# Patient Record
Sex: FEMALE | Race: WHITE | Employment: OTHER | URBAN - METROPOLITAN AREA
[De-identification: names, ages, dates, MRNs, and addresses within clinical notes are randomized per-mention and may not be internally consistent; named-entity substitution may affect disease eponyms.]

---

## 2025-01-07 ENCOUNTER — HOSPITAL ENCOUNTER (INPATIENT)
Age: 70
LOS: 10 days | Discharge: NURSING, REHAB OR LONG-TERM CARE | DRG: 082 | End: 2025-01-17
Attending: EMERGENCY MEDICINE | Admitting: PSYCHIATRY & NEUROLOGY
Payer: MEDICARE

## 2025-01-07 ENCOUNTER — APPOINTMENT (OUTPATIENT)
Dept: GENERAL RADIOLOGY | Age: 70
DRG: 082 | End: 2025-01-07
Payer: MEDICARE

## 2025-01-07 ENCOUNTER — APPOINTMENT (OUTPATIENT)
Dept: MRI IMAGING | Age: 70
DRG: 082 | End: 2025-01-07
Payer: MEDICARE

## 2025-01-07 ENCOUNTER — HOSPITAL ENCOUNTER (EMERGENCY)
Age: 70
Discharge: ANOTHER HOSPITAL | DRG: 082 | End: 2025-01-07
Attending: EMERGENCY MEDICINE
Payer: MEDICARE

## 2025-01-07 ENCOUNTER — APPOINTMENT (OUTPATIENT)
Dept: CT IMAGING | Age: 70
DRG: 082 | End: 2025-01-07
Payer: MEDICARE

## 2025-01-07 VITALS
TEMPERATURE: 98 F | BODY MASS INDEX: 23.54 KG/M2 | HEART RATE: 69 BPM | DIASTOLIC BLOOD PRESSURE: 90 MMHG | OXYGEN SATURATION: 91 % | HEIGHT: 67 IN | SYSTOLIC BLOOD PRESSURE: 133 MMHG | WEIGHT: 150 LBS | RESPIRATION RATE: 16 BRPM

## 2025-01-07 DIAGNOSIS — R79.89 ELEVATED TSH: ICD-10-CM

## 2025-01-07 DIAGNOSIS — S06.33AA: Primary | ICD-10-CM

## 2025-01-07 DIAGNOSIS — R29.6 FREQUENT FALLS: ICD-10-CM

## 2025-01-07 DIAGNOSIS — I63.9 CEREBROVASCULAR ACCIDENT (CVA), UNSPECIFIED MECHANISM (HCC): ICD-10-CM

## 2025-01-07 DIAGNOSIS — M17.0 PRIMARY OSTEOARTHRITIS OF BOTH KNEES: ICD-10-CM

## 2025-01-07 DIAGNOSIS — R79.89 ELEVATED TROPONIN: ICD-10-CM

## 2025-01-07 DIAGNOSIS — S06.33AA FOCAL HEMORRHAGIC CONTUSION OF CEREBRUM: Primary | ICD-10-CM

## 2025-01-07 PROBLEM — I62.9 ACUTE INTRACRANIAL HEMORRHAGE (HCC): Status: ACTIVE | Noted: 2025-01-07

## 2025-01-07 LAB
25(OH)D3 SERPL-MCNC: 32.1 NG/ML (ref 30–100)
ALBUMIN SERPL-MCNC: 4.5 G/DL (ref 3.5–5.2)
ALBUMIN SERPL-MCNC: 4.7 G/DL (ref 3.5–5.2)
ALBUMIN/GLOB SERPL: 1.2 {RATIO} (ref 1–2.5)
ALP SERPL-CCNC: 97 U/L (ref 35–104)
ALT SERPL-CCNC: 17 U/L (ref 5–33)
AMMONIA PLAS-SCNC: 14 UMOL/L (ref 11–51)
AMPHET UR QL SCN: NEGATIVE
ANION GAP SERPL CALCULATED.3IONS-SCNC: 14 MMOL/L (ref 9–17)
APAP SERPL-MCNC: <5 UG/ML (ref 10–30)
AST SERPL-CCNC: 25 U/L
BACTERIA URNS QL MICRO: ABNORMAL
BARBITURATES UR QL SCN: NEGATIVE
BASOPHILS # BLD: 0.1 K/UL (ref 0–0.2)
BASOPHILS NFR BLD: 0 % (ref 0–2)
BENZODIAZ UR QL: NEGATIVE
BILIRUB SERPL-MCNC: 0.4 MG/DL (ref 0.3–1.2)
BILIRUB UR QL STRIP: NEGATIVE
BUN SERPL-MCNC: 16 MG/DL (ref 8–23)
CALCIUM SERPL-MCNC: 9.6 MG/DL (ref 8.6–10.4)
CANNABINOIDS UR QL SCN: NEGATIVE
CHARACTER UR: ABNORMAL
CHLORIDE SERPL-SCNC: 101 MMOL/L (ref 98–107)
CLARITY UR: CLEAR
CO2 SERPL-SCNC: 27 MMOL/L (ref 20–31)
COCAINE UR QL SCN: NEGATIVE
COLOR UR: YELLOW
CREAT SERPL-MCNC: 0.8 MG/DL (ref 0.5–0.9)
EOSINOPHIL # BLD: 0 K/UL (ref 0–0.4)
EOSINOPHILS RELATIVE PERCENT: 0 % (ref 1–4)
EPI CELLS #/AREA URNS HPF: ABNORMAL /HPF (ref 0–5)
ERYTHROCYTE [DISTWIDTH] IN BLOOD BY AUTOMATED COUNT: 16.5 % (ref 12.5–15.4)
EST. AVERAGE GLUCOSE BLD GHB EST-MCNC: 126 MG/DL
ETHANOL PERCENT: <0.01 %
ETHANOLAMINE SERPL-MCNC: <10 MG/DL (ref 0–0.08)
FENTANYL UR QL: NEGATIVE
GFR, ESTIMATED: 80 ML/MIN/1.73M2
GLUCOSE SERPL-MCNC: 166 MG/DL (ref 70–99)
GLUCOSE UR STRIP-MCNC: NEGATIVE MG/DL
HBA1C MFR BLD: 6 % (ref 4–6)
HCT VFR BLD AUTO: 43.6 % (ref 36–46)
HGB BLD-MCNC: 14.3 G/DL (ref 12–16)
HGB UR QL STRIP.AUTO: NEGATIVE
INR PPP: 1
KETONES UR STRIP-MCNC: NEGATIVE MG/DL
LEUKOCYTE ESTERASE UR QL STRIP: NEGATIVE
LYMPHOCYTES NFR BLD: 1.3 K/UL (ref 1–4.8)
LYMPHOCYTES RELATIVE PERCENT: 9 % (ref 24–44)
MCH RBC QN AUTO: 30.3 PG (ref 26–34)
MCHC RBC AUTO-ENTMCNC: 32.9 G/DL (ref 31–37)
MCV RBC AUTO: 92 FL (ref 80–100)
METHADONE UR QL: NEGATIVE
MONOCYTES NFR BLD: 0.9 K/UL (ref 0.1–1.2)
MONOCYTES NFR BLD: 6 % (ref 2–11)
NEUTROPHILS NFR BLD: 85 % (ref 36–66)
NEUTS SEG NFR BLD: 12 K/UL (ref 1.8–7.7)
NITRITE UR QL STRIP: NEGATIVE
OPIATES UR QL SCN: NEGATIVE
OXYCODONE UR QL SCN: NEGATIVE
PARTIAL THROMBOPLASTIN TIME: 23.2 SEC (ref 21.3–31.3)
PCP UR QL SCN: NEGATIVE
PH UR STRIP: 6 [PH] (ref 5–8)
PLATELET # BLD AUTO: 356 K/UL (ref 140–450)
PMV BLD AUTO: 7.4 FL (ref 6–12)
POTASSIUM SERPL-SCNC: 4.2 MMOL/L (ref 3.7–5.3)
PROT SERPL-MCNC: 8.6 G/DL (ref 6.4–8.3)
PROT UR STRIP-MCNC: ABNORMAL MG/DL
PROTHROMBIN TIME: 10.3 SEC (ref 9.4–12.6)
RBC # BLD AUTO: 4.74 M/UL (ref 4–5.2)
RBC #/AREA URNS HPF: ABNORMAL /HPF (ref 0–2)
SALICYLATES SERPL-MCNC: <1 MG/DL (ref 3–10)
SODIUM SERPL-SCNC: 142 MMOL/L (ref 135–144)
SP GR UR STRIP: 1.02 (ref 1–1.03)
T4 FREE SERPL-MCNC: 0.1 NG/DL (ref 0.92–1.68)
T4 FREE SERPL-MCNC: <0.1 NG/DL (ref 0.9–1.7)
TEST INFORMATION: NORMAL
TROPONIN I SERPL HS-MCNC: 45 NG/L (ref 0–14)
TROPONIN I SERPL HS-MCNC: 55 NG/L (ref 0–14)
TROPONIN I SERPL HS-MCNC: 60 NG/L (ref 0–14)
TSH SERPL DL<=0.05 MIU/L-ACNC: 37 UIU/ML (ref 0.27–4.2)
TSH SERPL DL<=0.05 MIU/L-ACNC: 45.82 UIU/ML (ref 0.3–5)
UROBILINOGEN UR STRIP-ACNC: NORMAL EU/DL (ref 0–1)
VIT B12 SERPL-MCNC: 1044 PG/ML (ref 232–1245)
WBC #/AREA URNS HPF: ABNORMAL /HPF (ref 0–5)
WBC OTHER # BLD: 14.2 K/UL (ref 3.5–11)

## 2025-01-07 PROCEDURE — 93005 ELECTROCARDIOGRAM TRACING: CPT | Performed by: EMERGENCY MEDICINE

## 2025-01-07 PROCEDURE — 6360000002 HC RX W HCPCS: Performed by: EMERGENCY MEDICINE

## 2025-01-07 PROCEDURE — 84439 ASSAY OF FREE THYROXINE: CPT

## 2025-01-07 PROCEDURE — 90715 TDAP VACCINE 7 YRS/> IM: CPT | Performed by: EMERGENCY MEDICINE

## 2025-01-07 PROCEDURE — 81001 URINALYSIS AUTO W/SCOPE: CPT

## 2025-01-07 PROCEDURE — 70450 CT HEAD/BRAIN W/O DYE: CPT

## 2025-01-07 PROCEDURE — 70553 MRI BRAIN STEM W/O & W/DYE: CPT

## 2025-01-07 PROCEDURE — 87086 URINE CULTURE/COLONY COUNT: CPT

## 2025-01-07 PROCEDURE — 90471 IMMUNIZATION ADMIN: CPT | Performed by: EMERGENCY MEDICINE

## 2025-01-07 PROCEDURE — 72125 CT NECK SPINE W/O DYE: CPT

## 2025-01-07 PROCEDURE — 73562 X-RAY EXAM OF KNEE 3: CPT

## 2025-01-07 PROCEDURE — G0480 DRUG TEST DEF 1-7 CLASSES: HCPCS

## 2025-01-07 PROCEDURE — 83036 HEMOGLOBIN GLYCOSYLATED A1C: CPT

## 2025-01-07 PROCEDURE — 80179 DRUG ASSAY SALICYLATE: CPT

## 2025-01-07 PROCEDURE — 85610 PROTHROMBIN TIME: CPT

## 2025-01-07 PROCEDURE — 6360000002 HC RX W HCPCS

## 2025-01-07 PROCEDURE — 82140 ASSAY OF AMMONIA: CPT

## 2025-01-07 PROCEDURE — 82040 ASSAY OF SERUM ALBUMIN: CPT

## 2025-01-07 PROCEDURE — 99222 1ST HOSP IP/OBS MODERATE 55: CPT | Performed by: SURGERY

## 2025-01-07 PROCEDURE — 99285 EMERGENCY DEPT VISIT HI MDM: CPT

## 2025-01-07 PROCEDURE — 6360000004 HC RX CONTRAST MEDICATION: Performed by: STUDENT IN AN ORGANIZED HEALTH CARE EDUCATION/TRAINING PROGRAM

## 2025-01-07 PROCEDURE — 84443 ASSAY THYROID STIM HORMONE: CPT

## 2025-01-07 PROCEDURE — A9579 GAD-BASE MR CONTRAST NOS,1ML: HCPCS | Performed by: STUDENT IN AN ORGANIZED HEALTH CARE EDUCATION/TRAINING PROGRAM

## 2025-01-07 PROCEDURE — 6370000000 HC RX 637 (ALT 250 FOR IP)

## 2025-01-07 PROCEDURE — 84484 ASSAY OF TROPONIN QUANT: CPT

## 2025-01-07 PROCEDURE — 85730 THROMBOPLASTIN TIME PARTIAL: CPT

## 2025-01-07 PROCEDURE — 80053 COMPREHEN METABOLIC PANEL: CPT

## 2025-01-07 PROCEDURE — 82607 VITAMIN B-12: CPT

## 2025-01-07 PROCEDURE — 80307 DRUG TEST PRSMV CHEM ANLYZR: CPT

## 2025-01-07 PROCEDURE — 2000000000 HC ICU R&B

## 2025-01-07 PROCEDURE — 72170 X-RAY EXAM OF PELVIS: CPT

## 2025-01-07 PROCEDURE — 96365 THER/PROPH/DIAG IV INF INIT: CPT

## 2025-01-07 PROCEDURE — 96366 THER/PROPH/DIAG IV INF ADDON: CPT

## 2025-01-07 PROCEDURE — 85025 COMPLETE CBC W/AUTO DIFF WBC: CPT

## 2025-01-07 PROCEDURE — 82306 VITAMIN D 25 HYDROXY: CPT

## 2025-01-07 PROCEDURE — 80143 DRUG ASSAY ACETAMINOPHEN: CPT

## 2025-01-07 PROCEDURE — 71045 X-RAY EXAM CHEST 1 VIEW: CPT

## 2025-01-07 RX ORDER — ONDANSETRON 4 MG/1
4 TABLET, ORALLY DISINTEGRATING ORAL EVERY 8 HOURS PRN
Status: DISCONTINUED | OUTPATIENT
Start: 2025-01-07 | End: 2025-01-13 | Stop reason: SDUPTHER

## 2025-01-07 RX ORDER — ACETAMINOPHEN 325 MG/1
650 TABLET ORAL EVERY 6 HOURS PRN
Status: DISCONTINUED | OUTPATIENT
Start: 2025-01-07 | End: 2025-01-16

## 2025-01-07 RX ORDER — LEVOTHYROXINE SODIUM 50 UG/1
25 TABLET ORAL DAILY
Status: DISCONTINUED | OUTPATIENT
Start: 2025-01-07 | End: 2025-01-08

## 2025-01-07 RX ORDER — WATER 10 ML/10ML
INJECTION INTRAMUSCULAR; INTRAVENOUS; SUBCUTANEOUS
Status: DISPENSED
Start: 2025-01-07 | End: 2025-01-08

## 2025-01-07 RX ORDER — ACETAMINOPHEN 500 MG
1000 TABLET ORAL ONCE
Status: COMPLETED | OUTPATIENT
Start: 2025-01-07 | End: 2025-01-07

## 2025-01-07 RX ORDER — ACETAMINOPHEN 650 MG/1
650 SUPPOSITORY RECTAL EVERY 6 HOURS PRN
Status: DISCONTINUED | OUTPATIENT
Start: 2025-01-07 | End: 2025-01-16

## 2025-01-07 RX ORDER — LABETALOL HYDROCHLORIDE 5 MG/ML
10 INJECTION, SOLUTION INTRAVENOUS ONCE
Status: COMPLETED | OUTPATIENT
Start: 2025-01-07 | End: 2025-01-07

## 2025-01-07 RX ORDER — SODIUM CHLORIDE 0.9 % (FLUSH) 0.9 %
5-40 SYRINGE (ML) INJECTION PRN
Status: DISCONTINUED | OUTPATIENT
Start: 2025-01-07 | End: 2025-01-17 | Stop reason: HOSPADM

## 2025-01-07 RX ORDER — ONDANSETRON 2 MG/ML
4 INJECTION INTRAMUSCULAR; INTRAVENOUS EVERY 6 HOURS PRN
Status: DISCONTINUED | OUTPATIENT
Start: 2025-01-07 | End: 2025-01-13 | Stop reason: SDUPTHER

## 2025-01-07 RX ORDER — HYDRALAZINE HYDROCHLORIDE 20 MG/ML
10 INJECTION INTRAMUSCULAR; INTRAVENOUS
Status: DISCONTINUED | OUTPATIENT
Start: 2025-01-07 | End: 2025-01-12

## 2025-01-07 RX ORDER — SODIUM CHLORIDE 9 MG/ML
INJECTION, SOLUTION INTRAVENOUS PRN
Status: DISCONTINUED | OUTPATIENT
Start: 2025-01-07 | End: 2025-01-17 | Stop reason: HOSPADM

## 2025-01-07 RX ORDER — SODIUM CHLORIDE 0.9 % (FLUSH) 0.9 %
5-40 SYRINGE (ML) INJECTION EVERY 12 HOURS SCHEDULED
Status: DISCONTINUED | OUTPATIENT
Start: 2025-01-07 | End: 2025-01-17 | Stop reason: HOSPADM

## 2025-01-07 RX ORDER — NICARDIPINE HYDROCHLORIDE 0.1 MG/ML
2.5-15 INJECTION INTRAVENOUS CONTINUOUS
Status: DISCONTINUED | OUTPATIENT
Start: 2025-01-07 | End: 2025-01-08

## 2025-01-07 RX ORDER — LABETALOL HYDROCHLORIDE 5 MG/ML
10 INJECTION, SOLUTION INTRAVENOUS
Status: DISCONTINUED | OUTPATIENT
Start: 2025-01-07 | End: 2025-01-12

## 2025-01-07 RX ADMIN — NICARDIPINE HYDROCHLORIDE 2.5 MG/HR: 0.1 INJECTION INTRAVENOUS at 17:18

## 2025-01-07 RX ADMIN — ACETAMINOPHEN 1000 MG: 500 TABLET ORAL at 11:02

## 2025-01-07 RX ADMIN — LABETALOL HYDROCHLORIDE 10 MG: 5 INJECTION, SOLUTION INTRAVENOUS at 06:53

## 2025-01-07 RX ADMIN — NICARDIPINE HYDROCHLORIDE 2.5 MG/HR: 0.1 INJECTION INTRAVENOUS at 10:19

## 2025-01-07 RX ADMIN — LEVOTHYROXINE SODIUM 25 MCG: 50 TABLET ORAL at 18:47

## 2025-01-07 RX ADMIN — TETANUS TOXOID, REDUCED DIPHTHERIA TOXOID AND ACELLULAR PERTUSSIS VACCINE, ADSORBED 0.5 ML: 5; 2.5; 8; 8; 2.5 SUSPENSION INTRAMUSCULAR at 06:15

## 2025-01-07 RX ADMIN — GADOTERIDOL 13 ML: 279.3 INJECTION, SOLUTION INTRAVENOUS at 13:17

## 2025-01-07 ASSESSMENT — LIFESTYLE VARIABLES
HOW OFTEN DO YOU HAVE A DRINK CONTAINING ALCOHOL: NEVER
HOW MANY STANDARD DRINKS CONTAINING ALCOHOL DO YOU HAVE ON A TYPICAL DAY: PATIENT DOES NOT DRINK

## 2025-01-07 ASSESSMENT — PAIN DESCRIPTION - LOCATION
LOCATION: KNEE

## 2025-01-07 ASSESSMENT — PAIN - FUNCTIONAL ASSESSMENT
PAIN_FUNCTIONAL_ASSESSMENT: PREVENTS OR INTERFERES SOME ACTIVE ACTIVITIES AND ADLS
PAIN_FUNCTIONAL_ASSESSMENT: NONE - DENIES PAIN
PAIN_FUNCTIONAL_ASSESSMENT: 0-10
PAIN_FUNCTIONAL_ASSESSMENT: PREVENTS OR INTERFERES SOME ACTIVE ACTIVITIES AND ADLS

## 2025-01-07 ASSESSMENT — PAIN SCALES - GENERAL
PAINLEVEL_OUTOF10: 7
PAINLEVEL_OUTOF10: 0
PAINLEVEL_OUTOF10: 0
PAINLEVEL_OUTOF10: 9

## 2025-01-07 ASSESSMENT — PAIN DESCRIPTION - DESCRIPTORS
DESCRIPTORS: SORE
DESCRIPTORS: SHARP;SQUEEZING

## 2025-01-07 ASSESSMENT — PAIN DESCRIPTION - ORIENTATION
ORIENTATION: LEFT;RIGHT
ORIENTATION: RIGHT;LEFT
ORIENTATION: RIGHT;LEFT

## 2025-01-07 ASSESSMENT — PAIN DESCRIPTION - FREQUENCY: FREQUENCY: CONTINUOUS

## 2025-01-07 ASSESSMENT — PAIN DESCRIPTION - PAIN TYPE
TYPE: ACUTE PAIN
TYPE: ACUTE PAIN

## 2025-01-07 NOTE — PLAN OF CARE
--  NO ACUTE NEUROSURGICAL INTERVENTION AT THIS TIME    NEUROSURGERY TO SIGN OFF     Please contact Neurosurgery with any questions.    PATIENT TO FOLLOW UP IN CLINIC:  ---  Follow-up with Neurosurgery  [address] [facility] [address]  [place] [address]  [phone]    Electronically signed by MARLENE Hull CNP on 1/7/2025 at 5:53 PM

## 2025-01-07 NOTE — ED PROVIDER NOTES
[facility] Emergency Department  [address]  [facility] [address]  Phone: [phone]  Fax: [phone]      Pt Name: Lanny Jaimes  MRN:[id number]  Birthdate 1955  Date of evaluation: 1/7/2025      CHIEF COMPLAINT       Chief Complaint   Patient presents with    Fall     Multiple falls over past couple months    Altered Mental Status       HISTORY OF PRESENT ILLNESS   Lanny Jaimes is a 69 y.o. female who presents for evaluation of altered mental status and frequent falls.  The patient is a poor historian and lives alone.  Her neighbor checks on her frequently and states that she has known falls in September and November.  The patient has not followed with a doctor in several years and was previously treated for hypothyroidism.  The patient does not take any medications on a regular basis.  She admits to falling earlier tonight onto her knees.  She denies striking her head or loss of consciousness.  She complains of some abrasions to her knees and is unsure of the date of her last tetanus shot.  She currently denies any pain.  EMS was called because the patient had difficulty getting up.  They did not feel comfortable leaving her alone since she seemed somewhat altered.  The patient was noted to be confabulating but eventually answers questions appropriately.  She denies fever, chills, headache, vision changes, neck pain, back pain, chest pain, shortness of breath, abdominal pain, urinary/bowel symptoms, focal weakness, numbness, tingling, dizziness, lightheadedness, syncope, recent illness    REVIEW OF SYSTEMS     Positive: Frequent falls, altered mental status  Ten point review of systems was reviewed and is negative unless otherwise noted in the HPI    PAST MEDICAL HISTORY    has a past medical history of Thyroid disease.    SURGICAL HISTORY      has no past surgical history on file.  Patient denies    CURRENT MEDICATIONS       Previous Medications    No medications on file       ALLERGIES    myxedema. Troponin elevated at 60 and 55 but I do not have any baseline for comparison. EKG shows sinus rhythm without any ST changes.  CT head shows a possible small hemorrhagic contusion at the lateral aspect of the left parietal lobe.  There are chronic microvascular ischemic changes.  X-ray of the bilateral knees shows tricompartmental osteoarthritis of the knees.  No acute fracture or dislocation.  She has abrasions over the knees that do not appear infected.  No foreign body.  Her tetanus shot was updated.  Chest x-ray shows no acute process.  I contacted neurosurgery, Dr. Ascencio, at 5:32 AM regarding the patient's CT findings.  He recommends transfer to [place] [facility].  I placed a order for transfer and was initially connected with Dr. Saldana from neurocritical care at 6:08 AM.  He states that he is happy to admit the patient but there is concern that the patient should be evaluated by trauma first.  I subsequently spoke with the ER attending at [facility], Dr. Yang, at 6:16 AM who agrees to accept the patient as an ER to ER transfer with plan to have trauma evaluate. I spoke to the trauma surgery attending, Dr. Russell, at 6:37am and he would like me to add on a CT cervical spine. This will be completed prior to transfer. The patient will be transferred via ground unit from ER to ER.    Differential diagnosis considered: Ingestion, infectious, trauma, seizure, AMS, electrolytes, encephalopathy, insulin, opiates, uremia, toxins, tumor, thyrotoxicosis, psychiatric, sepsis, stroke, N/V, seizure, headache, elderly age, alcohol / drugs / toxidromes, signs of trauma    Chronic Conditions affecting care (DM,HTN,CA, etc): hypothyroidism    Social Determinants of Health affecting care (unable to care for self, lives alone, unemployed, homeless,etc): lives alone    History source(s) (patient,spouse,parent,family,friend,EMS,etc): neighbor    Review of external sources (ECF,Hospital records,EMS report, radiology reports,  etc): hospital records, radiology reports, EMS    Tests considered but not ordered: Not applicable    Independent interpretation of tests (eg.  X-ray, CAT scan, Doppler studies, EKG): EKG    Discussion of x-ray results with radiology: yes, CT head shows possible small left parietal contusion    Consults: Neurosurgery, Trauma surgery, neuro critical care, ER attending, radiology    Consideration for admission/observation (even if discharged): Transfer to [facility] considerations: n/a    Sepsis considered: low suspicion    Critical Care note written: yes        FINAL IMPRESSION      1. Focal contusion of parietal lobe    2. Elevated TSH    3. Primary osteoarthritis of both knees    4. Frequent falls    5. Elevated troponin          DISPOSITION/PLAN   DISPOSITION Decision To Transfer 01/07/2025 05:37:07 AM              (Please note that portions of this note were completed with a voice recognitionprogram.  Efforts were made to edit the dictations but occasionally words are mis-transcribed.)    Traci Aaron DO, FACEP  Emergency Physician Attending                 Traci Aaron DO  01/07/25 0641       Traci Aaron DO  01/07/25 0706

## 2025-01-07 NOTE — ED PROVIDER NOTES
[facility]     Emergency Department     Faculty Note/ Attestation      9:31 AM EST  Pt Name: Lanny Jaimes                                       MRN: [id number]  Birthdate 1955  Date of evaluation: 1/7/2025  Patients PCP:    No primary care provider on file.    Attestation  I performed a history and physical examination of the patient/ or directly observed  and discussed management with the resident. I reviewed the resident’s note and agree with the documented findings and plan of care. Any areas of disagreement are noted on the chart. I was personally present for the key portions of any procedures. I have documented in the chart those procedures where I was not present during the key portions. I have reviewed the emergency nurses triage note. I agree with the chief complaint, past medical history, past surgical history, allergies, medications, social and family history as documented unless otherwise noted below.    For Physician Assistant/ Nurse Practitioner cases/documentation I have personally evaluated this patient and have completed at least one if not all key elements of the E/M (history, physical exam, and MDM). Additional findings are as noted.       Initial Screens:     -------------------------------------     ----------------------------------------  [place] Coma Scale  Eye Opening: Spontaneous  Best Verbal Response: Oriented  Best Motor Response: Obeys commands  Iva Coma Scale Score: 15  ------------------------------------------------------------------------------------------------------------  Vitals:    Vitals:    01/07/25 0924   Pulse: 78   Resp: 17   Temp: 99.5 °F (37.5 °C)   TempSrc: Oral   SpO2: 97%   Weight: 68 kg (149 lb 14.6 oz)   Height: 1.702 m (5' 7\")       Chief Complaint      Chief Complaint   Patient presents with    Fall    Altered Mental Status          height is 1.702 m (5' 7\") and weight is 68 kg (149 lb 14.6 oz). Her oral temperature is 99.5 °F (37.5

## 2025-01-07 NOTE — ED NOTES
ED to inpatient nurses report      Chief Complaint:  Chief Complaint   Patient presents with    Fall    Altered Mental Status     Present to ED from: from home to [facility] then transferred to [facility]     MOA:     LOC: alert and orientated to name, place, date  Mobility: Requires assistance * 1 walker at home   Oxygen Baseline: room aor     Current needs required: frequent falls    Pending ED orders: none   Present condition: fair     Why did the patient come to the ED? as a transfer from [place].  Patient reports sustaining a fall at approximately 1 AM.  She was evaluated at [facility] where CT of the head demonstrated a left hemorrhagic contusion of the parietal lobe.  Patient denies any chemical anticoagulation history.  Denies any prior history of stroke.  She is resting comfortably in bed.  She denies any acute pain at this time. Denies any dysthesia or numbness to limbs. Able to actively move all extremities.      What is the plan? Admit his is a 69 y.o. female with a left parietal lobe hemorrhagic contusion                - No neurosurgical interventions planned for now  - HOB: 30 degrees              - Neuro checks per protocol  - Hold all antiplatelets and anticoagulants  - We recommend SBP < 140              - Determine the lower limit of SBP   Any procedures or intervention occur? Cardene gtt titration . MRI--completed  , labs,   Any safety concerns?? Yes, frequent falls risk ,     Mental Status:  Level of Consciousness: New confusion or agitation (1)    Psych Assessment:   Psychosocial  Psychosocial (WDL): Within Defined Limits  Vital signs   Vitals:    01/07/25 1040 01/07/25 1045 01/07/25 1100 01/07/25 1102   BP:  (!) 159/80 (!) 161/68 (!) 161/68   Pulse: 79 79 79 85   Resp: 13 17 17 19   Temp:       TempSrc:       SpO2: 96% 97% 93% 94%   Weight:       Height:            Vitals:  Patient Vitals for the past 24 hrs:   BP Temp Temp src Pulse Resp SpO2 Height Weight   01/07/25 1102  interventions, trauma deferring admission to medicine or neuro.  Will reach out to neurocritical care as they did accept patient from [facility]. [BG]   1449 Neuro critical care accepting patient for admission [BG]      ED Course User Index  [BG] Yousuf Mack MD          Skin Assessment:        Pain Score:  Pain Assessment  Pain Assessment: 0-10  Pain Level: 7  Patient's Stated Pain Goal: 0 - No pain  Pain Location: Knee  Pain Orientation: Right, Left  Pain Descriptors: Sharp, Squeezing  Functional Pain Assessment: Prevents or interferes some active activities and ADLs  Pain Type: Acute pain  Pain Frequency: Continuous  Multiple Pain Sites: No      SOCIAL HISTORY       Social History     Socioeconomic History    Marital status: Single   Tobacco Use    Smoking status: Never   Substance and Sexual Activity    Alcohol use: Not Currently    Drug use: Never       FAMILY HISTORY     No family history on file.    ALLERGIES     Pcn [penicillins]    CURRENT MEDICATIONS       Previous Medications    No medications on file     Orders Placed This Encounter   Medications    niCARdipine (CARDENE) 20 mg in 0.9 % sodium chloride 200 mL solution     Order Specific Question:   Titrate Infusion?     Answer:   Yes     Order Specific Question:   Initial Infusion Rate:     Answer:   Other     Order Specific Question:   Other (mg/hr):     Answer:   2.5 mg/hr     Order Specific Question:   Goal of Therapy is:     Answer:   SBP less than 160 mmHg     Order Specific Question:   Contact Provider if:     Answer:   Patient is receiving the maximum dose and is not achieving the goal of therapy    acetaminophen (TYLENOL) tablet 1,000 mg       SURGICAL HISTORY     No past surgical history on file.    PAST MEDICAL HISTORY       Past Medical History:   Diagnosis Date    Thyroid disease        Labs:  Labs Reviewed   ALBUMIN   HEMOGLOBIN A1C   T4, FREE   TSH   VITAMIN D 25 HYDROXY   VITAMIN B12   TROPONIN       Electronically signed by PABLO TOBAR

## 2025-01-07 NOTE — ED PROVIDER NOTES
[facility] NEURO ICU  Emergency Department Encounter  Emergency Medicine Resident     Pt Name:Lanny Jaimes  MRN: [id number]  Birthdate 1955  Date of evaluation: 1/7/25  PCP:  No primary care provider on file.  Note Started: 9:22 AM EST      CHIEF COMPLAINT       Chief Complaint   Patient presents with    Fall     Frequent falls     Altered Mental Status       HISTORY OF PRESENT ILLNESS  (Location/Symptom, Timing/Onset, Context/Setting, Quality, Duration, Modifying Factors, Severity.)      Lanny Jaimes is a 69 y.o. female with a history of hypothyroidism who presents as a transfer from OSH for altered mental status and frequent falls, concern for possible small hemorrhagic contusion at the lateral aspect of the left parietal lobe.  She was transferred for evaluation by trauma.  Patient states she had a mechanical fall last night, subsequently landing on bilateral knees.  She was unable to get up, found by her neighbors who requested EMS.  Patient denies hitting her head, is not on anticoagulation.  She remembers all event surrounding situation.  She was evaluated at OSH, imaging of knee is unremarkable, concern for small hemorrhagic contusion.  There are no reported vision changes, episodes of nausea or vomiting.  She denies head, neck or back pain.  Patient states she has been out of her medication for approximately 1 month.    PAST MEDICAL / SURGICAL / SOCIAL / FAMILY HISTORY      has a past medical history of Thyroid disease.       has no past surgical history on file.      Social History     Socioeconomic History    Marital status: Single     Spouse name: Not on file    Number of children: Not on file    Years of education: Not on file    Highest education level: Not on file   Occupational History    Not on file   Tobacco Use    Smoking status: Never    Smokeless tobacco: Not on file   Substance and Sexual Activity    Alcohol use: Not Currently    Drug use: Never    Sexual activity: Not on file   Other Topics  neurocritical care unit pending evaluation by neurosurgery and trauma.     Amount and/or Complexity of Data Reviewed  Labs: ordered. Decision-making details documented in ED Course.    Risk  OTC drugs.  Prescription drug management.  Decision regarding hospitalization.        EMERGENCY DEPARTMENT COURSE:  ED Course as of 01/07/25 1914 Tue Jan 07, 2025   1406 TSH, 3rd Generation(!): 37.00 [BG]   1406 T4 Free(!): 0.1 [BG]   1406 Patient has not been taking her prescribed hypothyroidism medication for over a month. [BG]   1406 Neurosurgery with no acute interventions, trauma deferring admission to medicine or neuro.  Will reach out to neurocritical care as they did accept patient from [facility]. [BG]   1449 Neuro critical care accepting patient for admission [BG]      ED Course User Index  [BG] Yousuf Mack MD       PROCEDURES:  None    CONSULTS:  IP CONSULT TO TRAUMA SURGERY  IP CONSULT TO NEUROSURGERY  IP CONSULT TO NEUROCRITICAL CARE  IP CONSULT TO NEUROSURGERY  IP CONSULT TO STROKE TEAM      FINAL IMPRESSION      1. Focal hemorrhagic contusion of cerebrum          DISPOSITION / PLAN     DISPOSITION Admitted 01/07/2025 03:19:34 PM         PATIENT REFERRED TO:  Pedro Ascencio MD  [address] [address]  [phone]    Follow up in 6 week(s)        DISCHARGE MEDICATIONS:  There are no discharge medications for this patient.      Yousuf Mack MD  Emergency Medicine Resident    (Please note that portions of thisnote were completed with a voice recognition program.  Efforts were made to edit the dictations but occasionally words are mis-transcribed.)

## 2025-01-07 NOTE — PROGRESS NOTES
[facility] HEALTH  [facility]  Emergency/Trauma Note    PATIENT NAME: Lanny Jaimes     Shift date: 1.7.2025  Shift day: Tuesday   Shift # 1    Room # 02/02   Name: Lanny Jaimes            Age: 69 y.o.  Gender: female          Language: English   Denominational: Voodoo   Principal Problem: <principal problem not specified>     Trauma/Incident type: Adult Trauma Consult    Admit Date & Time: 1/7/2025  9:21 AM  TRAUMA NAME: None    ADVANCE DIRECTIVES IN CHART?  No    NAME OF DECISION MAKER: None    RELATIONSHIP OF DECISION MAKER TO PATIENT: None     EMERGENCY CONTACTS IN CHART:  Extended Emergency Contact Information  Primary Emergency Contact: Lavinia Blanc  Mobile Phone: [phone]  Relation: Friend  Preferred language: English   needed? No  Secondary Emergency Contact: kim Friedman  Mobile Phone: [phone]  Relation: Child   needed? No       PATIENT/EVENT DESCRIPTION:  Lanny Jaimes is a 69 y.o. female who arrived via EMT  from Scene of the Accident  as a Adult Trauma Consult  due to Fall.      Pt to be admitted to 02/02.         SPIRITUAL ASSESSMENT:  Patient Assessment: Available for assessment  Patient appears to show no distress at this time, appearing Calm and Coping.  States that she is in contact with family and has a cell phone at bedside.  No support currently present.          Relational Resources appear average.      Emotional Resources appear average.      Spiritual Resources appear unknown at this time.        Perceived Needs Assessment:  Receive care and concern and Establish rapport and connectedness.      INTERVENTION:   Actively listened.      OUTCOME:  Demonstrated caring concern and Demonstrated kindness and compassion with a congruent response given the current situation and/or circumstances.        PATIENT BELONGINGS:  No belongings noted    ANY BELONGINGS OF SIGNIFICANT VALUE NOTED:  None    REGISTRATION STAFF NOTIFIED?    Yes    WHAT IS YOUR

## 2025-01-07 NOTE — ED NOTES
Pt resting in bed, NAD noted rr even and non labored.  Bed locked in lowest position, environment free of clutter.  Call light within reach, will continue to monitor.

## 2025-01-07 NOTE — CARE COORDINATION
Trauma Coordinator Rounding Note  Daily check in:  I met with Lanny Carpiober  at bedside to review their plan of care. Pt remained still in the ER RM 27 on my visit. I allowed opportunity for Lanny Theodore to ask questions regarding their injuries, expected length of stay and disposition plan. All questions answered to verbal satisfaction.   []Wounds  []PT/OT/speech  []Incentive spirometer  []Diet  []Activity  [x]Preferred pharmacy  [x] PCP Confirmed (New PCP Jan 21st)  [x] Insurance Confirmed    DC Expectation:  Patient expects to be discharged to  [facility] at this time  Bedside Nurse:  I spoke with the patient's assigned nurse to review the plan of care for today and provide an opportunity to ask questions or relay any concerns to the Trauma service.    Discharge Info:  I confirmed follow up information was placed in the discharge instructions for  Unknown at this time .   Electronically signed by Toyin Aden RN on 1/7/2025 at 3:31 PM

## 2025-01-07 NOTE — H&P
Neuro ICU History & Physical    Patient Name: Lanny Jaimes  Patient : 1955  Room/Bed:   Code Status: Full  Allergies:   Allergies   Allergen Reactions    Pcn [Penicillins] Anaphylaxis       CHIEF COMPLAINT     Chief Complaint   Patient presents with    Fall     Frequent falls     Altered Mental Status       HPI    History Obtained From: Patient, electronic medical record    The patient is a 69 y.o. female with a history of hypertension hypothyroidism who presented as a transfer from an [facility] where she was seen for altered mental status following a fall at home.  Per patient she was attempting to get out of her bed when she fell from standing height landing on her knees and falling to her backside, denies hitting her head and denies loss of consciousness.  Following the fall patient states that she was unable to get up and was found by her neighbors who called the EMS.  At the [facility] patient was noted to be slightly confused, CT head shows a possible small left parietal hemorrhagic contusion and patient was then transferred to [facility] for trauma evaluation which revealed no acute traumatic injuries and neurocritical care was consulted for evaluation and admission to the neuro ICU.  On my initial examination patient is alert and oriented x 3 with delayed and intermittently inappropriate responses with no other focal neurological deficits.  Patient's son at bedside endorses a history of high blood pressure as well as thyroid issues for which she underwent thyroid radiation prior to , however states that patient has not seen a provider in multiple years and no longer takes any antihypertensives or thyroid medications.  At the [facility] patient was noted to be significantly hypertensive  and at the time of evaluation is requiring nicardipine infusion to maintain systolic blood pressure less than 160.  Patient's son additionally endorses that         Labs and Images reviewed with:    [] Esteban Dee MD  [x] Woody Saldana MD  [] Ayush Galeano MD  [] there are no new interval images to review.     ASSESSMENT AND PLAN:         ASSESSMENT:     This is a 69 y.o. female with a history of hypertension hypothyroidism who [facility] for altered mental status following a fall at home. Found to have a small left parietal ICH with no other traumatic injuries and was admitted to the neuro icu for further management.     Patient care will be discussed with attending, will reevaluate patient along with attending.     PLAN/MEDICAL DECISION MAKING:      NEUROLOGIC:  Small left parietal IPH, ?  Traumatic hemorrhagic contusion versus underlying CAA versus hypertensive  - MRI brain showing areas of microhemorrhage consistent with possible underlying CAA  - Repeat CT head in a.m.  - Recommend outpatient neurology follow-up for progressive cognitive decline and memory loss  - Goal SBP < 160  - Neuro checks per protocol    CARDIOVASCULAR:  Essential hypertension  - Continue nicardipine infusion, wean as tolerated  - As needed labetalol and hydralazine  - Reportedly noncompliant with antihypertensive regimen for greater than 1 year  - Goal SBP < 160  - Continue telemetry    PULMONARY:  -Oxygenating well on room air    RENAL/FLUID/ELECTROLYTE:  - BUN 16/ Creatinine 0.8  - Monitor intake and output  - Tolerating oral intake, monitor off IVF  - Replace electrolytes PRN  - Daily BMP    GI/NUTRITION:  NUTRITION:  No diet orders on file  - Bowel regimen: GlycoLax as needed    ID:  - Afebrile  - Leukocytosis, WBC 14.2,?  Reactive  - Continue to monitor for fevers  - Urine culture pending  - Daily CBC    HEME:   - H&H 14.3/43.6  - Platelets 356  - Daily CBC    ENDOCRINE:  Primary Hypothyroidism  - Patient reports thyroid radiation prior to the year 2000 however reports not taking thyroid replacement therapy for greater than 2

## 2025-01-07 NOTE — H&P
TRAUMA H&P/CONSULT    PATIENT NAME: Lanny Jaimes  YOB: 1955  MEDICAL RECORD NO. [id number]   DATE: 1/7/2025  PRIMARY CARE PHYSICIAN: No primary care provider on file.  PATIENT EVALUATED AT THE REQUEST OF DRKim: Dr. King NICHOLS   Trauma Consult-Time at bedside 9:45 am      IMPRESSION AND PLAN:       Diagnosis: FFSH, tx from [place] - possible small hemorrhagic contusion at the lateral aspect of the left parietal lobe    Plan: Pelvic x-ray, NS consult, waiting for NS recs, if NTD, will TERT and sign off. I tried to get frailty score but the pt is not cooperative.         If intracranial hemorrhage is present, is it a:  [] BIG 1  [] BIG 2  [] BIG 3  If chest wall injury: Rib score___    CONSULT SERVICES    NS      HISTORY:     Chief Complaint:  \"Fall\"    GENERAL DATA  Patient information was obtained from patient.  History/Exam limitations: mental status.  Injury Date: 1/7/2025   Approximate Injury Time: 9 am        Transport mode: Ambulance  Referring Hospital: OSH     SETTING OF TRAUMATIC EVENT   Location : Home  Specific Details of Location: Bedroom    MECHANISM OF INJURY    Fall From standing      HISTORY:     Lanny Jaimes is a 69 y.o. female with a history of hypothyroidism who presents as a transfer from OSH for altered mental status and frequent falls, concern for possible ICH on imaging, she came from home after neighbor found her in her bedroom, on the floor due to a fall.  Altered mental status, no Deficits TSH 45.8 , CT head showed parietal contusion   Vital reports /80, Hr 73, hypertensive , slightly confused  Bilateral knee abrasion to knee caps    Traumatic loss of Consciousness: Unknown    Total Fluids Given Prior To Arrival  None mL    MEDICATIONS:   []  None     []  Information not available due to exam limitations documented above    Prior to Admission medications    Not on File       ALLERGIES:   []  None    []   Information not available due to exam limitations documented

## 2025-01-07 NOTE — PROGRESS NOTES
Trauma Tertiary Survey    Admit Date: 1/7/2025  Hospital day 0      Chief Complaint: \"FFSH\"    Subjective:     GCS 15, complaining of B/L knee pain    Objective:   PHYSICAL EXAM:   Physical Exam  Constitutional:       Appearance: She is well-developed.   HENT:      Head: Normocephalic.   Cardiovascular:      Rate and Rhythm: Normal rate.   Pulmonary:      Effort: Pulmonary effort is normal.   Abdominal:      Palpations: Abdomen is soft.   Musculoskeletal:      Cervical back: Normal range of motion and neck supple.   Neurological:      Mental Status: She is alert.     Spine:     Spine Tenderness ROM   Cervical 0 /10 Normal   Thoracic 0 /10 Normal   Lumbar 0 /10 Normal     Musculoskeletal    Joint Tenderness Swelling ROM   Right shoulder absent absent normal   Left shoulder absent absent normal   Right elbow absent absent normal   Left elbow absent absent normal   Right wrist absent absent normal   Left wrist absent absent normal   Right hand grasp absent absent normal   Left hand grasp absent absent normal   Right hip absent absent normal   Left hip absent absent normal   Right knee absent absent normal   Left knee absent absent normal   Right ankle absent absent normal   Left ankle absent absent normal   Right foot absent absent normal   Left foot absent absent normal       CONSULTS: NS    PROCEDURES: none     [] Reviewed radiology reports  (All radiology findings correlate to diagnoses/problem list)  XR PELVIS (1-2 VIEWS)    Result Date: 1/7/2025  No acute osseous abnormality of the pelvis.     CT CSpine W/O Contrast    Result Date: 1/7/2025  No acute abnormality of the cervical spine.     XR KNEE RIGHT (3 VIEWS)    Result Date: 1/7/2025  Tricompartmental osteoarthritis of the right knee. No acute finding.     XR KNEE LEFT (3 VIEWS)    Result Date: 1/7/2025  No acute fracture or dislocation at the left knee. Osteoarthritis the patellofemoral compartment and medial compartment.     XR CHEST PORTABLE    Result  Nephrology

## 2025-01-07 NOTE — ED PROVIDER NOTES
FACULTY SIGN-OUT  ADDENDUM     Care of this patient was assumed from Dr. Aaron.  The patient was seen for Fall (Multiple falls over past couple months) and Altered Mental Status  .  The patient's initial evaluation and plan have been discussed with the prior provider who initially evaluated the patient.  Nursing Notes, Past Medical Hx, Past Surgical Hx, Social Hx, Allergies, and Family Hx were all reviewed.        CHIEF COMPLAINT       Chief Complaint   Patient presents with    Fall     Multiple falls over past couple months    Altered Mental Status         PAST MEDICAL HISTORY    has a past medical history of Thyroid disease.    SURGICAL HISTORY      has no past surgical history on file.    CURRENT MEDICATIONS       Previous Medications    No medications on file       ALLERGIES     has no allergies on file.    FAMILY HISTORY     has no family status information on file.      family history is not on file.    SOCIAL HISTORY      reports that she has never smoked. She does not have any smokeless tobacco history on file. She reports that she does not currently use alcohol. She reports that she does not use drugs.      DIAGNOSTIC RESULTS     EKG: All EKG's are interpreted by the Emergency Department Physician who either signs or Co-signs this chart in the absence of a cardiologist.        RADIOLOGY:   Non-plain film images such as CT, Ultrasound and MRI are read by the radiologist. Plain radiographic images are visualized and the radiologist interpretations are reviewed as follows:   CT CSpine W/O Contrast   Final Result   No acute abnormality of the cervical spine.         CT Head W/O Contrast   Final Result   1. Possible small hemorrhagic contusion at the lateral aspect of the left   parietal lobe.   2. Chronic microvascular ischemic changes.         XR KNEE RIGHT (3 VIEWS)   Final Result   Tricompartmental osteoarthritis of the right knee. No acute finding.         XR KNEE LEFT (3 VIEWS)   Final Result   No    CMP   Result Value Ref Range    Sodium 142 135 - 144 mmol/L    Potassium 4.2 3.7 - 5.3 mmol/L    Chloride 101 98 - 107 mmol/L    CO2 27 20 - 31 mmol/L    Anion Gap 14 9 - 17 mmol/L    Glucose 166 (H) 70 - 99 mg/dL    BUN 16 8 - 23 mg/dL    Creatinine 0.8 0.5 - 0.9 mg/dL    Est, Glom Filt Rate 80 >60 mL/min/1.73m2    Calcium 9.6 8.6 - 10.4 mg/dL    Total Protein 8.6 (H) 6.4 - 8.3 g/dL    Albumin 4.7 3.5 - 5.2 g/dL    Albumin/Globulin Ratio 1.2 1.0 - 2.5    Total Bilirubin 0.4 0.3 - 1.2 mg/dL    Alkaline Phosphatase 97 35 - 104 U/L    ALT 17 5 - 33 U/L    AST 25 <32 U/L   Protime-INR   Result Value Ref Range    Protime 10.3 9.4 - 12.6 sec    INR 1.0    APTT   Result Value Ref Range    APTT 23.2 21.3 - 31.3 sec   TSH   Result Value Ref Range    TSH 45.82 (H) 0.30 - 5.00 uIU/mL   Troponin   Result Value Ref Range    Troponin, High Sensitivity 60 (HH) 0 - 14 ng/L   Urinalysis with Reflex to Culture    Specimen: Urine, clean catch   Result Value Ref Range    Color, UA Yellow Yellow    Turbidity UA Clear Clear    Glucose, Ur NEGATIVE NEGATIVE mg/dL    Bilirubin, Urine NEGATIVE NEGATIVE    Ketones, Urine NEGATIVE NEGATIVE mg/dL    Specific Gravity, UA 1.021 1.005 - 1.030    Urine Hgb NEGATIVE NEGATIVE    pH, Urine 6.0 5.0 - 8.0    Protein, UA 3+ (A) NEGATIVE mg/dL    Urobilinogen, Urine Normal 0.0 - 1.0 EU/dL    Nitrite, Urine NEGATIVE NEGATIVE    Leukocyte Esterase, Urine NEGATIVE NEGATIVE   Ammonia   Result Value Ref Range    Ammonia 14 11 - 51 umol/L   TOX SCR, BLD, ED   Result Value Ref Range    Acetaminophen Level <5 (L) 10 - 30 ug/mL    Ethanol Lvl <10 <10 mg/dL    Ethanol percent <0.010 <0.010 %    Salicylate Lvl <1.0 (L) 3 - 10 mg/dL   Urine Drug Screen   Result Value Ref Range    Amphetamine Screen, Ur NEGATIVE NEGATIVE    Barbiturate Screen, Ur NEGATIVE NEGATIVE    Benzodiazepine Screen, Urine NEGATIVE NEGATIVE    Cocaine Metabolite, Urine NEGATIVE NEGATIVE    Methadone Screen, Urine NEGATIVE NEGATIVE

## 2025-01-07 NOTE — CONSULTS
Department of Neurosurgery                                              Consult Note      Reason for Consult:  parietal lobe hemorrhagic contusion   Requesting Physician:  Dr. Matta   Neurosurgeon:   [] Dr. Walker  [x] Dr. Ascencio  [] Dr. Luis  [] Dr. Tang      History Obtained From:  patient    CHIEF COMPLAINT:         Chief Complaint   Patient presents with    Fall    Altered Mental Status       HISTORY OF PRESENT ILLNESS:       The patient is a 69 y.o. female who presents to [facility] as a transfer from [place].  Patient reports sustaining a fall at approximately 1 AM.  She was evaluated at [facility] where CT of the head demonstrated a left hemorrhagic contusion of the parietal lobe.  Patient denies any chemical anticoagulation history.  Denies any prior history of stroke.  She is resting comfortably in bed.  She denies any acute pain at this time. Denies any dysthesia or numbness to limbs. Able to actively move all extremities.     PAST MEDICAL HISTORY :       Past Medical History:        Diagnosis Date    Thyroid disease        Past Surgical History:    No past surgical history on file.    Social History:   Social History     Socioeconomic History    Marital status: Single     Spouse name: Not on file    Number of children: Not on file    Years of education: Not on file    Highest education level: Not on file   Occupational History    Not on file   Tobacco Use    Smoking status: Never    Smokeless tobacco: Not on file   Substance and Sexual Activity    Alcohol use: Not Currently    Drug use: Never    Sexual activity: Not on file   Other Topics Concern    Not on file   Social History Narrative    Not on file     Social Determinants of Health     Financial Resource Strain: Not on file   Food Insecurity: Not on file   Transportation Needs: Not on file   Physical Activity: Not on file   Stress: Not on file   Social Connections: Not on file   Intimate Partner Violence:  Status:  AAOx2               Cranial Nerves:    II: Visual acuity:  normal  II: Visual fields:  normal  III: Pupils:  equal, round, reactive to light  III,IV,VI: Extra Ocular Movements: intact  V: Facial sensation:  intact  VII: Facial strength: intact  VIII: Hearing:  intact  IX: Palate:  intact  XI: Shoulder shrug:  intact  XII: Tongue movement:  normal    Motor Exam:    Drift:  absent  Tone:  normal    Motor exam is symmetrical 5 out of 5 all extremities bilaterally    Sensory:    Right Upper Extremity:  normal  Left Upper Extremity:  normal  Right Lower Extremity:  normal  Left Lower Extremity:  normal    Deep Tendon Reflexes:    Right Bicep:  2+  Left Bicep:  2+  Right Knee:  1+  Left Knee:  1+    Plantar Response:    Right:  equivocal  Left:  equivocal    Clonus:  absent  Garcia's:  absent   SKIN: no rash       LABS AND IMAGING:     CBC with Differential:    Lab Results   Component Value Date/Time    WBC 14.2 01/07/2025 04:04 AM    RBC 4.74 01/07/2025 04:04 AM    HGB 14.3 01/07/2025 04:04 AM    HCT 43.6 01/07/2025 04:04 AM     01/07/2025 04:04 AM    MCV 92.0 01/07/2025 04:04 AM    MCH 30.3 01/07/2025 04:04 AM    MCHC 32.9 01/07/2025 04:04 AM    RDW 16.5 01/07/2025 04:04 AM    LYMPHOPCT 9 01/07/2025 04:04 AM    MONOPCT 6 01/07/2025 04:04 AM    EOSPCT 0 01/07/2025 04:04 AM    BASOPCT 0 01/07/2025 04:04 AM    MONOSABS 0.90 01/07/2025 04:04 AM    LYMPHSABS 1.30 01/07/2025 04:04 AM    EOSABS 0.00 01/07/2025 04:04 AM    BASOSABS 0.10 01/07/2025 04:04 AM     BMP:    Lab Results   Component Value Date/Time     01/07/2025 04:04 AM    K 4.2 01/07/2025 04:04 AM     01/07/2025 04:04 AM    CO2 27 01/07/2025 04:04 AM    BUN 16 01/07/2025 04:04 AM    CREATININE 0.8 01/07/2025 04:04 AM    CALCIUM 9.6 01/07/2025 04:04 AM    LABGLOM 80 01/07/2025 04:04 AM    GLUCOSE 166 01/07/2025 04:04 AM       Radiology Review:      IMPRESSION:  1. Possible small hemorrhagic contusion at the lateral aspect of the

## 2025-01-07 NOTE — ED TRIAGE NOTES
Transfer from [facility] , she came from home after neighbor found her in her bedroom, on the floor due to a fall  She admits to falling earlier tonight onto her knees. She denies striking her head or loss of consciousness. She complains of some abrasions to her knees   Transferred with 18 g PIV to left AC  Altered mental status, no Deficits TSH 45.8 , CT head showed parietal contusion   Vital reports /80, Hr 73     Once patient was settle into ER room 02, hypertensive , slightly confused  Bilateral knee abrasion to knee caps , clean dry dressing in placed   Does have clear speech, talking to residents and attendings  Reports she been out of her theroid medication x 1 month

## 2025-01-07 NOTE — ED NOTES
Lanny Jaimes, 69-year-old female multiple falls over the last month or so.  Had a fall last night unable to get himself up called her neighbor CT scan shows a small hemorrhagic contusion.  She has no neurological deficits.  She is not on any medications at this time.  Patient does have a significantly elevated TSH   Opt out

## 2025-01-08 ENCOUNTER — APPOINTMENT (OUTPATIENT)
Dept: CT IMAGING | Age: 70
DRG: 082 | End: 2025-01-08
Payer: MEDICARE

## 2025-01-08 PROBLEM — I10 PRIMARY HYPERTENSION: Status: ACTIVE | Noted: 2025-01-08

## 2025-01-08 PROBLEM — E85.4 CEREBRAL AMYLOID ANGIOPATHY (HCC): Status: ACTIVE | Noted: 2025-01-08

## 2025-01-08 PROBLEM — E03.9 ACQUIRED HYPOTHYROIDISM: Status: ACTIVE | Noted: 2025-01-08

## 2025-01-08 PROBLEM — I68.0 CEREBRAL AMYLOID ANGIOPATHY (HCC): Status: ACTIVE | Noted: 2025-01-08

## 2025-01-08 LAB
ANION GAP SERPL CALCULATED.3IONS-SCNC: 13 MMOL/L (ref 9–16)
BUN SERPL-MCNC: 17 MG/DL (ref 8–23)
CALCIUM SERPL-MCNC: 9.3 MG/DL (ref 8.6–10.4)
CHLORIDE SERPL-SCNC: 108 MMOL/L (ref 98–107)
CHOLEST SERPL-MCNC: 386 MG/DL (ref 0–199)
CHOLESTEROL/HDL RATIO: 6.8
CO2 SERPL-SCNC: 24 MMOL/L (ref 20–31)
CREAT SERPL-MCNC: 0.9 MG/DL (ref 0.6–0.9)
EKG ATRIAL RATE: 88 BPM
EKG P AXIS: 30 DEGREES
EKG P-R INTERVAL: 126 MS
EKG Q-T INTERVAL: 388 MS
EKG QRS DURATION: 88 MS
EKG QTC CALCULATION (BAZETT): 469 MS
EKG R AXIS: -17 DEGREES
EKG T AXIS: 118 DEGREES
EKG VENTRICULAR RATE: 88 BPM
ERYTHROCYTE [DISTWIDTH] IN BLOOD BY AUTOMATED COUNT: 16.1 % (ref 11.8–14.4)
EST. AVERAGE GLUCOSE BLD GHB EST-MCNC: 126 MG/DL
GFR, ESTIMATED: 70 ML/MIN/1.73M2
GLUCOSE SERPL-MCNC: 126 MG/DL (ref 74–99)
HBA1C MFR BLD: 6 % (ref 4–6)
HCT VFR BLD AUTO: 40.2 % (ref 36.3–47.1)
HDLC SERPL-MCNC: 57 MG/DL
HGB BLD-MCNC: 12.4 G/DL (ref 11.9–15.1)
LDLC SERPL CALC-MCNC: 275 MG/DL (ref 0–100)
MCH RBC QN AUTO: 30 PG (ref 25.2–33.5)
MCHC RBC AUTO-ENTMCNC: 30.8 G/DL (ref 28.4–34.8)
MCV RBC AUTO: 97.3 FL (ref 82.6–102.9)
MICROORGANISM SPEC CULT: NO GROWTH
NRBC BLD-RTO: 0 PER 100 WBC
PLATELET # BLD AUTO: 299 K/UL (ref 138–453)
PMV BLD AUTO: 9.6 FL (ref 8.1–13.5)
POTASSIUM SERPL-SCNC: 3.5 MMOL/L (ref 3.7–5.3)
RBC # BLD AUTO: 4.13 M/UL (ref 3.95–5.11)
SODIUM SERPL-SCNC: 145 MMOL/L (ref 136–145)
SPECIMEN DESCRIPTION: NORMAL
TRIGL SERPL-MCNC: 269 MG/DL
TROPONIN I SERPL HS-MCNC: 28 NG/L (ref 0–14)
VLDLC SERPL CALC-MCNC: 54 MG/DL (ref 1–30)
WBC OTHER # BLD: 12.1 K/UL (ref 3.5–11.3)

## 2025-01-08 PROCEDURE — 36415 COLL VENOUS BLD VENIPUNCTURE: CPT

## 2025-01-08 PROCEDURE — 6370000000 HC RX 637 (ALT 250 FOR IP)

## 2025-01-08 PROCEDURE — 84484 ASSAY OF TROPONIN QUANT: CPT

## 2025-01-08 PROCEDURE — 97162 PT EVAL MOD COMPLEX 30 MIN: CPT

## 2025-01-08 PROCEDURE — 80061 LIPID PANEL: CPT

## 2025-01-08 PROCEDURE — 97535 SELF CARE MNGMENT TRAINING: CPT

## 2025-01-08 PROCEDURE — 97116 GAIT TRAINING THERAPY: CPT

## 2025-01-08 PROCEDURE — 2000000000 HC ICU R&B

## 2025-01-08 PROCEDURE — 2060000000 HC ICU INTERMEDIATE R&B

## 2025-01-08 PROCEDURE — 80048 BASIC METABOLIC PNL TOTAL CA: CPT

## 2025-01-08 PROCEDURE — 92523 SPEECH SOUND LANG COMPREHEN: CPT

## 2025-01-08 PROCEDURE — 93010 ELECTROCARDIOGRAM REPORT: CPT | Performed by: INTERNAL MEDICINE

## 2025-01-08 PROCEDURE — 99233 SBSQ HOSP IP/OBS HIGH 50: CPT | Performed by: PSYCHIATRY & NEUROLOGY

## 2025-01-08 PROCEDURE — 6360000002 HC RX W HCPCS

## 2025-01-08 PROCEDURE — 70450 CT HEAD/BRAIN W/O DYE: CPT

## 2025-01-08 PROCEDURE — 99223 1ST HOSP IP/OBS HIGH 75: CPT | Performed by: PSYCHIATRY & NEUROLOGY

## 2025-01-08 PROCEDURE — 97166 OT EVAL MOD COMPLEX 45 MIN: CPT

## 2025-01-08 PROCEDURE — 85027 COMPLETE CBC AUTOMATED: CPT

## 2025-01-08 PROCEDURE — 2500000003 HC RX 250 WO HCPCS

## 2025-01-08 PROCEDURE — 83036 HEMOGLOBIN GLYCOSYLATED A1C: CPT

## 2025-01-08 RX ORDER — AMLODIPINE BESYLATE 10 MG/1
5 TABLET ORAL DAILY
Status: DISCONTINUED | OUTPATIENT
Start: 2025-01-08 | End: 2025-01-08

## 2025-01-08 RX ORDER — ENOXAPARIN SODIUM 100 MG/ML
40 INJECTION SUBCUTANEOUS DAILY
Status: DISCONTINUED | OUTPATIENT
Start: 2025-01-08 | End: 2025-01-17 | Stop reason: HOSPADM

## 2025-01-08 RX ORDER — ATORVASTATIN CALCIUM 10 MG/1
20 TABLET, FILM COATED ORAL NIGHTLY
Status: DISCONTINUED | OUTPATIENT
Start: 2025-01-08 | End: 2025-01-09

## 2025-01-08 RX ORDER — AMLODIPINE BESYLATE 10 MG/1
10 TABLET ORAL DAILY
Status: DISCONTINUED | OUTPATIENT
Start: 2025-01-09 | End: 2025-01-17 | Stop reason: HOSPADM

## 2025-01-08 RX ORDER — GINSENG 100 MG
CAPSULE ORAL PRN
Status: DISCONTINUED | OUTPATIENT
Start: 2025-01-08 | End: 2025-01-17 | Stop reason: HOSPADM

## 2025-01-08 RX ORDER — LEVOTHYROXINE SODIUM 88 UG/1
88 TABLET ORAL DAILY
Status: ON HOLD | COMMUNITY

## 2025-01-08 RX ORDER — ATORVASTATIN CALCIUM 10 MG/1
40 TABLET, FILM COATED ORAL NIGHTLY
Status: DISCONTINUED | OUTPATIENT
Start: 2025-01-08 | End: 2025-01-08

## 2025-01-08 RX ORDER — LISINOPRIL 20 MG/1
10 TABLET ORAL DAILY
Status: DISCONTINUED | OUTPATIENT
Start: 2025-01-08 | End: 2025-01-09

## 2025-01-08 RX ORDER — LEVOTHYROXINE SODIUM 50 UG/1
50 TABLET ORAL DAILY
Status: DISCONTINUED | OUTPATIENT
Start: 2025-01-09 | End: 2025-01-14

## 2025-01-08 RX ORDER — AMLODIPINE BESYLATE 10 MG/1
5 TABLET ORAL ONCE
Status: COMPLETED | OUTPATIENT
Start: 2025-01-08 | End: 2025-01-08

## 2025-01-08 RX ADMIN — LISINOPRIL 10 MG: 20 TABLET ORAL at 17:21

## 2025-01-08 RX ADMIN — ENOXAPARIN SODIUM 40 MG: 100 INJECTION SUBCUTANEOUS at 08:21

## 2025-01-08 RX ADMIN — AMLODIPINE BESYLATE 5 MG: 10 TABLET ORAL at 08:21

## 2025-01-08 RX ADMIN — POTASSIUM BICARBONATE 20 MEQ: 782 TABLET, EFFERVESCENT ORAL at 08:21

## 2025-01-08 RX ADMIN — SODIUM CHLORIDE, PRESERVATIVE FREE 5 ML: 5 INJECTION INTRAVENOUS at 08:22

## 2025-01-08 RX ADMIN — ATORVASTATIN CALCIUM 20 MG: 10 TABLET, FILM COATED ORAL at 20:40

## 2025-01-08 RX ADMIN — ACETAMINOPHEN 650 MG: 325 TABLET ORAL at 16:57

## 2025-01-08 RX ADMIN — BACITRACIN: 500 OINTMENT TOPICAL at 08:21

## 2025-01-08 RX ADMIN — BACITRACIN: 500 OINTMENT TOPICAL at 04:14

## 2025-01-08 RX ADMIN — SODIUM CHLORIDE, PRESERVATIVE FREE 10 ML: 5 INJECTION INTRAVENOUS at 20:40

## 2025-01-08 RX ADMIN — HYDRALAZINE HYDROCHLORIDE 10 MG: 20 INJECTION INTRAMUSCULAR; INTRAVENOUS at 17:16

## 2025-01-08 RX ADMIN — AMLODIPINE BESYLATE 5 MG: 10 TABLET ORAL at 17:21

## 2025-01-08 ASSESSMENT — PAIN SCALES - GENERAL
PAINLEVEL_OUTOF10: 4
PAINLEVEL_OUTOF10: 0

## 2025-01-08 ASSESSMENT — PAIN DESCRIPTION - LOCATION: LOCATION: KNEE

## 2025-01-08 ASSESSMENT — PAIN DESCRIPTION - ORIENTATION: ORIENTATION: LEFT;RIGHT

## 2025-01-08 ASSESSMENT — PAIN DESCRIPTION - DESCRIPTORS: DESCRIPTORS: ACHING

## 2025-01-08 NOTE — PROGRESS NOTES
Limits  Hearing  Hearing: Within functional limits    Cognition   Cognition  Overall Cognitive Status: Exceptions  Arousal/Alertness: Appropriate responses to stimuli  Following Commands: Follows one step commands with increased time;Follows one step commands with repetition  Attention Span: Attends with cues to redirect  Memory: Appears intact  Safety Judgement: Decreased awareness of need for safety  Problem Solving: Decreased awareness of errors;Assistance required to correct errors made;Assistance required to identify errors made  Insights: Decreased awareness of deficits  Initiation: Requires cues for some  Sequencing: Does not require cues    Objective               AROM RLE (degrees)  RLE AROM: WFL  AROM LLE (degrees)  LLE AROM : WFL  AROM RUE (degrees)  RUE AROM : WFL  AROM LUE (degrees)  LUE AROM : WFL  Strength RLE  Strength RLE: WFL  R Hip Flexion: 4/5  R Knee Extension: 4/5  R Ankle Dorsiflexion: 4/5  R Ankle Plantar flexion: 4/5  Strength LLE  Strength LLE: WFL  L Hip Flexion: 4/5  L Knee Extension: 4/5  L Ankle Dorsiflexion: 4/5  L Ankle Plantar Flexion: 4/5  Strength RUE  Comment: Co-eval with OT, see OT note for UE detail.  Strength LUE  Comment: Co-eval with OT, see OT note for UE detail.      Bed mobility  Supine to Sit: Stand by assistance  Sit to Supine: Stand by assistance  Scooting: Stand by assistance  Bed Mobility Comments: HOB elevated ~ 30 degrees  Transfers  Sit to Stand: Contact guard assistance  Stand to Sit: Contact guard assistance  Comment: Transfers performed with a RW.  Ambulation  Surface: Level tile  Device: Rolling Walker  Assistance: Contact guard assistance  Quality of Gait: Mild instability, crosses midline x3, narrow SARAH, no true LOB.  Gait Deviations: Decreased step length  Distance: 250 feet  More Ambulation?: No  Stairs/Curb  Stairs?: No     Balance  Posture: Fair  Sitting - Static: Good  Sitting - Dynamic: Good;-  Standing - Static: Fair  Standing - Dynamic:  Fair;-  Comments: standing balance assessed while using a RW.                                                AM-PAC - Mobility    AM-PAC Basic Mobility - Inpatient   How much help is needed turning from your back to your side while in a flat bed without using bedrails?: None  How much help is needed moving from lying on your back to sitting on the side of a flat bed without using bedrails?: A Little  How much help is needed moving to and from a bed to a chair?: A Little  How much help is needed standing up from a chair using your arms?: A Little  How much help is needed walking in hospital room?: A Little  How much help is needed climbing 3-5 steps with a railing?: A Lot  AM-PAC Inpatient Mobility Raw Score : 18  AM-PAC Inpatient T-Scale Score : 43.63  Mobility Inpatient CMS 0-100% Score: 46.58  Mobility Inpatient CMS G-Code Modifier : CK           Goals  Short Term Goals  Time Frame for Short Term Goals: 14 visits  Short Term Goal 1: Pt will perform sit<>stand transfer with supervision.  Short Term Goal 2: Pt will ambulate 300 feet with least restrictive AD and supervision.  Short Term Goal 3: Pt will demonstrate good- dynamic standing balance to decrease fall risk.  Short Term Goal 4: Pt will tolerate a 35 minute therapy session to promote increased endurance.  Short Term Goal 5: Pt will perform supine<>sit transfer with supervision.       Education  Patient Education  Education Given To: Patient  Education Provided: Role of Therapy;Plan of Care;Transfer Training;Mobility Training;Fall Prevention Strategies  Education Method: Verbal  Barriers to Learning: Cognition  Education Outcome: Continued education needed      Therapy Time   Individual Concurrent Group Co-treatment   Time In 1045         Time Out 1109         Minutes 24         Timed Code Treatment Minutes: 8 Minutes       Oliver Dunbar PT

## 2025-01-08 NOTE — CARE COORDINATION
Met with pt to complete an SBIRT due to falls and a PHQ 9 assessment.  Pt states that she lives in an apartment at [facility] in [place].  She states that she has family that are very supportive.  She denies alcohol and drug use and denies depression.  Pt states that her cane is missing and she states that she had it when she was brought in by EMS.  She states that she walked to the ambulance and used her cane.  Questioned pt regarding calling 911 and the paramedics allowing her to walk to the ambulance.  Pt maintains that this happened.  Pt states she went to [place] [facility] first.  Called [place] [facility] and talked to the .  She states that they do not have a cane there but to check with security.  Called security but they were busy, will try again.  Called pt's son Sha and he plans to look at her apt. He is coming to the hospital today and let us know if he finds her cane.    SBIRT is negative.          Alcohol Screening and Brief Intervention      No results for input(s): \"ALC\" in the last 72 hours.    Alcohol Pre-screening     (WOMEN ONLY) How many times in the past year have you had 4 or more drinks in a day?: None       Drug Pre-Screening        Drug Screening DAST       Mood Pre-Screening (PHQ-2)  During the past 2 weeks, have you been bothered by, feeling down, depressed or hopeless?  No    I have interviewed Lanny Jaimes, [id number] regarding  Her alcohol consumption/drug use and risk for excessive use. Screenings were negative.  Patient  N/A intervention at this time.   Deferred []    Completed on: 1/8/2025   LUIS ENRIQUE HOWARD  Patient Health Questionnaire-9 (PHQ-9)    Over the last 2 weeks, how often have you been bothered by any of the following problems?    1. Little Interest or pleasure in doing things?   [x] Not at all  [] Several Days  [] More than half the day  []  Nearly every day    2. Feeling down, depressed or hopeless?    [x] Not at all  [] Several Days  [] More than half the day  []

## 2025-01-08 NOTE — CARE COORDINATION
Case Management Assessment  Initial Evaluation    Date/Time of Evaluation: 1/8/2025 10:31 AM  Assessment Completed by: Deya Farley RN    If patient is discharged prior to next notation, then this note serves as note for discharge by case management.    Patient Name: Lanny Jaimes                   YOB: 1955  Diagnosis: Acute intracranial hemorrhage (HCC) [I62.9]  Focal hemorrhagic contusion of cerebrum [S06.33AA]                   Date / Time: 1/7/2025  9:21 AM    Patient Admission Status: Inpatient   Readmission Risk (Low < 19, Mod (19-27), High > 27): Readmission Risk Score: 8.8    Current PCP: No primary care provider on file.  PCP verified by CM? Yes    Chart Reviewed: Yes      History Provided by: Patient  Patient Orientation: Alert and Oriented    Patient Cognition: Alert    Hospitalization in the last 30 days (Readmission):  No    If yes, Readmission Assessment in CM Navigator will be completed.    Advance Directives:      Code Status: Full Code   Patient's Primary Decision Maker is: Legal Next of Kin    Primary Decision Maker: kim Friedman [facility] - [phone]    Discharge Planning:    Patient lives with: Alone Type of Home: House  Primary Care Giver: Self  Patient Support Systems include: Children, Family Members   Current Financial resources: Medicare  Current community resources: None  Current services prior to admission: Durable Medical Equipment            Current DME: Walker            Type of Home Care services:  None    ADLS  Prior functional level: Independent in ADLs/IADLs  Current functional level: Other (see comment) (needs therapy evaluation)    PT AM-PAC:   /24  OT AM-PAC:   /24    Family can provide assistance at DC: No  Would you like Case Management to discuss the discharge plan with any other family members/significant others, and if so, who? Yes (son)  Plans to Return to Present Housing: Yes (unless therapy deems otherwise.)  Other Identified Issues/Barriers to RETURNING to

## 2025-01-08 NOTE — CARDIO/PULMONARY
Patient's son Sha is at bed side asking about placement options.  Informed him of options and provided list for all.  I have explained the difference and he is asking for SNF or ARU.   He has chosen [facility] or [facility].  Referrals sent

## 2025-01-08 NOTE — PLAN OF CARE
Problem: Discharge Planning  Goal: Discharge to home or other facility with appropriate resources  Outcome: Progressing  Flowsheets (Taken 1/7/2025 1936)  Discharge to home or other facility with appropriate resources: Identify barriers to discharge with patient and caregiver     Problem: Neurosensory - Adult  Goal: Achieves stable or improved neurological status  Outcome: Progressing  Flowsheets (Taken 1/7/2025 1936)  Achieves stable or improved neurological status:   Assess for and report changes in neurological status   Initiate measures to prevent increased intracranial pressure  Goal: Absence of seizures  Outcome: Progressing  Flowsheets (Taken 1/7/2025 1936)  Absence of seizures: Monitor for seizure activity.  If seizure occurs, document type and location of movements and any associated apnea  Goal: Remains free of injury related to seizures activity  Outcome: Progressing  Flowsheets (Taken 1/7/2025 1936)  Remains free of injury related to seizure activity: Maintain airway, patient safety  and administer oxygen as ordered  Goal: Achieves maximal functionality and self care  Outcome: Progressing  Flowsheets (Taken 1/7/2025 1936)  Achieves maximal functionality and self care: Monitor swallowing and airway patency with patient fatigue and changes in neurological status     Problem: Respiratory - Adult  Goal: Achieves optimal ventilation and oxygenation  Outcome: Progressing  Flowsheets (Taken 1/7/2025 1936)  Achieves optimal ventilation and oxygenation:   Assess for changes in respiratory status   Assess for changes in mentation and behavior     Problem: Cardiovascular - Adult  Goal: Maintains optimal cardiac output and hemodynamic stability  Outcome: Progressing  Flowsheets (Taken 1/7/2025 1936)  Maintains optimal cardiac output and hemodynamic stability:   Monitor blood pressure and heart rate   Monitor urine output and notify Licensed Independent Practitioner for values outside of normal range  Goal: Absence

## 2025-01-08 NOTE — PROGRESS NOTES
[facility]    Admission Medication Reconciliation       The patient's list of current home medications has been reviewed.     Source(s) of information: Patient. Nothing on dispense report.     Based on information provided by the above source(s), I have updated the patient's home med list as described below.     Please review the ACTION REQUESTED section of this note below for any discrepancies on current hospital orders.      I changed or updated the following medications on the patient's home medication list:  Removed N/A     Added Levothyroxine 88mcg     Adjusted   N/A   Other Notes Specifically stated she was not prescribed hypertensive medications.            Please feel free to call me with any questions about this encounter. Thank you.        Electronically signed by RENETTA BULLOCK on 1/8/2025 at 6:00 PM

## 2025-01-08 NOTE — CARE COORDINATION
Acute Inpatient Rehabilitation referral received via Fax    \"Inpatient Consult to PM&R - Physiatry [[id number]]\" Consult Order Status: Verified as ordered, patient confirmed on consult list    PM&R physiatrist Dr. Daniela Garcia on service for PM&R consult.    Updated Deya 1 B CM: Via Voicemail at this time at phone/extension: [phone]

## 2025-01-08 NOTE — H&P
[facility] Neu[facility]   IN-PATIENT SERVICE   [facility]    HISTORY AND PHYSICAL EXAMINATION            Date:   1/8/2025  Patient name:  Lanny Jaimes  Date of admission:  1/7/2025  9:21 AM  MRN:   [id number]  Account:  [id number]  YOB: 1955  PCP:    No primary care provider on file.  Room:   [address] [place] [address]  Code Status:    Full Code    Chief Complaint:     Chief Complaint   Patient presents with    Fall     Frequent falls     Altered Mental Status       History Obtained From:     EMR    History of Present Illness:     69-year-old female presented as a transfer from outside facility as CT head read as small left parietal hemorrhagic contusion after a fall at home.  There were initial concerns for traumatic hemorrhage per notes however unusual appearance on imaging for a traumatic injury. MRI more consistent with CAA.  Initial SBP 220s.  ICH score 0.  Was evaluated by trauma, no acute traumatic injuries and neurocritical care was consulted for admission to neuro ICU.  Patient son at bedside endorsed history of uncontrolled hypertension with noncompliance with hypertension medication for over a year.  Additionally endorses noncompliance with other medications including Synthroid.  Patient underwent thyroid radiation in 2000 for hyperthyroidism resulting in iatrogenic hypothyroidism and started on Synthroid.  Per son, patient's mentation is at her baseline stating \"she is always been like this.\"  However he does report her family reports a progressive worsening of cognitive decline and frequent falls over the past year.  On initial evaluation, patient exhibited psychomotor slowing with delayed responses to questioning.  This is likely secondary to hypothyroidism not controlled with replacement therapy.  Otherwise alert and oriented, nonfocal neuroexam.  Initially required Cardene to meet blood pressure goal of less than 160.  Was restarted on her home Synthroid and started on 5          Investigations:      Laboratory Testing:  Recent Results (from the past 24 hour(s))   Basic metabolic panel    Collection Time: 01/08/25  4:55 AM   Result Value Ref Range    Sodium 145 136 - 145 mmol/L    Potassium 3.5 (L) 3.7 - 5.3 mmol/L    Chloride 108 (H) 98 - 107 mmol/L    CO2 24 20 - 31 mmol/L    Anion Gap 13 9 - 16 mmol/L    Glucose 126 (H) 74 - 99 mg/dL    BUN 17 8 - 23 mg/dL    Creatinine 0.9 0.6 - 0.9 mg/dL    Est, Glom Filt Rate 70 >60 mL/min/1.73m2    Calcium 9.3 8.6 - 10.4 mg/dL   CBC    Collection Time: 01/08/25  4:55 AM   Result Value Ref Range    WBC 12.1 (H) 3.5 - 11.3 k/uL    RBC 4.13 3.95 - 5.11 m/uL    Hemoglobin 12.4 11.9 - 15.1 g/dL    Hematocrit 40.2 36.3 - 47.1 %    MCV 97.3 82.6 - 102.9 fL    MCH 30.0 25.2 - 33.5 pg    MCHC 30.8 28.4 - 34.8 g/dL    RDW 16.1 (H) 11.8 - 14.4 %    Platelets 299 138 - 453 k/uL    MPV 9.6 8.1 - 13.5 fL    NRBC Automated 0.0 0.0 per 100 WBC   Hemoglobin A1c    Collection Time: 01/08/25  4:55 AM   Result Value Ref Range    Hemoglobin A1C 6.0 4.0 - 6.0 %    Estimated Avg Glucose 126 mg/dL   Lipid Panel    Collection Time: 01/08/25  4:55 AM   Result Value Ref Range    Cholesterol, Total 386 (H) 0 - 199 mg/dL    HDL 57 >40 mg/dL    LDL Cholesterol 275 (H) 0 - 100 mg/dL    Chol/HDL Ratio 6.8     Triglycerides 269 (H) <150 mg/dL    VLDL 54 (H) 1 - 30 mg/dL   Troponin    Collection Time: 01/08/25  4:55 AM   Result Value Ref Range    Troponin, High Sensitivity 28 (H) 0 - 14 ng/L         Assessment :      Primary Problem  Acute intracranial hemorrhage (HCC)    Active Hospital Problems    Diagnosis Date Noted    Focal hemorrhagic contusion of cerebrum [S06.33AA] 01/07/2025    Acute intracranial hemorrhage (HCC) [I62.9] 01/07/2025       MRI brain consistent with CAA.     Cerebral microhemorrhages 2/2 CAA vs hypertensive angiopathy      Plan:       L Parietal lobe small hyperdensity in the setting of CAA   - Keep SBP <160  - Repeat Head CT stable  - NSG was consulted

## 2025-01-08 NOTE — PROGRESS NOTES
Facility/Department: [address] NEURO ICU  Initial Speech/Language/Cognitive Assessment    NAME: Lanny Jaimes  : 1955   MRN: [id number]  ADMISSION DATE: 2025  ADMITTING DIAGNOSIS: has Focal hemorrhagic contusion of cerebrum and Acute intracranial hemorrhage (HCC) on their problem list.      Date of Eval: 2025   Evaluating Therapist: VERENICE Romano    RECENT RESULTS  CT OF HEAD/MRI: RECOMMENDATIONS:  Follow-up brain MRI with contrast in 1-3 months to ensure stability of the  enhancing lesion.    Primary Complaint: 69-year-old female presented as a transfer from outside facility as CT head read as small left parietal hemorrhagic contusion after a fall at home.  There were initial concerns for traumatic hemorrhage per notes however unusual appearance on imaging for a traumatic injury. MRI more consistent with CAA.  Initial SBP 220s.  ICH score 0.  Was evaluated by trauma, no acute traumatic injuries and neurocritical care was consulted for admission to neuro ICU.  Patient son at bedside endorsed history of uncontrolled hypertension with noncompliance with hypertension medication for over a year.  Additionally endorses noncompliance with other medications including Synthroid.  Patient underwent thyroid radiation in  for hyperthyroidism resulting in iatrogenic hypothyroidism and started on Synthroid.  Per son, patient's mentation is at her baseline stating \"she is always been like this.\"  However he does report her family reports a progressive worsening of cognitive decline and frequent falls over the past year.  On initial evaluation, patient exhibited psychomotor slowing with delayed responses to questioning.  This is likely secondary to hypothyroidism not controlled with replacement therapy.  Otherwise alert and oriented, nonfocal neuroexam.  Initially required Cardene to meet blood pressure goal of less than 160.  Was restarted on her home Synthroid and started on 5 Norvasc.  Stable for transfer  immediate)  Short-term Memory: Moderate (0/3,1/3,2/3)  Problem Solving  Problem Solving: Exceptions to WFL  Verbal Reasoning Skills: Moderate (word associations 2/3, word deductions 2/4, inductive reasoning 4/4, similarities and differences 2/2, antonyms 2/4)  Sequencing: WFL  Abstract Reasoning  Abstract Reasoning: Exceptions to WFL  Divergent Thinking: WFL (3/30 seconds)  Safety/Judgment  Safety/Judgment: Exceptions to WFL  Insight: WFL  Flexibility of Thought: Mild (4/6)      Prognosis:  Speech Therapy Prognosis  Prognosis: Fair  Individuals consulted  Consulted and agree with results and recommendations: Patient;RN    Education:      yes       Therapy Time:   Individual Concurrent Group Co-treatment   Time In  1000         Time Out  1021         Minutes  21                 Electronically signed by VERENICE Romano on 1/8/2025 at 1:18 PM

## 2025-01-08 NOTE — PROGRESS NOTES
Daily Progress Note  Neuro Critical Care    Patient Name: Lanny Jaimes  Patient : 1955  Room/Bed: 0115/0115-01  Code Status: Full  Allergies:   Allergies   Allergen Reactions    Pcn [Penicillins] Anaphylaxis       CHIEF COMPLAINT:      Fall, AMS      INTERVAL HISTORY    Initial Presentation (Admitted 2024):  The patient is a 69 y.o. female with a history of hypertension hypothyroidism who presented as a transfer from an [facility] where she was seen for altered mental status following a fall at home.  Per patient she was attempting to get out of her bed when she fell from standing height landing on her knees and falling to her backside, denies hitting her head and denies loss of consciousness.  Following the fall patient states that she was unable to get up and was found by her neighbors who called the EMS.  At the [facility] patient was noted to be slightly confused, CT head shows a possible small left parietal hemorrhagic contusion and patient was then transferred to [facility] for trauma evaluation which revealed no acute traumatic injuries and neurocritical care was consulted for evaluation and admission to the neuro ICU.  On my initial examination patient is alert and oriented x 3 with delayed and intermittently inappropriate responses with no other focal neurological deficits.  Patient's son at bedside endorses a history of high blood pressure as well as thyroid issues for which she underwent thyroid radiation prior to , however states that patient has not seen a provider in multiple years and no longer takes any antihypertensives or thyroid medications.  At the [facility] patient was noted to be significantly hypertensive  and at the time of evaluation is requiring nicardipine infusion to maintain systolic blood pressure less than 160.  Patient's son additionally endorses that family said concerns for progressive cognitive decline and frequent falls over the  oriented x 3, in no acute distress.  Intermittently delayed and inappropriate responses   HEAD:  normocephalic, atraumatic    EYES:  PERRLA, EOMI.   ENT:  moist mucous membranes   LUNGS:  Equal air entry bilaterally   CARDIOVASCULAR:  normal s1 / s2   ABDOMEN:  Soft, no rigidity   NECK supple, symmetric, no midline tenderness to palpation    BACK without midline tenderness, step-offs or deformities    EXTREMITIES Normal ROM with no deformities   NEUROLOGIC:  Mental Status:  A & O x3,awake             Cranial Nerves:    cranial nerves II-XII are grossly intact     Motor Exam:    Drift:  absent  Tone:  normal     Motor exam is symmetrical 5 out of 5 all extremities bilaterally     Sensory:    Touch:    Right Upper Extremity:  normal  Left Upper Extremity:  normal  Right Lower Extremity:  normal  Left Lower Extremity:  normal      SKIN Bilateral knee abrasions and ecchymosis   NIH Stroke Scale Total (if not done complete detailed one below):    1a.  Level of consciousness:  0 - alert; keenly responsive  1b.  Level of consciousness questions:  0 - answers both questions correctly  1c.  Level of consciousness questions:  0 - performs both tasks correctly  2.    Best Gaze:  0 - normal  3.    Visual:  0 - no visual loss  4.    Facial Palsy:  0 - normal symmetric movement  5a.  Motor left arm:  0 - no drift, limb holds 90 (or 45) degrees for full 10 seconds  5b.  Motor right arm:  0 - no drift, limb holds 90 (or 45) degrees for full 10 seconds  6a.  Motor left le - no drift; leg holds 30 degree position for full 5 seconds  6b.  Motor right le - no drift; leg holds 30 degree position for full 5 seconds  7.    Limb Ataxia:  0 - absent  8.    Sensory:  0 - normal; no sensory loss  9.    Best Language:  0 - no aphasia, normal  10.  Dysarthria:  0 - normal  11.  Extinction and Inattention:  0 - no abnormality  TOTAL: 0    DRAINS:  [] There are no drains for Neuro Critical Care to monitor at this time.     ASSESSMENT

## 2025-01-08 NOTE — CARE COORDINATION
Spoke to patient's son Sha.  He tells me that he delay in speech is her baseline.  I asked about ambulation.  He tells me that she will no work with therapy at home or go tot OP.  I asked if he thinks a SNF will be needed pending therapy evaluation.  HE tells me she will want to go.   I informed him that I will await therapy then call him back to discuss

## 2025-01-09 ENCOUNTER — APPOINTMENT (OUTPATIENT)
Age: 70
DRG: 082 | End: 2025-01-09
Payer: MEDICARE

## 2025-01-09 ENCOUNTER — APPOINTMENT (OUTPATIENT)
Dept: INTERVENTIONAL RADIOLOGY/VASCULAR | Age: 70
DRG: 082 | End: 2025-01-09
Payer: MEDICARE

## 2025-01-09 ENCOUNTER — APPOINTMENT (OUTPATIENT)
Dept: CT IMAGING | Age: 70
DRG: 082 | End: 2025-01-09
Payer: MEDICARE

## 2025-01-09 PROBLEM — I67.1 CEREBRAL ANEURYSM: Status: ACTIVE | Noted: 2025-01-09
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
## 2025-01-09 PROBLEM — I16.0 HYPERTENSIVE URGENCY: Status: ACTIVE | Noted: 2025-01-09

## 2025-01-09 LAB
ANION GAP SERPL CALCULATED.3IONS-SCNC: 12 MMOL/L (ref 9–16)
BUN SERPL-MCNC: 23 MG/DL (ref 8–23)
CALCIUM SERPL-MCNC: 9 MG/DL (ref 8.6–10.4)
CHLORIDE SERPL-SCNC: 104 MMOL/L (ref 98–107)
CO2 SERPL-SCNC: 26 MMOL/L (ref 20–31)
CREAT SERPL-MCNC: 0.9 MG/DL (ref 0.6–0.9)
ECHO AO ROOT DIAM: 2.7 CM
ECHO AO ROOT INDEX: 1.52 CM/M2
ECHO AV AREA PEAK VELOCITY: 1.1 CM2
ECHO AV AREA PLAN: 0.9 CM2
ECHO AV AREA PLAN: 0.9 CM2
ECHO AV AREA VTI: 1.1 CM2
ECHO AV AREA/BSA PEAK VELOCITY: 0.6 CM2/M2
ECHO AV AREA/BSA VTI: 0.6 CM2/M2
ECHO AV MEAN GRADIENT: 7 MMHG
ECHO AV MEAN VELOCITY: 1.2 M/S
ECHO AV PEAK GRADIENT: 13 MMHG
ECHO AV PEAK VELOCITY: 1.8 M/S
ECHO AV VELOCITY RATIO: 0.39
ECHO AV VTI: 29.6 CM
ECHO BSA: 1.79 M2
ECHO EST RA PRESSURE: 3 MMHG
ECHO LA AREA 2C: 9.8 CM2
ECHO LA AREA 4C: 13.3 CM2
ECHO LA DIAMETER INDEX: 2.13 CM/M2
ECHO LA DIAMETER: 3.8 CM
ECHO LA MAJOR AXIS: 4.7 CM
ECHO LA MINOR AXIS: 3.7 CM
ECHO LA TO AORTIC ROOT RATIO: 1.41
ECHO LA VOL MOD A2C: 20 ML (ref 22–52)
ECHO LA VOL MOD A4C: 30 ML (ref 22–52)
ECHO LA VOLUME INDEX MOD A2C: 11 ML/M2 (ref 16–34)
ECHO LA VOLUME INDEX MOD A4C: 17 ML/M2 (ref 16–34)
ECHO LV E' LATERAL VELOCITY: 5 CM/S
ECHO LV E' SEPTAL VELOCITY: 4.57 CM/S
ECHO LV EDV A2C: 36 ML
ECHO LV EDV A4C: 34 ML
ECHO LV EDV INDEX A4C: 19 ML/M2
ECHO LV EDV NDEX A2C: 20 ML/M2
ECHO LV EJECTION FRACTION A2C: 67 %
ECHO LV EJECTION FRACTION A4C: 60 %
ECHO LV EJECTION FRACTION BIPLANE: 65 % (ref 55–100)
ECHO LV ESV A2C: 12 ML
ECHO LV ESV A4C: 14 ML
ECHO LV ESV INDEX A2C: 7 ML/M2
ECHO LV ESV INDEX A4C: 8 ML/M2
ECHO LV FRACTIONAL SHORTENING: 29 % (ref 28–44)
ECHO LV INTERNAL DIMENSION DIASTOLE INDEX: 2.36 CM/M2
ECHO LV INTERNAL DIMENSION DIASTOLIC: 4.2 CM (ref 3.9–5.3)
ECHO LV INTERNAL DIMENSION SYSTOLIC INDEX: 1.69 CM/M2
ECHO LV INTERNAL DIMENSION SYSTOLIC: 3 CM
ECHO LV IVSD: 1.3 CM (ref 0.6–0.9)
ECHO LV MASS 2D: 189.2 G (ref 67–162)
ECHO LV MASS INDEX 2D: 106.3 G/M2 (ref 43–95)
ECHO LV POSTERIOR WALL DIASTOLIC: 1.2 CM (ref 0.6–0.9)
ECHO LV RELATIVE WALL THICKNESS RATIO: 0.57
ECHO LVOT AREA: 2.5 CM2
ECHO LVOT AV VTI INDEX: 0.35
ECHO LVOT DIAM: 1.8 CM
ECHO LVOT MEAN GRADIENT: 1 MMHG
ECHO LVOT PEAK GRADIENT: 2 MMHG
ECHO LVOT PEAK VELOCITY: 0.7 M/S
ECHO LVOT STROKE VOLUME INDEX: 15 ML/M2
ECHO LVOT SV: 26.7 ML
ECHO LVOT VTI: 10.5 CM
ECHO MV A VELOCITY: 0.6 M/S
ECHO MV AREA VTI: 1.1 CM2
ECHO MV E DECELERATION TIME (DT): 215 MS
ECHO MV E VELOCITY: 0.53 M/S
ECHO MV E/A RATIO: 0.88
ECHO MV E/E' LATERAL: 10.6
ECHO MV E/E' RATIO (AVERAGED): 11.1
ECHO MV E/E' SEPTAL: 11.6
ECHO MV LVOT VTI INDEX: 2.4
ECHO MV MAX VELOCITY: 0.9 M/S
ECHO MV MEAN GRADIENT: 1 MMHG
ECHO MV MEAN VELOCITY: 0.5 M/S
ECHO MV PEAK GRADIENT: 3 MMHG
ECHO MV VTI: 25.2 CM
ECHO RIGHT VENTRICULAR SYSTOLIC PRESSURE (RVSP): 32 MMHG
ECHO RV BASAL DIMENSION: 3 CM
ECHO RV FREE WALL PEAK S': 10.2 CM/S
ECHO RV TAPSE: 1.9 CM (ref 1.7–?)
ECHO TV REGURGITANT MAX VELOCITY: 2.71 M/S
ECHO TV REGURGITANT PEAK GRADIENT: 29 MMHG
ERYTHROCYTE [DISTWIDTH] IN BLOOD BY AUTOMATED COUNT: 16.4 % (ref 11.8–14.4)
GFR, ESTIMATED: 70 ML/MIN/1.73M2
GLUCOSE SERPL-MCNC: 106 MG/DL (ref 74–99)
HCT VFR BLD AUTO: 39.7 % (ref 36.3–47.1)
HGB BLD-MCNC: 12.2 G/DL (ref 11.9–15.1)
MCH RBC QN AUTO: 29.8 PG (ref 25.2–33.5)
MCHC RBC AUTO-ENTMCNC: 30.7 G/DL (ref 28.4–34.8)
MCV RBC AUTO: 97.1 FL (ref 82.6–102.9)
NRBC BLD-RTO: 0 PER 100 WBC
PLATELET # BLD AUTO: 304 K/UL (ref 138–453)
PMV BLD AUTO: 9.4 FL (ref 8.1–13.5)
POTASSIUM SERPL-SCNC: 3.9 MMOL/L (ref 3.7–5.3)
RBC # BLD AUTO: 4.09 M/UL (ref 3.95–5.11)
SODIUM SERPL-SCNC: 142 MMOL/L (ref 136–145)
WBC OTHER # BLD: 10.4 K/UL (ref 3.5–11.3)

## 2025-01-09 PROCEDURE — 36224 PLACE CATH CAROTD ART: CPT

## 2025-01-09 PROCEDURE — 6360000004 HC RX CONTRAST MEDICATION: Performed by: PSYCHIATRY & NEUROLOGY

## 2025-01-09 PROCEDURE — B41F1ZZ FLUOROSCOPY OF RIGHT LOWER EXTREMITY ARTERIES USING LOW OSMOLAR CONTRAST: ICD-10-PCS | Performed by: PSYCHIATRY & NEUROLOGY

## 2025-01-09 PROCEDURE — 6360000002 HC RX W HCPCS: Performed by: STUDENT IN AN ORGANIZED HEALTH CARE EDUCATION/TRAINING PROGRAM

## 2025-01-09 PROCEDURE — B3151ZZ FLUOROSCOPY OF BILATERAL COMMON CAROTID ARTERIES USING LOW OSMOLAR CONTRAST: ICD-10-PCS | Performed by: PSYCHIATRY & NEUROLOGY

## 2025-01-09 PROCEDURE — 36225 PLACE CATH SUBCLAVIAN ART: CPT

## 2025-01-09 PROCEDURE — 76937 US GUIDE VASCULAR ACCESS: CPT

## 2025-01-09 PROCEDURE — 85027 COMPLETE CBC AUTOMATED: CPT

## 2025-01-09 PROCEDURE — 93306 TTE W/DOPPLER COMPLETE: CPT | Performed by: INTERNAL MEDICINE

## 2025-01-09 PROCEDURE — 70498 CT ANGIOGRAPHY NECK: CPT

## 2025-01-09 PROCEDURE — 80048 BASIC METABOLIC PNL TOTAL CA: CPT

## 2025-01-09 PROCEDURE — 99222 1ST HOSP IP/OBS MODERATE 55: CPT | Performed by: PHYSICAL MEDICINE & REHABILITATION

## 2025-01-09 PROCEDURE — 6370000000 HC RX 637 (ALT 250 FOR IP): Performed by: PSYCHIATRY & NEUROLOGY

## 2025-01-09 PROCEDURE — C1894 INTRO/SHEATH, NON-LASER: HCPCS

## 2025-01-09 PROCEDURE — 6360000002 HC RX W HCPCS

## 2025-01-09 PROCEDURE — 6370000000 HC RX 637 (ALT 250 FOR IP)

## 2025-01-09 PROCEDURE — 2060000000 HC ICU INTERMEDIATE R&B

## 2025-01-09 PROCEDURE — 36415 COLL VENOUS BLD VENIPUNCTURE: CPT

## 2025-01-09 PROCEDURE — 6370000000 HC RX 637 (ALT 250 FOR IP): Performed by: STUDENT IN AN ORGANIZED HEALTH CARE EDUCATION/TRAINING PROGRAM

## 2025-01-09 PROCEDURE — 76377 3D RENDER W/INTRP POSTPROCES: CPT

## 2025-01-09 PROCEDURE — 99153 MOD SED SAME PHYS/QHP EA: CPT

## 2025-01-09 PROCEDURE — B31D1ZZ FLUOROSCOPY OF RIGHT VERTEBRAL ARTERY USING LOW OSMOLAR CONTRAST: ICD-10-PCS | Performed by: PSYCHIATRY & NEUROLOGY

## 2025-01-09 PROCEDURE — 99233 SBSQ HOSP IP/OBS HIGH 50: CPT | Performed by: PSYCHIATRY & NEUROLOGY

## 2025-01-09 PROCEDURE — 99222 1ST HOSP IP/OBS MODERATE 55: CPT | Performed by: INTERNAL MEDICINE

## 2025-01-09 PROCEDURE — 2500000003 HC RX 250 WO HCPCS

## 2025-01-09 PROCEDURE — B3181ZZ FLUOROSCOPY OF BILATERAL INTERNAL CAROTID ARTERIES USING LOW OSMOLAR CONTRAST: ICD-10-PCS | Performed by: PSYCHIATRY & NEUROLOGY

## 2025-01-09 PROCEDURE — 99152 MOD SED SAME PHYS/QHP 5/>YRS: CPT

## 2025-01-09 PROCEDURE — 93306 TTE W/DOPPLER COMPLETE: CPT

## 2025-01-09 RX ORDER — HEPARIN SODIUM 1000 [USP'U]/ML
INJECTION, SOLUTION INTRAVENOUS; SUBCUTANEOUS PRN
Status: COMPLETED | OUTPATIENT
Start: 2025-01-09 | End: 2025-01-09

## 2025-01-09 RX ORDER — SODIUM CHLORIDE 0.9 % (FLUSH) 0.9 %
5-40 SYRINGE (ML) INJECTION PRN
Status: DISCONTINUED | OUTPATIENT
Start: 2025-01-09 | End: 2025-01-13

## 2025-01-09 RX ORDER — FENTANYL CITRATE 50 UG/ML
INJECTION, SOLUTION INTRAMUSCULAR; INTRAVENOUS PRN
Status: COMPLETED | OUTPATIENT
Start: 2025-01-09 | End: 2025-01-09

## 2025-01-09 RX ORDER — CLINDAMYCIN IN PERCENT DEXTROSE 6 MG/ML
INJECTION, SOLUTION INTRAVENOUS CONTINUOUS PRN
Status: COMPLETED | OUTPATIENT
Start: 2025-01-09 | End: 2025-01-09

## 2025-01-09 RX ORDER — IOPAMIDOL 755 MG/ML
90 INJECTION, SOLUTION INTRAVASCULAR
Status: COMPLETED | OUTPATIENT
Start: 2025-01-09 | End: 2025-01-09

## 2025-01-09 RX ORDER — LISINOPRIL 20 MG/1
10 TABLET ORAL NIGHTLY
Status: DISCONTINUED | OUTPATIENT
Start: 2025-01-09 | End: 2025-01-09

## 2025-01-09 RX ORDER — SODIUM CHLORIDE 9 MG/ML
INJECTION, SOLUTION INTRAVENOUS PRN
Status: DISCONTINUED | OUTPATIENT
Start: 2025-01-09 | End: 2025-01-17 | Stop reason: HOSPADM

## 2025-01-09 RX ORDER — ATORVASTATIN CALCIUM 40 MG/1
80 TABLET, FILM COATED ORAL NIGHTLY
Status: DISCONTINUED | OUTPATIENT
Start: 2025-01-09 | End: 2025-01-17 | Stop reason: HOSPADM

## 2025-01-09 RX ORDER — ONDANSETRON 4 MG/1
4 TABLET, ORALLY DISINTEGRATING ORAL EVERY 8 HOURS PRN
Status: DISCONTINUED | OUTPATIENT
Start: 2025-01-09 | End: 2025-01-17 | Stop reason: HOSPADM

## 2025-01-09 RX ORDER — LISINOPRIL 20 MG/1
10 TABLET ORAL DAILY
Status: DISCONTINUED | OUTPATIENT
Start: 2025-01-09 | End: 2025-01-10

## 2025-01-09 RX ORDER — ONDANSETRON 2 MG/ML
4 INJECTION INTRAMUSCULAR; INTRAVENOUS EVERY 6 HOURS PRN
Status: DISCONTINUED | OUTPATIENT
Start: 2025-01-09 | End: 2025-01-17 | Stop reason: HOSPADM

## 2025-01-09 RX ORDER — LORAZEPAM 1 MG/1
1 TABLET ORAL ONCE
Status: COMPLETED | OUTPATIENT
Start: 2025-01-09 | End: 2025-01-09

## 2025-01-09 RX ORDER — SODIUM CHLORIDE 0.9 % (FLUSH) 0.9 %
5-40 SYRINGE (ML) INJECTION EVERY 12 HOURS SCHEDULED
Status: DISCONTINUED | OUTPATIENT
Start: 2025-01-09 | End: 2025-01-14

## 2025-01-09 RX ORDER — MIDAZOLAM HYDROCHLORIDE 2 MG/2ML
INJECTION, SOLUTION INTRAMUSCULAR; INTRAVENOUS PRN
Status: COMPLETED | OUTPATIENT
Start: 2025-01-09 | End: 2025-01-09

## 2025-01-09 RX ADMIN — SODIUM CHLORIDE, PRESERVATIVE FREE 10 ML: 5 INJECTION INTRAVENOUS at 21:21

## 2025-01-09 RX ADMIN — CLINDAMYCIN IN 5 PERCENT DEXTROSE 600 MG: 6 INJECTION, SOLUTION INTRAVENOUS at 15:39

## 2025-01-09 RX ADMIN — FENTANYL CITRATE 25 MCG: 50 INJECTION, SOLUTION INTRAMUSCULAR; INTRAVENOUS at 16:17

## 2025-01-09 RX ADMIN — HEPARIN SODIUM 2000 UNITS: 1000 INJECTION, SOLUTION INTRAVENOUS; SUBCUTANEOUS at 15:40

## 2025-01-09 RX ADMIN — IOPAMIDOL 90 ML: 755 INJECTION, SOLUTION INTRAVENOUS at 09:17

## 2025-01-09 RX ADMIN — SODIUM CHLORIDE, PRESERVATIVE FREE 5 ML: 5 INJECTION INTRAVENOUS at 10:21

## 2025-01-09 RX ADMIN — LEVOTHYROXINE SODIUM 50 MCG: 0.05 TABLET ORAL at 06:50

## 2025-01-09 RX ADMIN — MIDAZOLAM HYDROCHLORIDE 0.5 MG: 1 INJECTION, SOLUTION INTRAMUSCULAR; INTRAVENOUS at 16:24

## 2025-01-09 RX ADMIN — LORAZEPAM 1 MG: 1 TABLET ORAL at 18:29

## 2025-01-09 RX ADMIN — FENTANYL CITRATE 50 MCG: 50 INJECTION, SOLUTION INTRAMUSCULAR; INTRAVENOUS at 15:34

## 2025-01-09 RX ADMIN — ENOXAPARIN SODIUM 40 MG: 100 INJECTION SUBCUTANEOUS at 10:21

## 2025-01-09 RX ADMIN — Medication 10 MG: at 18:43

## 2025-01-09 RX ADMIN — MIDAZOLAM HYDROCHLORIDE 1 MG: 1 INJECTION, SOLUTION INTRAMUSCULAR; INTRAVENOUS at 15:34

## 2025-01-09 RX ADMIN — AMLODIPINE BESYLATE 10 MG: 10 TABLET ORAL at 10:21

## 2025-01-09 RX ADMIN — ATORVASTATIN CALCIUM 80 MG: 80 TABLET, FILM COATED ORAL at 21:21

## 2025-01-09 NOTE — CONSULTS
[facility]     Department of Internal Medicine - Staff Internal Medicine Teaching Service          ADMISSION NOTE/HISTORY AND PHYSICAL EXAMINATION   Date: 1/8/2025  Patient Name: Lanny Jaimes  Date of admission: 1/7/2025  9:21 AM  YOB: 1955  PCP: No primary care provider on file.  History Obtained From:  patient, electronic medical record    Consult Reason:     Consult Reason: Blood pressure management     HISTORY OF PRESENTING ILLNESS     The patient is a pleasant 69 y.o. female presents with a chief complaint of altered mentation after a fall.  Patient was attempting to get out of bed and she fell down but denied hitting her head or losing consciousness.  She was unable to get up and was found by neighbors who called EMS.  [facility] to [facility] confused and CT head was suspicious for a small left internal hemorrhage.  She was transferred to [facility] for trauma evaluation.  Trauma evaluation and scans were unremarkable except the small ICH.  She was transferred to neuro-critical care and transition to neurology as she was remaining stable.       MRI brain reported numerous chronic microhemorrhages including one corresponding to the left parietal lobe hypodense lesions on CT scan.  It did not report acute hemorrhage. Neurosurgery/neurology impression is cerebral amyloid angiopathy.     Patient known to have history of hypertension and hypothyroidism and she was not compliant with her medications for years.    Medicine was consulted for management of blood pressure with goal of below 140 SBP.  On my assessment, patient is oriented X 4 but does have delayed response, frequent forgetfulness and confusing statements at times.  Family has reported cognitive decline recently.  Blood pressure was high initially in the 180s and later on .      Patient denied having any symptoms.  She specifically denied headache, visual/hearing  the pelvis.     CT CSpine W/O Contrast    Result Date: 1/7/2025  No acute abnormality of the cervical spine.     XR KNEE RIGHT (3 VIEWS)    Result Date: 1/7/2025  Tricompartmental osteoarthritis of the right knee. No acute finding.     XR KNEE LEFT (3 VIEWS)    Result Date: 1/7/2025  No acute fracture or dislocation at the left knee. Osteoarthritis the patellofemoral compartment and medial compartment.     XR CHEST PORTABLE    Result Date: 1/7/2025  Skin fold over the right and left chest.  No pneumonia, CHF, or true pneumothorax.     CT Head W/O Contrast    Result Date: 1/7/2025  1. Possible small hemorrhagic contusion at the lateral aspect of the left parietal lobe. 2. Chronic microvascular ischemic changes.       ASSESSMENT & PLAN     ASSESSMENT / PLAN:     Thank you for allowing us to see Lanny Jaimes in consultation for management of her blood pressure.     Principal Problem:  Left parietal lobe hyperdensity in the setting of CAA:  MRI reports multiple chronic micro-hemorrhages - no acute hemorrhages   Under neurology - plans OP follow up for cognitive decline   Continue neuro checks     Hypertension:  Non-compliant with treatment  Initially started on norvasc 5 mg - BP remained high in 180s/190s - norvasc increased to 10 mg and lisinopril 10 mg added   Monitor and titrate medications as appropriate  PRN labetolol and hydralazine    Hypothyroidism - Acquired - s/p radiation for hyperthyroidism:   Non-compliant with treatment   TSH 37 and fT4 < 0.1   Continue synthroid 50 mcg   Repeat PCP TSH in 4-6 weeks with PCP      DVT: Lovenox   GI Prophylaxis: Not indicated     Ritchie Hodge MD  Internal Medicine Resident, PGY-1  [facility]; [place], [place]  1/8/2025, 7:04 PM

## 2025-01-09 NOTE — PROGRESS NOTES
[facility]  Internal Medicine Teaching Residency Program  Inpatient Daily Progress Note  ______________________________________________________________________________    Patient: Lanny Jaimes  YOB: 1955   MRN:[id number]    Acct: [id number]     Room: 0115/0115-01  Admit date: 1/7/2025  Today's date: 01/09/25  Number of days in the hospital: 2    SUBJECTIVE   Admitting Diagnosis: Acute intracranial hemorrhage (HCC)  CC: Fall and AMS - IM consulted for management of hypertension    Pt examined at bedside. Chart & results reviewed.     Vitals stable - BP within normal range - latest 129/73   Patient feeling better - no active symptoms or concerns         ROS:  Constitutional:  negative for chills, fevers, sweats  Respiratory:  negative for cough, dyspnea on exertion, hemoptysis, shortness of breath, wheezing  Cardiovascular:  negative for chest pain, chest pressure/discomfort, lower extremity edema, palpitations  Gastrointestinal:  negative for abdominal pain, constipation, diarrhea, nausea, vomiting  Neurological:  negative for dizziness, headache  BRIEF HISTORY     The patient is a pleasant 69 y.o. female presents with a chief complaint of altered mentation after a fall.  Patient was attempting to get out of bed and she fell down but denied hitting her head or losing consciousness.  She was unable to get up and was found by neighbors who called EMS.  She presented to [facility] facility where she was found to be confused and CT head was suspicious for a small left internal hemorrhage.  She was transferred to [facility] for trauma evaluation.  Trauma evaluation and scans were unremarkable except the small ICH.  She was transferred to neuro-critical care and transition to neurology as she was remaining stable.       MRI brain reported numerous chronic microhemorrhages including one corresponding to the left parietal lobe hypodense lesions on CT scan.  It did not

## 2025-01-09 NOTE — CARE COORDINATION
Transitional Planning:   Per Dr. Garcia note : PT/OT to re eval pt. PT last note states pt went 250'. Per RN Pt max 1 assist for bed mobility and getting up.   Call to PT and spoke landen Cordero and asked pt to be re evaluated asap- states she will add to list for 1st thing tomorrow .   Called OT and LM that pt needs seen and evaluated for placement   Pt currently in IR for DSA w possible coil placement

## 2025-01-09 NOTE — PROGRESS NOTES
[facility]  Speech Language Pathology    Date: 1/9/2025  Patient Name: Lanny Jaimes  YOB: 1955   AGE: 69 y.o.  MRN: [id number]        Patient Not Available for Speech Therapy     Due to:  [] Testing  [] Hemodialysis  [] Cancelled by RN  [] Surgery   [] Intubation/Sedation/Pain Medication  [] Medical instability  [x] Other: pt off the floor    Next scheduled treatment: 1/10/25  Completed by: Krysta Shelton, SLP, M.S. CCC-SLP

## 2025-01-09 NOTE — FLOWSHEET NOTE
Pt transported back to neuro ICU. VSS. Neuro exam unchanged. Puncture site is clean, dry and intact with Femstop on top. Pedal pulses palpable and marked.

## 2025-01-09 NOTE — PROGRESS NOTES
[facility] Neurology   IN-PATIENT SERVICE   [facility]    Progress Note             Date:   1/9/2025  Patient name:  Lanny Jaimes  Date of admission:  1/7/2025  9:21 AM  MRN:   [id number]  Account:  [id number]  YOB: 1955  PCP:    No primary care provider on file.  Room:   [address] [place] [address]  Code Status:    Full Code    Chief Complaint:     Chief Complaint   Patient presents with    Fall     Frequent falls     Altered Mental Status       Interval hx:     The patient was seen and examined at bedside.   Is vitally stable, alert and oriented x 3. No acute events overnight.  CTA head and neck pending.  NEV on board, picture suggestive of hypertensive angiopathy.  IM on board, increased amlodipine to 10 Mg and added lisinopril 10 mg.  CTA head and neck showed left ICA cavernous aneurysm.  NEV informed, planning for DSA today.      Brief History of Present Illness:     69-year-old female presented as a transfer from outside facility as CT head read as small left parietal hemorrhagic contusion after a fall at home.  There were initial concerns for traumatic hemorrhage per notes however unusual appearance on imaging for a traumatic injury. MRI more consistent with CAA.  Initial SBP 220s.  ICH score 0.  Was evaluated by trauma, no acute traumatic injuries and neurocritical care was consulted for admission to neuro ICU.  Patient son at bedside endorsed history of uncontrolled hypertension with noncompliance with hypertension medication for over a year.  Additionally endorses noncompliance with other medications including Synthroid.  Patient underwent thyroid radiation in 2000 for hyperthyroidism resulting in iatrogenic hypothyroidism and started on Synthroid.  Per son, patient's mentation is at her baseline stating \"she is always been like this.\"  However he does report her family reports a progressive worsening of cognitive decline and frequent falls over the past year.  On initial evaluation,    Result Value Ref Range    WBC 10.4 3.5 - 11.3 k/uL    RBC 4.09 3.95 - 5.11 m/uL    Hemoglobin 12.2 11.9 - 15.1 g/dL    Hematocrit 39.7 36.3 - 47.1 %    MCV 97.1 82.6 - 102.9 fL    MCH 29.8 25.2 - 33.5 pg    MCHC 30.7 28.4 - 34.8 g/dL    RDW 16.4 (H) 11.8 - 14.4 %    Platelets 304 138 - 453 k/uL    MPV 9.4 8.1 - 13.5 fL    NRBC Automated 0.0 0.0 per 100 WBC     Recent Labs     01/09/25  0327   WBC 10.4   RBC 4.09   HGB 12.2   HCT 39.7   MCV 97.1   MCH 29.8   MCHC 30.7   RDW 16.4*      MPV 9.4     Recent Labs     01/07/25  0404 01/08/25  0455 01/09/25  0327      < > 142   K 4.2   < > 3.9      < > 104   CO2 27   < > 26   BUN 16   < > 23   CREATININE 0.8   < > 0.9   GLUCOSE 166*   < > 106*   CALCIUM 9.6   < > 9.0   BILITOT 0.4  --   --    ALKPHOS 97  --   --    AST 25  --   --    ALT 17  --   --     < > = values in this interval not displayed.     Hemoglobin A1C   Date Value Ref Range Status   01/08/2025 6.0 4.0 - 6.0 % Final       Assessment :      Primary Problem  Acute intracranial hemorrhage (HCC)    Active Hospital Problems    Diagnosis Date Noted    Cerebral amyloid angiopathy (HCC) [E85.4, I68.0] 01/08/2025    Acquired hypothyroidism [E03.9] 01/08/2025    Primary hypertension [I10] 01/08/2025    Focal hemorrhagic contusion of cerebrum [S06.33AA] 01/07/2025    Acute intracranial hemorrhage (HCC) [I62.9] 01/07/2025           Plan:     L Parietal lobe small hyperdensity in the setting of Hypertensive microangiopathy  Left ICA cavernous aneurysm  - Keep SBP <140  - Repeat Head CT stable  -CTA head and neck showed left ICA cavernous aneurysm  - NSG was consulted per protocol, no interventions   - Avoid AC/AP, OK for DVT ppx with Lovenox  - Lipitor 20 mg  -Continue amlodipine 10 Mg, lisinopril 10 Mg daily  -NEV on board: Planning for DSA today afternoon  - Neuro checks per protocol       HTN  -SBP goal less than 140  - noncompliant with home HTN meds for over a year   - prn labetalol and

## 2025-01-09 NOTE — PLAN OF CARE
Problem: Discharge Planning  Goal: Discharge to home or other facility with appropriate resources  Outcome: Progressing     Problem: Neurosensory - Adult  Goal: Achieves stable or improved neurological status  Outcome: Progressing  Goal: Absence of seizures  Outcome: Progressing

## 2025-01-09 NOTE — SEDATION DOCUMENTATION
Physician continues to hold pressure. Pt getting anxious, c/o having to urinate.   Report called to kelechi ALMANZAR. All questions answered at this time

## 2025-01-09 NOTE — CONSULTS
Endovascular Neurosurgery Consult      Reason for evaluation: Small left parietal hyperdensity on CT head, diffuse microhemorrhages on GRE MRI     SUBJECTIVE:   History of Chief Complaint:      69 year old male with a history of hypertension, hyperlipidemia, hypothyroidism noncompliance with medications presenting with altered mentation that resolved, found to have small left parietal possible ICH-hypertensive CT head.  MRI brain showed underlying hypertensive angiopathy.  Patient has multiple microhemorrhages.  Patient currently back to baseline with strict blood pressure. CTA showed . 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 2 .      Allergies  is allergic to pcn [penicillins].  Medications  Prior to Admission medications    Medication Sig Start Date End Date Taking? Authorizing Provider   levothyroxine (SYNTHROID) 88 MCG tablet Take 1 tablet by mouth Daily    Provider, MD Mohinder    Scheduled Meds:   lisinopril  10 mg Oral Nightly    atorvastatin  20 mg Oral Nightly    levothyroxine  50 mcg Oral Daily    enoxaparin  40 mg SubCUTAneous Daily    amLODIPine  10 mg Oral Daily    sodium chloride flush  5-40 mL IntraVENous 2 times per day     Continuous Infusions:   sodium chloride       PRN Meds:.bacitracin, sodium chloride flush, sodium chloride, acetaminophen **OR** acetaminophen, ondansetron **OR** ondansetron, labetalol, hydrALAZINE  Past Medical History   has a past medical history of Thyroid disease.  Past Surgical History   has no past surgical history on file.  Social History   reports that she has never smoked. She does not have any smokeless tobacco history on file.   reports that she does not currently use alcohol.   reports no history of drug use.  Family History  family history is not on file.    Review of Systems:  CONSTITUTIONAL:  negative for fevers, chills, fatigue and malaise    EYES:  negative for double vision, blurred vision and

## 2025-01-09 NOTE — CONSULTS
Physical Medicine & Rehabilitation  Consult Note      Admitting Physician:   Antwan Carranza MD    Primary Care Provider:   No primary care provider on file.     Reason for Consult:  Acute Inpatient Rehabilitation    Chief Complaint: Fall    History of Present Illness:  Referring Provider is requesting an evaluation for appropriate placement upon discharge from acute care. History from chart review and patient, staff.    Lanny Jaimes is a 69 y.o. RHD female admitted to [facility] on 1/7/2025.      Patient admitted from [facility] after fall from standing height. Small hemorrhagic parietal lobe contusion found and he was transferred to [facility] for trauma and neurosurgery care.     Neuro ICU: MRI showing microhemorrhage consistent with possible cerebral amyloid angiopathy. Small L parietal IPH with possible hemorrhagic contusion vs CAA.     Neurosurgery: confirmed multiple foci of microhemorrhage and diffusion restriction with small enhancing lesion R parietal lobe. No indication for surgical intervention. Recommending repeat MRI in a few months and signed off.     Neuro endovascular: recommending strict hypertension management with SBP < 140mm Hg for hypertensive angiopathy. No current indication for antiplatelets. Having altered mentation/encephalopathy. For DSA today.     Patient continues to have some impaired mentation, unclear if this is baseline.     Review of Systems:  Constitutional: negative for anorexia, chills, fatigue, fevers, sweats and weight loss  Eyes: negative for redness and visual disturbance  Ears, nose, mouth, throat, and face: negative for earaches, sore throat and tinnitus  Respiratory: negative for cough and shortness of breath  Cardiovascular: negative for chest pain, dyspnea and palpitations  Gastrointestinal: negative for abdominal pain, change in bowel habits, constipation, nausea and vomiting  Genitourinary:negative for dysuria, frequency, hesitancy and urinary  incontinence  Integument/breast: negative for pruritus and rash  Musculoskeletal:negative for stiff joints  Neurological: negative for dizziness, headaches   Behavioral/Psych: negative for decreased appetite, depression        Premorbid function:  Independent    Current function:  Restrictions/ Precautions-  Restrictions/Precautions  Activity Level: Up as Tolerated  Required Braces or Orthoses?: No    PT:    Bed Mobility-  Bed mobility  Supine to Sit: Stand by assistance  Sit to Supine: Stand by assistance  Scooting: Stand by assistance  Bed Mobility Comments: HOB elevated ~ 30 degrees    Transfers-  Transfers  Sit to Stand: Contact guard assistance  Stand to Sit: Contact guard assistance  Comment: Transfers performed with a RW.     Ambulation-  Ambulation  Surface: Level tile  Device: Rolling Walker  Assistance: Contact guard assistance  Quality of Gait: Mild instability, crosses midline x3, narrow SARAH, no true LOB.  Gait Deviations: Decreased step length  Distance: 250 feet  More Ambulation?: No        OT:   ADLS-  ADL  Feeding: Independent  Grooming: Independent  Grooming Skilled Clinical Factors: Pt stood sinskide and completed combing hair independently  UE Bathing: Independent  LE Bathing: Contact guard assistance, Increased time to complete  UE Dressing: Independent  LE Dressing: Contact guard assistance, Increased time to complete  LE Dressing Skilled Clinical Factors: Donned socks seated EOB at SBA with verbal cues to maintain attention to task  Toileting: Contact guard assistance, Increased time to complete  Functional Mobility: Contact guard assistance, Adaptive equipment  Functional Mobility Skilled Clinical Factors: Pt completed functional mobility using RW for support to complete mobility within room and within hallway; verbal cues for appropriate placement of RW and awareness of LE positioning during mobility;     SLP:  Pt presents with moderate cognitive deficits characterized by difficulties with

## 2025-01-09 NOTE — BRIEF OP NOTE
[facility]    NEUROENDOVASCULAR SERVICE: POST-OP NOTE: 1/9/2025    Pt Name: Lanny Jaimes  MRN: [id number]  YOB: 1955  Date of Procedure: 1/9/2025  Primary Care Physician: No primary care provider on file.        Pre-Procedural Diagnosis: Left cavernous segment aneurysm  Post-Procedural Diagnosis: Same      Procedure Performed:Diagnostic Cerebral Angiogram    Surgeon:   Maged Giles MD    Fellow:  Analia Evans MD and Kishan Cárdenas MD PhD     Assisting Tech:  Erica Su    PRE-PROCEDURAL EXAM:  Neurological exam performed and unchanged from initial H&P or consult      Anesthesia: IV Moderate Sedation  An Immediate re-assessment was completed prior to sedation, and it is determined to be safe to proceed.  Complications: none    Intra-Operative EXAM:  Patient sedated with unchanged limited neurological exam    EBL: < Minimal      Cc            Specimens: Were not Obtained  Contrast:     Visipaque 270 low osmolar 120 Cc             Fluoro: 32.7 min    Findings:  Please see dictated Radiology note for further details  Dolichoectatic vertebrobasilar artery.  Dominant right vertebral artery  Bilateral severely tortuous ICA  Right P-comm infundibulum  Left cavernous medially pointing saccular aneurysm measuring 10.73 mm W x 9.92 mm L x 9.64 mm H x 7 mm N with a parent vessel diameter measuring around 5 mm            POST-PROCEDURAL EXAM :   Stable neurological Exam  Neurological exam performed and unchanged from initial H&P or consult    Closure:  right Vascade 5   F        POST-PROCEDURAL MONITORING : see orders  Disposition: Neuro ICU      Recommendations:  Back to Neuro ICU  Do not bend right leg for 3 hours.  Groin checks per protocol.  Peripheral pulse checks per protocol.  SBP goal normal  I will discuss treatment options with her son and daughter tomorrow morning and plan for an outpatient endovascular embolization within the next couple of months.  Follow up with

## 2025-01-10 LAB
ANION GAP SERPL CALCULATED.3IONS-SCNC: 11 MMOL/L (ref 9–16)
BUN SERPL-MCNC: 22 MG/DL (ref 8–23)
CALCIUM SERPL-MCNC: 8.8 MG/DL (ref 8.6–10.4)
CHLORIDE SERPL-SCNC: 106 MMOL/L (ref 98–107)
CO2 SERPL-SCNC: 25 MMOL/L (ref 20–31)
CREAT SERPL-MCNC: 0.8 MG/DL (ref 0.6–0.9)
ERYTHROCYTE [DISTWIDTH] IN BLOOD BY AUTOMATED COUNT: 16.2 % (ref 11.8–14.4)
GFR, ESTIMATED: 80 ML/MIN/1.73M2
GLUCOSE SERPL-MCNC: 93 MG/DL (ref 74–99)
HCT VFR BLD AUTO: 39.8 % (ref 36.3–47.1)
HGB BLD-MCNC: 12.9 G/DL (ref 11.9–15.1)
MCH RBC QN AUTO: 30.1 PG (ref 25.2–33.5)
MCHC RBC AUTO-ENTMCNC: 32.4 G/DL (ref 28.4–34.8)
MCV RBC AUTO: 92.8 FL (ref 82.6–102.9)
NRBC BLD-RTO: 0 PER 100 WBC
PLATELET # BLD AUTO: 281 K/UL (ref 138–453)
PMV BLD AUTO: 9.8 FL (ref 8.1–13.5)
POTASSIUM SERPL-SCNC: 3.7 MMOL/L (ref 3.7–5.3)
RBC # BLD AUTO: 4.29 M/UL (ref 3.95–5.11)
SODIUM SERPL-SCNC: 142 MMOL/L (ref 136–145)
WBC OTHER # BLD: 9.7 K/UL (ref 3.5–11.3)

## 2025-01-10 PROCEDURE — 97110 THERAPEUTIC EXERCISES: CPT

## 2025-01-10 PROCEDURE — 6370000000 HC RX 637 (ALT 250 FOR IP)

## 2025-01-10 PROCEDURE — 99232 SBSQ HOSP IP/OBS MODERATE 35: CPT | Performed by: INTERNAL MEDICINE

## 2025-01-10 PROCEDURE — 97535 SELF CARE MNGMENT TRAINING: CPT

## 2025-01-10 PROCEDURE — 2500000003 HC RX 250 WO HCPCS: Performed by: STUDENT IN AN ORGANIZED HEALTH CARE EDUCATION/TRAINING PROGRAM

## 2025-01-10 PROCEDURE — 85027 COMPLETE CBC AUTOMATED: CPT

## 2025-01-10 PROCEDURE — 82947 ASSAY GLUCOSE BLOOD QUANT: CPT

## 2025-01-10 PROCEDURE — 6360000002 HC RX W HCPCS

## 2025-01-10 PROCEDURE — 2060000000 HC ICU INTERMEDIATE R&B

## 2025-01-10 PROCEDURE — 80048 BASIC METABOLIC PNL TOTAL CA: CPT

## 2025-01-10 PROCEDURE — 97129 THER IVNTJ 1ST 15 MIN: CPT

## 2025-01-10 PROCEDURE — 97530 THERAPEUTIC ACTIVITIES: CPT

## 2025-01-10 PROCEDURE — 97130 THER IVNTJ EA ADDL 15 MIN: CPT

## 2025-01-10 PROCEDURE — 99232 SBSQ HOSP IP/OBS MODERATE 35: CPT | Performed by: PHYSICAL MEDICINE & REHABILITATION

## 2025-01-10 PROCEDURE — 36415 COLL VENOUS BLD VENIPUNCTURE: CPT

## 2025-01-10 PROCEDURE — 97116 GAIT TRAINING THERAPY: CPT

## 2025-01-10 PROCEDURE — 51798 US URINE CAPACITY MEASURE: CPT

## 2025-01-10 PROCEDURE — 99232 SBSQ HOSP IP/OBS MODERATE 35: CPT | Performed by: PSYCHIATRY & NEUROLOGY

## 2025-01-10 PROCEDURE — 6370000000 HC RX 637 (ALT 250 FOR IP): Performed by: PSYCHIATRY & NEUROLOGY

## 2025-01-10 PROCEDURE — 2580000003 HC RX 258

## 2025-01-10 RX ORDER — LISINOPRIL 10 MG/1
10 TABLET ORAL ONCE
Status: DISCONTINUED | OUTPATIENT
Start: 2025-01-10 | End: 2025-01-13

## 2025-01-10 RX ORDER — LISINOPRIL 20 MG/1
20 TABLET ORAL DAILY
Status: DISCONTINUED | OUTPATIENT
Start: 2025-01-11 | End: 2025-01-13

## 2025-01-10 RX ORDER — LISINOPRIL 20 MG/1
10 TABLET ORAL DAILY
Status: DISCONTINUED | OUTPATIENT
Start: 2025-01-11 | End: 2025-01-10

## 2025-01-10 RX ORDER — 0.9 % SODIUM CHLORIDE 0.9 %
500 INTRAVENOUS SOLUTION INTRAVENOUS ONCE
Status: COMPLETED | OUTPATIENT
Start: 2025-01-10 | End: 2025-01-10

## 2025-01-10 RX ORDER — ASPIRIN 81 MG/1
81 TABLET ORAL DAILY
Status: DISCONTINUED | OUTPATIENT
Start: 2025-01-10 | End: 2025-01-17 | Stop reason: HOSPADM

## 2025-01-10 RX ORDER — LISINOPRIL 20 MG/1
20 TABLET ORAL DAILY
Status: DISCONTINUED | OUTPATIENT
Start: 2025-01-10 | End: 2025-01-10

## 2025-01-10 RX ADMIN — LEVOTHYROXINE SODIUM 50 MCG: 0.05 TABLET ORAL at 06:02

## 2025-01-10 RX ADMIN — ATORVASTATIN CALCIUM 80 MG: 80 TABLET, FILM COATED ORAL at 20:38

## 2025-01-10 RX ADMIN — ASPIRIN 81 MG: 81 TABLET, COATED ORAL at 18:00

## 2025-01-10 RX ADMIN — SODIUM CHLORIDE 500 ML: 9 INJECTION, SOLUTION INTRAVENOUS at 18:49

## 2025-01-10 RX ADMIN — SODIUM CHLORIDE, PRESERVATIVE FREE 10 ML: 5 INJECTION INTRAVENOUS at 08:33

## 2025-01-10 RX ADMIN — ENOXAPARIN SODIUM 40 MG: 100 INJECTION SUBCUTANEOUS at 08:30

## 2025-01-10 RX ADMIN — LISINOPRIL 10 MG: 20 TABLET ORAL at 08:30

## 2025-01-10 RX ADMIN — SODIUM CHLORIDE, PRESERVATIVE FREE 10 ML: 5 INJECTION INTRAVENOUS at 20:39

## 2025-01-10 RX ADMIN — AMLODIPINE BESYLATE 10 MG: 10 TABLET ORAL at 08:30

## 2025-01-10 ASSESSMENT — PULMONARY FUNCTION TESTS
PIF_VALUE: -8
PIF_VALUE: 0

## 2025-01-10 NOTE — CARE COORDINATION
Patient has had a re evaluation with PT.  Need update OT note for admission to ARU.    Dr. Garcia  is following     Called [facility] tells me that they are able to accept.  Will update patitn and son.  Awaiting ARU acceptance

## 2025-01-10 NOTE — PROGRESS NOTES
Provided: Plan of Care;Fall Prevention Strategies;Mobility Training;ADL Adaptive Strategies;Transfer Training  Education Method: Verbal;Demonstration  Barriers to Learning: Cognition  Education Outcome: Continued education needed    Goals  Short Term Goals  Time Frame for Short Term Goals: Patient will, by discharge  Short Term Goal 1: demo ADLs at Mod I  Short Term Goal 2: demo functional transfers/mobility using LRAD at Mod I to complete ADLs  Short Term Goal 3: verbalize 3+ fall prevention strategies to implement into daily routine/ADL/IADL tasks  Short Term Goal 4: demo 15+ min of dynamic standing tolerance at Supervision with intermittent uni/bilateral hand release to participate in ADLs  Short Term Goal 5: demo functional bending/reaching from various heights at SBA to participate in ADL tasks    Plan  Occupational Therapy Plan  Times Per Week: 3-4x/wk  Current Treatment Recommendations: Strengthening, Balance training, Functional mobility training, Endurance training, Safety education & training, Patient/Caregiver education & training, Self-Care / ADL, Positioning, Equipment evaluation, education, & procurement, Home management training    Minutes  OT Individual Minutes  Time In: 1430  Time Out: 1509  Minutes: 39  Time Code Minutes   Timed Code Treatment Minutes: 38 Minutes    Electronically signed by JEREMIAH Bradford on 1/10/25 at 3:45 PM EST

## 2025-01-10 NOTE — PROGRESS NOTES
[facility]  Internal Medicine Teaching Residency Program  Inpatient Daily Progress Note  ______________________________________________________________________________    Patient: Lanny Jaimes  YOB: 1955   MRN:[id number]    Acct: [id number]     Room: 0115/0115-01  Admit date: 1/7/2025  Today's date: 01/10/25  Number of days in the hospital: 3    SUBJECTIVE   Admitting Diagnosis: Acute intracranial hemorrhage (HCC)  CC: Fall and AMS - IM consulted for management of hypertension    Pt examined at bedside. Chart & results reviewed.     Vitals stable - BP high in the evening as patient did not receive the lisinopril - chart says transfer to procedure area - given labetolol at midnight - better since   Patient went for DSA yesterday - left cavernous saccular aneurysm around 10 mm x 9.92 mm x 9.64 mm - embolization in 2 months time   Patient feeling better - no active symptoms or concerns         ROS:  Constitutional:  negative for chills, fevers, sweats  Respiratory:  negative for cough, dyspnea on exertion, hemoptysis, shortness of breath, wheezing  Cardiovascular:  negative for chest pain, chest pressure/discomfort, lower extremity edema, palpitations  Gastrointestinal:  negative for abdominal pain, constipation, diarrhea, nausea, vomiting  Neurological:  negative for dizziness, headache  BRIEF HISTORY     The patient is a pleasant 69 y.o. female presents with a chief complaint of altered mentation after a fall.  Patient was attempting to get out of bed and she fell down but denied hitting her head or losing consciousness.  She was unable to get up and was found by neighbors who called EMS.  She presented to [facility] facility where she was found to be confused and CT head was suspicious for a small left internal hemorrhage.  She was transferred to [facility] for trauma evaluation.  Trauma evaluation and scans were unremarkable except the small ICH.  She was  transferred to neuro-critical care and transition to neurology as she was remaining stable.       MRI brain reported numerous chronic microhemorrhages including one corresponding to the left parietal lobe hypodense lesions on CT scan.  It did not report acute hemorrhage. Neurosurgery/neurology impression is cerebral amyloid angiopathy.      Patient known to have history of hypertension and hypothyroidism and she was not compliant with her medications for years.     Medicine was consulted for management of blood pressure with goal of below 140 SBP.  On my assessment, patient is oriented X 4 but does have delayed response, frequent forgetfulness and confusing statements at times.  Family has reported cognitive decline recently.  Blood pressure was high initially in the 180s and later on .       Patient denied having any symptoms.  She specifically denied headache, visual/hearing changes, weakness, numbness, chest pain, palpitations, shortness of breath, dizziness, nausea, vomiting, abdominal pain, urinary symptoms or issues with bowels.      25   Norvasc increased to 10 mg and started on lisinopril 10 mg   On PRN hydralazine and labetolol     25     OBJECTIVE     Vital Signs:  /66   Pulse 58   Temp 97.7 °F (36.5 °C) (Oral)   Resp 20   Ht 1.702 m (5' 7.01\")   Wt 67.6 kg (149 lb)   SpO2 98%   BMI 23.33 kg/m²     Temp (24hrs), Av.9 °F (36.6 °C), Min:97.7 °F (36.5 °C), Max:98.1 °F (36.7 °C)    In: -   Out: 400 [Urine:400]    Physical Exam:  Constitutional: This is a well developed, well nourished, 18.5-24.9 - Normal 69 y.o. year old female who is alert, oriented, cooperative and in no apparent distress.  Head:normocephalic and atraumatic.    EENT:  PERRLA.  No conjunctival injections.   Septum was midline, mucosa was without erythema, exudates or cobblestoning.  No thrush was noted.   Neck: Supple without thyromegaly. No elevated JVP. Trachea was midline.  Respiratory: Chest was symmetrical

## 2025-01-10 NOTE — PROGRESS NOTES
Physical Therapy  Facility/Department: [address] NEURO ICU   Physical Therapy Daily Treatment Note    Patient Name: Lanny Jaimes        MRN: [id number]    : 1955    Date of Service: 1/10/2025    Chief Complaint   Patient presents with    Fall     Frequent falls     Altered Mental Status     Past Medical History:  has a past medical history of Thyroid disease.  Past Surgical History:  has no past surgical history on file.    Discharge Recommendations  Discharge Recommendations: Patient would benefit from continued therapy after discharge  PT Equipment Recommendations  Equipment Needed: Yes  Mobility Devices: Walker  Walker: Rolling    Assessment  Body Structures, Functions, Activity Limitations Requiring Skilled Therapeutic Intervention: Decreased functional mobility ;Decreased endurance;Decreased balance;Decreased strength;Decreased coordination;Decreased cognition  Assessment: Pt amb 50 ft x 2 with RW and MIN A due to R LE deficits. Pt high fall risk and unsafe to return to prior living arrangments. WOuld benefit from 3 hours PT/day at [place].  Therapy Prognosis: Good  Activity Tolerance  Activity Tolerance: Patient tolerated treatment well;Treatment limited secondary to decreased cognition  Safety Devices  Type of Devices: Nurse notified;Patient at risk for falls;Call light within reach;Left in bed;Gait belt  Restraints  Restraints Initially in Place: No       Restrictions/Precautions  Restrictions/Precautions  Restrictions/Precautions: Fall Risk  Activity Level: Up as Tolerated  Required Braces or Orthoses?: No  Position Activity Restriction  Other Position/Activity Restrictions: SBP <140, activity as tolerated    Subjective  General  Patient assessed for rehabilitation services?: Yes  Response To Previous Treatment: Patient with no complaints from previous session.  Family/Caregiver Present: No  Follows Commands: Within Functional Limits  Other (Comment): with repetition  Subjective  Subjective: Pt resting in bed

## 2025-01-10 NOTE — CARE COORDINATION
Called Admission for [facility] to see if they can accept patient.  Left voicemail.      Called patient's son and asked if he wants   [facility] vs [facility]

## 2025-01-10 NOTE — PROGRESS NOTES
progressive worsening of cognitive decline and frequent falls over the past year.  On initial evaluation, patient exhibited psychomotor slowing with delayed responses to questioning.  This is likely secondary to hypothyroidism not controlled with replacement therapy.  Otherwise alert and oriented, nonfocal neuroexam.  Initially required Cardene to meet blood pressure goal of less than 160.  Was restarted on her home Synthroid and started on 5 Norvasc.  Stable for transfer to [facility], [place].    : Repeat CTA head and neck showed left ICA cavernous aneurysm.  DSA planned for today.    Past Medical History:     Past Medical History:   Diagnosis Date    Thyroid disease         Past Surgical History:     No past surgical history on file.     Medications Prior to Admission:     Prior to Admission medications    Medication Sig Start Date End Date Taking? Authorizing Provider   levothyroxine (SYNTHROID) 88 MCG tablet Take 1 tablet by mouth Daily    Provider, MD Mohinder        Allergies:     Pcn [penicillins]    Social History:     Tobacco:    reports that she has never smoked. She does not have any smokeless tobacco history on file.  Alcohol:      reports that she does not currently use alcohol.  Drug Use:  reports no history of drug use.    Family History:     No family history on file.    Review of Systems:     Review of Systems  Review of Systems  As per HPI  Respiratory: Negative.     Cardiovascular: Negative.    Gastrointestinal: Negative.    Endocrine: Negative.    Genitourinary: Negative.    Allergic/Immunologic: Negative.    Psychiatric/Behavioral: Negative.     All other systems reviewed and are negative     Physical Exam:   /77   Pulse 55   Temp 97.6 °F (36.4 °C) (Axillary)   Resp (!) 0   Ht 1.702 m (5' 7.01\")   Wt 67.6 kg (149 lb)   SpO2 95%   BMI 23.33 kg/m²   Temp (24hrs), Av.8 °F (36.6 °C), Min:97.6 °F (36.4 °C), Max:98.1 °F (36.7 °C)    No results for input(s): \"POCGLU\" in the last 72

## 2025-01-10 NOTE — PROGRESS NOTES
mg, Oral, Q6H PRN **OR** acetaminophen (TYLENOL) suppository 650 mg, 650 mg, Rectal, Q6H PRN  ondansetron (ZOFRAN-ODT) disintegrating tablet 4 mg, 4 mg, Oral, Q8H PRN **OR** ondansetron (ZOFRAN) injection 4 mg, 4 mg, IntraVENous, Q6H PRN  labetalol (NORMODYNE;TRANDATE) injection 10 mg, 10 mg, IntraVENous, Q1H PRN  hydrALAZINE (APRESOLINE) injection 10 mg, 10 mg, IntraVENous, Q1H PRN      Diagnostics:     CBC:   Recent Labs     01/08/25  0455 01/09/25  0327 01/10/25  0528   WBC 12.1* 10.4 9.7   RBC 4.13 4.09 4.29   HGB 12.4 12.2 12.9   HCT 40.2 39.7 39.8   MCV 97.3 97.1 92.8   RDW 16.1* 16.4* 16.2*    304 281     BMP:    Recent Labs     01/08/25  0455 01/09/25  0327 01/10/25  0528   GLUCOSE 126* 106* 93   BUN 17 23 22   CREATININE 0.9 0.9 0.8   CALCIUM 9.3 9.0 8.8    142 142   K 3.5* 3.9 3.7   * 104 106   CO2 24 26 25   ANIONGAP 13 12 11   LABGLOM 70 70 80     HbA1c:   Lab Results   Component Value Date    LABA1C 6.0 01/08/2025     BNP: No results for input(s): \"BNP\" in the last 72 hours.  PT/INR: No results for input(s): \"PROTIME\", \"INR\" in the last 72 hours.  APTT: No results for input(s): \"APTT\" in the last 72 hours.  CARDIAC ENZYMES:   Recent Labs     01/08/25 0455   TROPHS 28*      FASTING LIPID PANEL:  Lab Results   Component Value Date    CHOL 386 (H) 01/08/2025    HDL 57 01/08/2025    TRIG 269 (H) 01/08/2025     LIVER PROFILE: No results for input(s): \"AST\", \"ALT\", \"BILIDIR\", \"BILITOT\", \"ALKPHOS\" in the last 72 hours.    Invalid input(s): \"ALB\"     Radiology:    DSA 1/9/2025  Findings:  Please see dictated Radiology note for further details  Dolichoectatic vertebrobasilar artery.  Dominant right vertebral artery  Bilateral severely tortuous ICA  Right P-comm infundibulum  Left cavernous medially pointing saccular aneurysm measuring 10.73 mm W x 9.92 mm L x 9.64 mm H x 7 mm N with a parent vessel diameter measuring around 5 mm    Impression/Plan:    Encephalopathy: SLP treating  Cerebral

## 2025-01-10 NOTE — PLAN OF CARE
Problem: Discharge Planning  Goal: Discharge to home or other facility with appropriate resources  Outcome: Progressing     Problem: Neurosensory - Adult  Goal: Absence of seizures  Outcome: Progressing

## 2025-01-10 NOTE — PROGRESS NOTES
Endovascular Neurosurgery Consult      Reason for evaluation: Small left parietal hyperdensity on CT head, diffuse microhemorrhages on GRE MRI     SUBJECTIVE:     Seen and examined, no acute events overnight.  She had left cavernous segment aneurysm DSA yesterday.  Findings will be discussed below.  Trying to keep patient's blood pressure under 140    History of Chief Complaint:      69 year old male with a history of hypertension, hyperlipidemia, hypothyroidism noncompliance with medications presenting with altered mentation that resolved, found to have small left parietal possible ICH-hypertensive CT head.  MRI brain showed underlying hypertensive angiopathy.  Patient has multiple microhemorrhages.  Patient currently back to baseline with strict blood pressure. CTA showed . 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 2 .      Allergies  is allergic to pcn [penicillins].  Medications  Prior to Admission medications    Medication Sig Start Date End Date Taking? Authorizing Provider   levothyroxine (SYNTHROID) 88 MCG tablet Take 1 tablet by mouth Daily    Provider, MD Mohinder    Scheduled Meds:   lisinopril  20 mg Oral Daily    sodium chloride flush  5-40 mL IntraVENous 2 times per day    atorvastatin  80 mg Oral Nightly    levothyroxine  50 mcg Oral Daily    enoxaparin  40 mg SubCUTAneous Daily    amLODIPine  10 mg Oral Daily    sodium chloride flush  5-40 mL IntraVENous 2 times per day     Continuous Infusions:   sodium chloride      sodium chloride       PRN Meds:.sodium chloride flush, sodium chloride, ondansetron **OR** ondansetron, bacitracin, sodium chloride flush, sodium chloride, acetaminophen **OR** acetaminophen, ondansetron **OR** ondansetron, labetalol, hydrALAZINE  Past Medical History   has a past medical history of Thyroid disease.  Past Surgical History   has no past surgical history on file.  Social History   reports that she has never  smoked. She does not have any smokeless tobacco history on file.   reports that she does not currently use alcohol.   reports no history of drug use.  Family History  family history is not on file.    Review of Systems:  CONSTITUTIONAL:  negative for fevers, chills, fatigue and malaise    EYES:  negative for double vision, blurred vision and photophobia     HEENT:  negative for tinnitus, epistaxis and sore throat    RESPIRATORY:  negative for cough, shortness of breath, wheezing    CARDIOVASCULAR:  negative for chest pain, palpitations, syncope, edema    GASTROINTESTINAL:  negative for nausea, vomiting    GENITOURINARY:  negative for incontinence    MUSCULOSKELETAL:  negative for neck or back pain    NEUROLOGICAL:  Negative for weakness and tingling  negative for headaches and dizziness    PSYCHIATRIC:  negative for anxiety      Review of systems otherwise negative.      OBJECTIVE:     Vitals:    01/10/25 0400   BP: 137/66   Pulse: 58   Resp: 20   Temp: 97.7 °F (36.5 °C)   SpO2: 98%        General:  Gen: normal habitus, NAD  HEENT: NCAT, mucosa moist  Cvs: RRR, S1 S2 normal  Resp: symmetric unlabored breathing  Abd: s/nd/nt  Ext: no edema  Skin: no lesions seen, warm and dry    Neuro:  Gen: awake and alert, oriented x3.   Lang/speech: no aphasia or dysarthria. Follows commands.  CN: PERRL, EOMI, VFF, V1-3 intact, face symmetric, hearing intact, shoulder shrug symmetric, tongue midline  Motor: grossly 5/5 UE and LE b/l  Sense: LT intact in all 4 ext.  Coord: FTN and HTS intact b/l  DTR: deferred  Gait: narrow base gait    NIH Stroke Scale:   1a Level of consciousness: 0 - alert; keenly responsive   1b. LOC questions: 1   1c. LOC commands: 0 - performs both tasks correctly   2. Best Gaze: 0 - normal   3. Visual: 0 - no visual loss   4. Facial Palsy: 0 - normal symmetric movement   5a. Motor left arm: 0 - no drift, limb holds 90 (or 45) degrees for full 10 seconds   5b. Motor right arm: 0 - no drift, limb holds 90 (or

## 2025-01-10 NOTE — PROGRESS NOTES
Speech Language Pathology  [facility]    Cognitive Treatment Note    Date: 1/10/2025  Patient’s Name: Lanny Jaimes  MRN: [id number]  Diagnosis:   Patient Active Problem List   Diagnosis Code    Focal hemorrhagic contusion of cerebrum S06.33AA    Acute intracranial hemorrhage (HCC) I62.9    Cerebral amyloid angiopathy (HCC) E85.4, I68.0    Acquired hypothyroidism E03.9    Primary hypertension I10    Fall W19.XXXA    Cerebral aneurysm I67.1    Hypertensive urgency I16.0       Pain: 0/10    Cognitive Treatment    Treatment time: 5934-9467      Subjective: [] Alert [x] Cooperative     [x] Confused     [] Agitated    [] Lethargic      Objective/Assessment:  Attention: Pt tangential, required frequent mod-max verbal cues to redirect    Recall: Delayed recall of 3 related units with distraction: 0/3 increased to 3/3 with max verbal cues, 1/3 increased to 3/3 with mod verbal cues, 1/3 increased to 3/3 with mod verbal cues    Pt. Provided with education regarding memory compensatory strategies. Pt. Encouraged to utilize strategies once discharged from the hospital. Pt. Verbalized understanding.  Tip sheep left at bedside.    Organization:   Generative naming concrete: +2 increased to +6 with min-mod verbal cues    Problem Solving/Reasoning: word associations: 1/5 increased to 4/5 with max verbal cues    Opposites: 0/8 increased to 8/8 with mod-max verbal cues    Other: Pt tangential and required frequent cues to redirect attention throughout evaluation. Pt perseverated on others who live at her apartment complex. Pt's son and daughter present, they note this confusion is new over the last week or so. They also report pt's speech is slower and slightly slurred.    Plan:  [x] Continue ST services    [] Discharge from ST:      Discharge recommendations: [x]  Further therapy recommended at discharge.The patient should be able to tolerate at least 3 hours of therapy per day over 5 days or 15 hours over 7 days.

## 2025-01-11 LAB — GLUCOSE BLD-MCNC: 110 MG/DL (ref 65–105)

## 2025-01-11 PROCEDURE — 6370000000 HC RX 637 (ALT 250 FOR IP)

## 2025-01-11 PROCEDURE — 2060000000 HC ICU INTERMEDIATE R&B

## 2025-01-11 PROCEDURE — 6360000002 HC RX W HCPCS

## 2025-01-11 PROCEDURE — 99232 SBSQ HOSP IP/OBS MODERATE 35: CPT | Performed by: PSYCHIATRY & NEUROLOGY

## 2025-01-11 PROCEDURE — 99232 SBSQ HOSP IP/OBS MODERATE 35: CPT | Performed by: INTERNAL MEDICINE

## 2025-01-11 PROCEDURE — 6370000000 HC RX 637 (ALT 250 FOR IP): Performed by: PSYCHIATRY & NEUROLOGY

## 2025-01-11 PROCEDURE — 2500000003 HC RX 250 WO HCPCS: Performed by: STUDENT IN AN ORGANIZED HEALTH CARE EDUCATION/TRAINING PROGRAM

## 2025-01-11 PROCEDURE — 2500000003 HC RX 250 WO HCPCS

## 2025-01-11 RX ADMIN — LEVOTHYROXINE SODIUM 50 MCG: 0.05 TABLET ORAL at 07:52

## 2025-01-11 RX ADMIN — ATORVASTATIN CALCIUM 80 MG: 80 TABLET, FILM COATED ORAL at 20:07

## 2025-01-11 RX ADMIN — SODIUM CHLORIDE, PRESERVATIVE FREE 10 ML: 5 INJECTION INTRAVENOUS at 20:07

## 2025-01-11 RX ADMIN — LISINOPRIL 20 MG: 20 TABLET ORAL at 07:52

## 2025-01-11 RX ADMIN — SODIUM CHLORIDE, PRESERVATIVE FREE 10 ML: 5 INJECTION INTRAVENOUS at 07:59

## 2025-01-11 RX ADMIN — ASPIRIN 81 MG: 81 TABLET, COATED ORAL at 07:51

## 2025-01-11 RX ADMIN — ENOXAPARIN SODIUM 40 MG: 100 INJECTION SUBCUTANEOUS at 07:52

## 2025-01-11 RX ADMIN — AMLODIPINE BESYLATE 10 MG: 10 TABLET ORAL at 07:52

## 2025-01-11 RX ADMIN — HYDRALAZINE HYDROCHLORIDE 10 MG: 20 INJECTION INTRAMUSCULAR; INTRAVENOUS at 00:05

## 2025-01-11 NOTE — PROGRESS NOTES
[facility]  Internal Medicine Teaching Residency Program  Inpatient Daily Progress Note  ______________________________________________________________________________    Patient: Lanny Jaimes  YOB: 1955   MRN:[id number]    Acct: [id number]     Room: 0139/0139-01  Admit date: 1/7/2025  Today's date: 01/11/25  Number of days in the hospital: 4    SUBJECTIVE   Admitting Diagnosis: Acute intracranial hemorrhage (HCC)  CC: Fall and AMS - IM consulted for management of hypertension    Pt examined at bedside. Chart & results reviewed.     Vitals stable - BP under control except one reading at 23:00 - was given saline bolus - prescribed an additional dose of lisinopril yesterday - not given though   Concern for low urine output last night and given fluids - BMP unremarkable and also had 3 x unmeasured urine output   Patient  continues to feel better - no active symptoms or concerns  Alert and interactive, though, some of statements do not make sense        ROS:  Constitutional:  negative for chills, fevers, sweats  Respiratory:  negative for cough, dyspnea on exertion, hemoptysis, shortness of breath, wheezing  Cardiovascular:  negative for chest pain, chest pressure/discomfort, lower extremity edema, palpitations  Gastrointestinal:  negative for abdominal pain, constipation, diarrhea, nausea, vomiting  Neurological:  negative for dizziness, headache  BRIEF HISTORY     The patient is a pleasant 69 y.o. female presents with a chief complaint of altered mentation after a fall.  Patient was attempting to get out of bed and she fell down but denied hitting her head or losing consciousness.  She was unable to get up and was found by neighbors who called EMS.  [facility] and CT head was suspicious for a small left internal hemorrhage.  She was transferred to [facility] for trauma evaluation.  Trauma evaluation and

## 2025-01-11 NOTE — PLAN OF CARE
Problem: Discharge Planning  Goal: Discharge to home or other facility with appropriate resources  1/11/2025 1819 by Cheryl Sprague RN  Outcome: Progressing  1/11/2025 0516 by Елена Calhoun RN  Outcome: Progressing  Flowsheets (Taken 1/10/2025 2000 by Maru Bower, RN)  Discharge to home or other facility with appropriate resources: Identify barriers to discharge with patient and caregiver     Problem: Neurosensory - Adult  Goal: Achieves stable or improved neurological status  1/11/2025 1819 by Cheryl Sprague RN  Outcome: Progressing  Flowsheets (Taken 1/11/2025 0806)  Achieves stable or improved neurological status: Assess for and report changes in neurological status  1/11/2025 0516 by Елена Calhoun RN  Outcome: Progressing  Flowsheets (Taken 1/10/2025 2000 by Maru Bower, RN)  Achieves stable or improved neurological status: Assess for and report changes in neurological status  Goal: Absence of seizures  1/11/2025 1819 by Cheryl Sprague RN  Outcome: Progressing  1/11/2025 0516 by Елена Calhoun RN  Outcome: Progressing  Flowsheets (Taken 1/10/2025 2000 by Maru Bower, RN)  Absence of seizures: Monitor for seizure activity.  If seizure occurs, document type and location of movements and any associated apnea  Goal: Remains free of injury related to seizures activity  1/11/2025 0516 by Елена Calhoun RN  Outcome: Progressing  Flowsheets (Taken 1/10/2025 2000 by Maru Bower, RN)  Remains free of injury related to seizure activity: Maintain airway, patient safety  and administer oxygen as ordered  Goal: Achieves maximal functionality and self care  1/11/2025 0516 by Елена Calhoun RN  Outcome: Progressing  Flowsheets (Taken 1/10/2025 2000 by Maru Bower, RN)  Achieves maximal functionality and self care: Monitor swallowing and airway patency with patient fatigue and changes in neurological status     Problem: Neurosensory - Adult  Goal: Absence of seizures  1/11/2025 1819 by Cheryl Sprague

## 2025-01-11 NOTE — PLAN OF CARE
Problem: Discharge Planning  Goal: Discharge to home or other facility with appropriate resources  Outcome: Progressing  Miki (Taken 1/10/2025 2000 by Maru Bower, RN)  Discharge to home or other facility with appropriate resources: Identify barriers to discharge with patient and caregiver     Problem: Neurosensory - Adult  Goal: Achieves stable or improved neurological status  Outcome: Progressing  Miki (Taken 1/10/2025 2000 by Maru Bower, RN)  Achieves stable or improved neurological status: Assess for and report changes in neurological status  Goal: Absence of seizures  Outcome: Progressing  Miki (Taken 1/10/2025 2000 by Maru Bower, RN)  Absence of seizures: Monitor for seizure activity.  If seizure occurs, document type and location of movements and any associated apnea  Goal: Remains free of injury related to seizures activity  Outcome: Progressing  Miki (Taken 1/10/2025 2000 by Maru Bower, RN)  Remains free of injury related to seizure activity: Maintain airway, patient safety  and administer oxygen as ordered  Goal: Achieves maximal functionality and self care  Outcome: Progressing  Flowsivan (Taken 1/10/2025 2000 by Maru Bower, RN)  Achieves maximal functionality and self care: Monitor swallowing and airway patency with patient fatigue and changes in neurological status     Problem: Respiratory - Adult  Goal: Achieves optimal ventilation and oxygenation  Outcome: Progressing     Problem: Cardiovascular - Adult  Goal: Maintains optimal cardiac output and hemodynamic stability  Outcome: Progressing  Flowsivan (Taken 1/10/2025 2000 by Maru Bower, RN)  Maintains optimal cardiac output and hemodynamic stability: Monitor blood pressure and heart rate  Goal: Absence of cardiac dysrhythmias or at baseline  Outcome: Progressing  Flowsivan (Taken 1/10/2025 2000 by Maru Bower, RN)  Absence of cardiac dysrhythmias or at baseline: Monitor cardiac rate and rhythm

## 2025-01-11 NOTE — PROGRESS NOTES
Endovascular Neurosurgery Consult      Reason for evaluation: Small left parietal hyperdensity on CT head, diffuse microhemorrhages on GRE MRI     SUBJECTIVE:     Seen and examined, no acute events overnight.  Discussed treatment plans extensively with son and daughter.  Plan for outpatient treatment    History of Chief Complaint:      69 year old male with a history of hypertension, hyperlipidemia, hypothyroidism noncompliance with medications presenting with altered mentation that resolved, found to have small left parietal possible ICH-hypertensive CT head.  MRI brain showed underlying hypertensive angiopathy.  Patient has multiple microhemorrhages.  Patient currently back to baseline with strict blood pressure. CTA showed . 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 2 .      Allergies  is allergic to pcn [penicillins].  Medications  Prior to Admission medications    Medication Sig Start Date End Date Taking? Authorizing Provider   levothyroxine (SYNTHROID) 88 MCG tablet Take 1 tablet by mouth Daily    Provider, MD Mohinder    Scheduled Meds:   lisinopril  10 mg Oral Once    lisinopril  20 mg Oral Daily    aspirin  81 mg Oral Daily    sodium chloride flush  5-40 mL IntraVENous 2 times per day    atorvastatin  80 mg Oral Nightly    levothyroxine  50 mcg Oral Daily    enoxaparin  40 mg SubCUTAneous Daily    amLODIPine  10 mg Oral Daily    sodium chloride flush  5-40 mL IntraVENous 2 times per day     Continuous Infusions:   sodium chloride      sodium chloride       PRN Meds:.sodium chloride flush, sodium chloride, ondansetron **OR** ondansetron, bacitracin, sodium chloride flush, sodium chloride, acetaminophen **OR** acetaminophen, ondansetron **OR** ondansetron, labetalol, hydrALAZINE  Past Medical History   has a past medical history of Thyroid disease.  Past Surgical History   has no past surgical history on file.  Social History   reports that she has

## 2025-01-11 NOTE — PROGRESS NOTES
[facility] Neurology   IN-PATIENT SERVICE   [facility]    Progress Note             Date:   1/11/2025  Patient name:  Lanny Jaimes  Date of admission:  1/7/2025  9:21 AM  MRN:   [id number]  Account:  [id number]  YOB: 1955  PCP:    No primary care provider on file.  Room:   [address] [place] [address]  Code Status:    Full Code    Chief Complaint:     Chief Complaint   Patient presents with    Fall     Frequent falls     Altered Mental Status       Interval hx:     The patient was seen and examined at bedside.   Is vitally stable, alert and oriented x 3. No acute events overnight.  Neuroexam stable  Patient underwent DSA yesterday which showed Left cavernous medially pointing saccular aneurysm measuring 10.73 mm W x 9.92 mm L x 9.64 mm H x 7 mm N with a parent vessel diameter measuring around 5 mm.  NEV planning for outpatient endovascular embolization of this aneurysm in the next couple of months.  Awaiting placement, CM working on it.      Brief History of Present Illness:     69-year-old female presented as a transfer from outside facility as CT head read as small left parietal hemorrhagic contusion after a fall at home.  There were initial concerns for traumatic hemorrhage per notes however unusual appearance on imaging for a traumatic injury. MRI more consistent with CAA.  Initial SBP 220s.  ICH score 0.  Was evaluated by trauma, no acute traumatic injuries and neurocritical care was consulted for admission to neuro ICU.  Patient son at bedside endorsed history of uncontrolled hypertension with noncompliance with hypertension medication for over a year.  Additionally endorses noncompliance with other medications including Synthroid.  Patient underwent thyroid radiation in 2000 for hyperthyroidism resulting in iatrogenic hypothyroidism and started on Synthroid.  Per son, patient's mentation is at her baseline stating \"she is always been like this.\"  However he does report her family reports a

## 2025-01-12 PROCEDURE — 6360000002 HC RX W HCPCS

## 2025-01-12 PROCEDURE — 2500000003 HC RX 250 WO HCPCS

## 2025-01-12 PROCEDURE — 2060000000 HC ICU INTERMEDIATE R&B

## 2025-01-12 PROCEDURE — 6370000000 HC RX 637 (ALT 250 FOR IP): Performed by: PSYCHIATRY & NEUROLOGY

## 2025-01-12 PROCEDURE — 6370000000 HC RX 637 (ALT 250 FOR IP)

## 2025-01-12 PROCEDURE — 97535 SELF CARE MNGMENT TRAINING: CPT

## 2025-01-12 PROCEDURE — 99232 SBSQ HOSP IP/OBS MODERATE 35: CPT | Performed by: PSYCHIATRY & NEUROLOGY

## 2025-01-12 PROCEDURE — 2500000003 HC RX 250 WO HCPCS: Performed by: STUDENT IN AN ORGANIZED HEALTH CARE EDUCATION/TRAINING PROGRAM

## 2025-01-12 RX ORDER — LABETALOL HYDROCHLORIDE 5 MG/ML
10 INJECTION, SOLUTION INTRAVENOUS
Status: DISCONTINUED | OUTPATIENT
Start: 2025-01-12 | End: 2025-01-17

## 2025-01-12 RX ORDER — HYDRALAZINE HYDROCHLORIDE 20 MG/ML
10 INJECTION INTRAMUSCULAR; INTRAVENOUS
Status: DISCONTINUED | OUTPATIENT
Start: 2025-01-12 | End: 2025-01-17

## 2025-01-12 RX ADMIN — SODIUM CHLORIDE, PRESERVATIVE FREE 10 ML: 5 INJECTION INTRAVENOUS at 08:51

## 2025-01-12 RX ADMIN — AMLODIPINE BESYLATE 10 MG: 10 TABLET ORAL at 08:50

## 2025-01-12 RX ADMIN — LEVOTHYROXINE SODIUM 50 MCG: 0.05 TABLET ORAL at 08:50

## 2025-01-12 RX ADMIN — ATORVASTATIN CALCIUM 80 MG: 80 TABLET, FILM COATED ORAL at 19:57

## 2025-01-12 RX ADMIN — Medication 10 MG: at 19:58

## 2025-01-12 RX ADMIN — ENOXAPARIN SODIUM 40 MG: 100 INJECTION SUBCUTANEOUS at 08:50

## 2025-01-12 RX ADMIN — HYDRALAZINE HYDROCHLORIDE 10 MG: 20 INJECTION INTRAMUSCULAR; INTRAVENOUS at 10:34

## 2025-01-12 RX ADMIN — ACETAMINOPHEN 650 MG: 325 TABLET ORAL at 12:30

## 2025-01-12 RX ADMIN — ASPIRIN 81 MG: 81 TABLET, COATED ORAL at 08:50

## 2025-01-12 RX ADMIN — BACITRACIN: 500 OINTMENT TOPICAL at 15:30

## 2025-01-12 RX ADMIN — SODIUM CHLORIDE, PRESERVATIVE FREE 10 ML: 5 INJECTION INTRAVENOUS at 19:58

## 2025-01-12 RX ADMIN — LISINOPRIL 20 MG: 20 TABLET ORAL at 08:50

## 2025-01-12 ASSESSMENT — PAIN - FUNCTIONAL ASSESSMENT: PAIN_FUNCTIONAL_ASSESSMENT: ACTIVITIES ARE NOT PREVENTED

## 2025-01-12 ASSESSMENT — PAIN SCALES - GENERAL
PAINLEVEL_OUTOF10: 3
PAINLEVEL_OUTOF10: 5

## 2025-01-12 ASSESSMENT — PAIN DESCRIPTION - LOCATION: LOCATION: LEG;KNEE

## 2025-01-12 ASSESSMENT — PAIN DESCRIPTION - ORIENTATION: ORIENTATION: RIGHT;LEFT

## 2025-01-12 ASSESSMENT — PAIN DESCRIPTION - DESCRIPTORS: DESCRIPTORS: ACHING

## 2025-01-12 NOTE — PLAN OF CARE
Problem: Discharge Planning  Goal: Discharge to home or other facility with appropriate resources  1/12/2025 0455 by Ana Leonard RN  Outcome: Progressing  1/11/2025 1819 by Cheryl Sprague RN  Outcome: Progressing     Problem: Neurosensory - Adult  Goal: Achieves stable or improved neurological status  1/12/2025 0455 by Ana Leonard RN  Outcome: Progressing  Flowsheets (Taken 1/11/2025 2000)  Achieves stable or improved neurological status: Assess for and report changes in neurological status  1/11/2025 1819 by Cheryl Sprague RN  Outcome: Progressing  Flowsheets (Taken 1/11/2025 0806)  Achieves stable or improved neurological status: Assess for and report changes in neurological status  Goal: Absence of seizures  1/12/2025 0455 by Ana Leonard RN  Outcome: Progressing  1/11/2025 1819 by Cheryl Sprague RN  Outcome: Progressing  Goal: Remains free of injury related to seizures activity  Outcome: Progressing  Goal: Achieves maximal functionality and self care  Outcome: Progressing  Flowsheets (Taken 1/11/2025 2000)  Achieves maximal functionality and self care: Encourage and assist patient to increase activity and self care with guidance from physical therapy/occupational therapy     Problem: Respiratory - Adult  Goal: Achieves optimal ventilation and oxygenation  Outcome: Progressing     Problem: Cardiovascular - Adult  Goal: Maintains optimal cardiac output and hemodynamic stability  Outcome: Progressing  Goal: Absence of cardiac dysrhythmias or at baseline  Outcome: Progressing     Problem: Skin/Tissue Integrity - Adult  Goal: Skin integrity remains intact  Outcome: Progressing  Goal: Incisions, wounds, or drain sites healing without S/S of infection  Outcome: Progressing  Goal: Oral mucous membranes remain intact  Outcome: Progressing     Problem: Musculoskeletal - Adult  Goal: Return mobility to safest level of function  Outcome: Progressing  Goal: Maintain proper alignment

## 2025-01-12 NOTE — PROGRESS NOTES
Endovascular Neurosurgery Consult      Reason for evaluation: Small left parietal hyperdensity on CT head, diffuse microhemorrhages on GRE MRI     SUBJECTIVE:     Seen and examined, no acute events overnight. Plan for outpatient treatment    History of Chief Complaint:      69 year old male with a history of hypertension, hyperlipidemia, hypothyroidism noncompliance with medications presenting with altered mentation that resolved, found to have small left parietal possible ICH-hypertensive CT head.  MRI brain showed underlying hypertensive angiopathy.  Patient has multiple microhemorrhages.  Patient currently back to baseline with strict blood pressure. CTA showed . 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 2 .      Allergies  is allergic to pcn [penicillins].  Medications  Prior to Admission medications    Medication Sig Start Date End Date Taking? Authorizing Provider   levothyroxine (SYNTHROID) 88 MCG tablet Take 1 tablet by mouth Daily    Provider, MD Mohinder    Scheduled Meds:   lisinopril  10 mg Oral Once    lisinopril  20 mg Oral Daily    aspirin  81 mg Oral Daily    sodium chloride flush  5-40 mL IntraVENous 2 times per day    atorvastatin  80 mg Oral Nightly    levothyroxine  50 mcg Oral Daily    enoxaparin  40 mg SubCUTAneous Daily    amLODIPine  10 mg Oral Daily    sodium chloride flush  5-40 mL IntraVENous 2 times per day     Continuous Infusions:   sodium chloride      sodium chloride       PRN Meds:.hydrALAZINE, labetalol, sodium chloride flush, sodium chloride, ondansetron **OR** ondansetron, bacitracin, sodium chloride flush, sodium chloride, acetaminophen **OR** acetaminophen, ondansetron **OR** ondansetron  Past Medical History   has a past medical history of Thyroid disease.  Past Surgical History   has no past surgical history on file.  Social History   reports that she has never smoked. She does not have any smokeless tobacco history on

## 2025-01-12 NOTE — PROGRESS NOTES
Physical Therapy        Physical Therapy Cancel Note      DATE: 2025    NAME: Lanny Jaimes  MRN: [id number]   : 1955      Patient not seen this date for Physical Therapy due to:    Other: Pt with OT. Will check back .      Electronically signed by Melani Patterson PTA on 2025 at 1:53 PM

## 2025-01-12 NOTE — PROGRESS NOTES
[facility] Neurology   IN-PATIENT SERVICE   [facility]    Progress Note             Date:   1/12/2025  Patient name:  Lanny Jaimes  Date of admission:  1/7/2025  9:21 AM  MRN:   [id number]  Account:  [id number]  YOB: 1955  PCP:    No primary care provider on file.  Room:   [address] [place] [address]  Code Status:    Full Code    Chief Complaint:     Chief Complaint   Patient presents with    Fall     Frequent falls     Altered Mental Status       Interval hx:     The patient was seen and examined at bedside.   Is vitally stable, alert and oriented x 3. No acute events overnight.  Neuroexam stable  Awaiting SNF placement.      Brief History of Present Illness:     69-year-old female presented as a transfer from outside facility as CT head read as small left parietal hemorrhagic contusion after a fall at home.  There were initial concerns for traumatic hemorrhage per notes however unusual appearance on imaging for a traumatic injury. MRI more consistent with CAA.  Initial SBP 220s.  ICH score 0.  Was evaluated by trauma, no acute traumatic injuries and neurocritical care was consulted for admission to neuro ICU.  Patient son at bedside endorsed history of uncontrolled hypertension with noncompliance with hypertension medication for over a year.  Additionally endorses noncompliance with other medications including Synthroid.  Patient underwent thyroid radiation in 2000 for hyperthyroidism resulting in iatrogenic hypothyroidism and started on Synthroid.  Per son, patient's mentation is at her baseline stating \"she is always been like this.\"  However he does report her family reports a progressive worsening of cognitive decline and frequent falls over the past year.  On initial evaluation, patient exhibited psychomotor slowing with delayed responses to questioning.  This is likely secondary to hypothyroidism not controlled with replacement therapy.  Otherwise alert and oriented, nonfocal  neuroexam.  Initially required Cardene to meet blood pressure goal of less than 160.  Was restarted on her home Synthroid and started on 5 Norvasc.  [facility]    : Repeat CTA head and neck showed left ICA cavernous aneurysm.  DSA planned for today.    Past Medical History:     Past Medical History:   Diagnosis Date    Thyroid disease         Past Surgical History:     No past surgical history on file.     Medications Prior to Admission:     Prior to Admission medications    Medication Sig Start Date End Date Taking? Authorizing Provider   levothyroxine (SYNTHROID) 88 MCG tablet Take 1 tablet by mouth Daily    Provider, MD Mohinder        Allergies:     Pcn [penicillins]    Social History:     Tobacco:    reports that she has never smoked. She does not have any smokeless tobacco history on file.  Alcohol:      reports that she does not currently use alcohol.  Drug Use:  reports no history of drug use.    Family History:     No family history on file.    Review of Systems:     Review of Systems  Review of Systems  As per HPI  Respiratory: Negative.     Cardiovascular: Negative.    Gastrointestinal: Negative.    Endocrine: Negative.    Genitourinary: Negative.    Allergic/Immunologic: Negative.    Psychiatric/Behavioral: Negative.     All other systems reviewed and are negative     Physical Exam:   BP (!) 136/58   Pulse 81   Temp 97.9 °F (36.6 °C) (Oral)   Resp 18   Ht 1.702 m (5' 7.01\")   Wt 67.6 kg (149 lb)   SpO2 96%   BMI 23.33 kg/m²   Temp (24hrs), Av °F (36.7 °C), Min:97.8 °F (36.6 °C), Max:98.4 °F (36.9 °C)    Recent Labs     01/10/25  2356   POCGLU 110*       Intake/Output Summary (Last 24 hours) at 2025 1231  Last data filed at 2025 0600  Gross per 24 hour   Intake 460 ml   Output 700 ml   Net -240 ml         Neurologic Exam     GENERAL  Appears comfortable and in no distress   HEENT  NC/ AT   HEART  S1 and S2 heard; palpation of pulses: radial pulse

## 2025-01-12 NOTE — PROGRESS NOTES
Occupational Therapy  Occupational Therapy Daily Treatment Note  Facility/Department: [address] STE[address]   Patient Name: Lanny Jaimes        MRN: [id number]    : 1955    Date of Service: 2025    Chief Complaint   Patient presents with    Fall     Frequent falls     Altered Mental Status     Past Medical History:  has a past medical history of Thyroid disease.  Past Surgical History:  has no past surgical history on file.    Discharge Recommendations  Discharge Recommendations: Patient would benefit from continued therapy after discharge  OT Equipment Recommendations  ADL Assistive Devices: Shower Chair with back    Assessment  Performance deficits / Impairments: Decreased functional mobility ;Decreased ADL status;Decreased safe awareness;Decreased endurance;Decreased balance  Assessment: Pt would benefit from continued acute care and post acute care OT to address above listed deficits.  Prognosis: Good  Activity Tolerance  Activity Tolerance: Patient Tolerated treatment well;Patient limited by pain;Patient limited by fatigue  Safety Devices  Type of Devices: Call light within reach;Patient at risk for falls;Nurse notified;Left in chair;Chair alarm in place;Gait belt    AM-PAC  AM-PAC Daily Activity - Inpatient   How much help is needed for putting on and taking off regular lower body clothing?: A Lot  How much help is needed for bathing (which includes washing, rinsing, drying)?: A Lot  How much help is needed for toileting (which includes using toilet, bedpan, or urinal)?: A Lot  How much help is needed for putting on and taking off regular upper body clothing?: A Little  How much help is needed for taking care of personal grooming?: A Little  How much help for eating meals?: None  AM-[facility] Inpatient Daily Activity Raw Score: 16  AM-PAC Inpatient ADL T-Scale Score : 35.96  ADL Inpatient CMS 0-100% Score: 53.32  ADL Inpatient CMS G-Code Modifier :

## 2025-01-13 PROCEDURE — 2500000003 HC RX 250 WO HCPCS: Performed by: STUDENT IN AN ORGANIZED HEALTH CARE EDUCATION/TRAINING PROGRAM

## 2025-01-13 PROCEDURE — 6360000002 HC RX W HCPCS

## 2025-01-13 PROCEDURE — 6370000000 HC RX 637 (ALT 250 FOR IP)

## 2025-01-13 PROCEDURE — 97110 THERAPEUTIC EXERCISES: CPT

## 2025-01-13 PROCEDURE — 6370000000 HC RX 637 (ALT 250 FOR IP): Performed by: PSYCHIATRY & NEUROLOGY

## 2025-01-13 PROCEDURE — 97530 THERAPEUTIC ACTIVITIES: CPT

## 2025-01-13 PROCEDURE — 99232 SBSQ HOSP IP/OBS MODERATE 35: CPT | Performed by: STUDENT IN AN ORGANIZED HEALTH CARE EDUCATION/TRAINING PROGRAM

## 2025-01-13 PROCEDURE — 2060000000 HC ICU INTERMEDIATE R&B

## 2025-01-13 PROCEDURE — 97116 GAIT TRAINING THERAPY: CPT

## 2025-01-13 RX ORDER — HYDROCORTISONE ACETATE PRAMOXINE HCL 1; 1 G/100G; G/100G
CREAM TOPICAL 3 TIMES DAILY PRN
Status: DISCONTINUED | OUTPATIENT
Start: 2025-01-13 | End: 2025-01-17 | Stop reason: HOSPADM

## 2025-01-13 RX ORDER — IBUPROFEN 400 MG/1
800 TABLET, FILM COATED ORAL EVERY 6 HOURS PRN
Status: DISCONTINUED | OUTPATIENT
Start: 2025-01-13 | End: 2025-01-17 | Stop reason: HOSPADM

## 2025-01-13 RX ORDER — LISINOPRIL 20 MG/1
20 TABLET ORAL ONCE
Status: COMPLETED | OUTPATIENT
Start: 2025-01-13 | End: 2025-01-13

## 2025-01-13 RX ORDER — LISINOPRIL 20 MG/1
40 TABLET ORAL DAILY
Status: DISCONTINUED | OUTPATIENT
Start: 2025-01-14 | End: 2025-01-17 | Stop reason: HOSPADM

## 2025-01-13 RX ADMIN — ENOXAPARIN SODIUM 40 MG: 100 INJECTION SUBCUTANEOUS at 09:15

## 2025-01-13 RX ADMIN — ASPIRIN 81 MG: 81 TABLET, COATED ORAL at 09:13

## 2025-01-13 RX ADMIN — HYDRALAZINE HYDROCHLORIDE 10 MG: 20 INJECTION INTRAMUSCULAR; INTRAVENOUS at 07:51

## 2025-01-13 RX ADMIN — IBUPROFEN 800 MG: 400 TABLET, FILM COATED ORAL at 18:31

## 2025-01-13 RX ADMIN — LISINOPRIL 20 MG: 20 TABLET ORAL at 16:03

## 2025-01-13 RX ADMIN — AMLODIPINE BESYLATE 10 MG: 10 TABLET ORAL at 09:13

## 2025-01-13 RX ADMIN — SODIUM CHLORIDE, PRESERVATIVE FREE 10 ML: 5 INJECTION INTRAVENOUS at 09:14

## 2025-01-13 RX ADMIN — ATORVASTATIN CALCIUM 80 MG: 80 TABLET, FILM COATED ORAL at 20:15

## 2025-01-13 RX ADMIN — LEVOTHYROXINE SODIUM 50 MCG: 0.05 TABLET ORAL at 09:13

## 2025-01-13 RX ADMIN — LISINOPRIL 20 MG: 20 TABLET ORAL at 09:13

## 2025-01-13 RX ADMIN — SODIUM CHLORIDE, PRESERVATIVE FREE 10 ML: 5 INJECTION INTRAVENOUS at 20:15

## 2025-01-13 RX ADMIN — HYDRALAZINE HYDROCHLORIDE 10 MG: 20 INJECTION INTRAMUSCULAR; INTRAVENOUS at 17:26

## 2025-01-13 RX ADMIN — ACETAMINOPHEN 650 MG: 325 TABLET ORAL at 15:07

## 2025-01-13 ASSESSMENT — PAIN DESCRIPTION - LOCATION
LOCATION: KNEE
LOCATION: KNEE
LOCATION: KNEE;LEG
LOCATION: KNEE

## 2025-01-13 ASSESSMENT — PAIN DESCRIPTION - DESCRIPTORS
DESCRIPTORS: ACHING
DESCRIPTORS: ACHING;DISCOMFORT
DESCRIPTORS: ACHING
DESCRIPTORS: ACHING;DISCOMFORT

## 2025-01-13 ASSESSMENT — PAIN DESCRIPTION - PAIN TYPE
TYPE: ACUTE PAIN

## 2025-01-13 ASSESSMENT — PAIN DESCRIPTION - FREQUENCY
FREQUENCY: CONTINUOUS

## 2025-01-13 ASSESSMENT — PAIN DESCRIPTION - ONSET
ONSET: ON-GOING
ONSET: ON-GOING

## 2025-01-13 ASSESSMENT — PAIN DESCRIPTION - ORIENTATION
ORIENTATION: RIGHT;LEFT
ORIENTATION: LEFT;RIGHT

## 2025-01-13 ASSESSMENT — PAIN SCALES - GENERAL
PAINLEVEL_OUTOF10: 8
PAINLEVEL_OUTOF10: 4
PAINLEVEL_OUTOF10: 6
PAINLEVEL_OUTOF10: 6

## 2025-01-13 ASSESSMENT — PAIN - FUNCTIONAL ASSESSMENT
PAIN_FUNCTIONAL_ASSESSMENT: ACTIVITIES ARE NOT PREVENTED

## 2025-01-13 NOTE — PLAN OF CARE
Problem: Discharge Planning  Goal: Discharge to home or other facility with appropriate resources  Outcome: Progressing     Problem: Neurosensory - Adult  Goal: Achieves stable or improved neurological status  Outcome: Progressing  Flowsheets (Taken 1/12/2025 2000)  Achieves stable or improved neurological status: Assess for and report changes in neurological status  Goal: Absence of seizures  Outcome: Progressing  Flowsheets (Taken 1/12/2025 2000)  Absence of seizures: Monitor for seizure activity.  If seizure occurs, document type and location of movements and any associated apnea  Goal: Remains free of injury related to seizures activity  Outcome: Progressing  Flowsheets (Taken 1/12/2025 2000)  Remains free of injury related to seizure activity: Maintain airway, patient safety  and administer oxygen as ordered  Goal: Achieves maximal functionality and self care  Outcome: Progressing  Flowsheets (Taken 1/12/2025 2000)  Achieves maximal functionality and self care: Encourage and assist patient to increase activity and self care with guidance from physical therapy/occupational therapy     Problem: Respiratory - Adult  Goal: Achieves optimal ventilation and oxygenation  Outcome: Progressing     Problem: Cardiovascular - Adult  Goal: Maintains optimal cardiac output and hemodynamic stability  Outcome: Progressing  Flowsheets (Taken 1/12/2025 2000)  Maintains optimal cardiac output and hemodynamic stability: Monitor blood pressure and heart rate  Goal: Absence of cardiac dysrhythmias or at baseline  Outcome: Progressing  Flowsheets (Taken 1/12/2025 2000)  Absence of cardiac dysrhythmias or at baseline: Monitor cardiac rate and rhythm     Problem: Skin/Tissue Integrity - Adult  Goal: Skin integrity remains intact  Outcome: Progressing  Goal: Incisions, wounds, or drain sites healing without S/S of infection  Outcome: Progressing  Goal: Oral mucous membranes remain intact  Outcome: Progressing     Problem:  Progressing

## 2025-01-13 NOTE — PLAN OF CARE
Problem: Discharge Planning  Goal: Discharge to home or other facility with appropriate resources  1/13/2025 1337 by Schoenberger, Zachary, RN  Outcome: Progressing  Flowsheets (Taken 1/10/2025 2000 by Maru Bower RN)  Discharge to home or other facility with appropriate resources: Identify barriers to discharge with patient and caregiver  1/13/2025 0457 by Vaishali Land RN  Outcome: Progressing     Problem: Neurosensory - Adult  Goal: Achieves stable or improved neurological status  1/13/2025 1337 by Schoenberger, Zachary, RN  Outcome: Progressing  Flowsheets (Taken 1/12/2025 2000 by Vaishali Land RN)  Achieves stable or improved neurological status: Assess for and report changes in neurological status  1/13/2025 0457 by Vaishali Land RN  Outcome: Progressing  Flowsheets (Taken 1/12/2025 2000)  Achieves stable or improved neurological status: Assess for and report changes in neurological status  Goal: Absence of seizures  1/13/2025 1337 by Schoenberger, Zachary, RN  Outcome: Progressing  Flowsheets (Taken 1/12/2025 2000 by Vaishali Land RN)  Absence of seizures: Monitor for seizure activity.  If seizure occurs, document type and location of movements and any associated apnea  1/13/2025 0457 by Vaishali Land RN  Outcome: Progressing  Flowsheets (Taken 1/12/2025 2000)  Absence of seizures: Monitor for seizure activity.  If seizure occurs, document type and location of movements and any associated apnea  Goal: Remains free of injury related to seizures activity  1/13/2025 1337 by Schoenberger, Zachary, RN  Outcome: Progressing  Flowsheets (Taken 1/12/2025 2000 by Vaishali Land RN)  Remains free of injury related to seizure activity: Maintain airway, patient safety  and administer oxygen as ordered  1/13/2025 0457 by Vaishali Land RN  Outcome: Progressing  Flowsheets (Taken 1/12/2025 2000)  Remains free of injury related to seizure activity: Maintain airway, patient safety  and  redness and/or skin breakdown  1/13/2025 0457 by Vaishali Land RN  Outcome: Progressing  Goal: Incisions, wounds, or drain sites healing without S/S of infection  1/13/2025 1337 by Schoenberger, Zachary, RN  Outcome: Progressing  1/13/2025 0457 by Vaishali Land RN  Outcome: Progressing  Goal: Oral mucous membranes remain intact  1/13/2025 1337 by Schoenberger, Zachary, RN  Outcome: Progressing  1/13/2025 0457 by Vaishali Land RN  Outcome: Progressing     Problem: Musculoskeletal - Adult  Goal: Return mobility to safest level of function  1/13/2025 1337 by Schoenberger, Zachary, RN  Outcome: Progressing  1/13/2025 0457 by Vaishali Land RN  Outcome: Progressing  Goal: Maintain proper alignment of affected body part  1/13/2025 1337 by Schoenberger, Zachary, RN  Outcome: Progressing  1/13/2025 0457 by Vaishali Land RN  Outcome: Progressing  Goal: Return ADL status to a safe level of function  1/13/2025 1337 by Schoenberger, Zachary, RN  Outcome: Progressing  1/13/2025 0457 by Vaishali Land RN  Outcome: Progressing     Problem: Gastrointestinal - Adult  Goal: Minimal or absence of nausea and vomiting  1/13/2025 1337 by Schoenberger, Zachary, RN  Outcome: Progressing  1/13/2025 0457 by Vaishali Land RN  Outcome: Progressing  Goal: Maintains or returns to baseline bowel function  1/13/2025 1337 by Schoenberger, Zachary, RN  Outcome: Progressing  1/13/2025 0457 by Vaishali Land RN  Outcome: Progressing  Goal: Maintains adequate nutritional intake  1/13/2025 1337 by Schoenberger, Zachary, RN  Outcome: Progressing  1/13/2025 0457 by Vaishali Land RN  Outcome: Progressing     Problem: Genitourinary - Adult  Goal: Absence of urinary retention  1/13/2025 1337 by Schoenberger, Zachary, RN  Outcome: Progressing  1/13/2025 0457 by Vaishali Land, RN  Outcome: Progressing     Problem: Infection - Adult  Goal: Absence of infection at discharge  1/13/2025 1337 by Schoenberger, Zachary,

## 2025-01-13 NOTE — PROGRESS NOTES
Endovascular Neurosurgery Progress Note      Reason for evaluation: Small left parietal hyperdensity on CT head, diffuse microhemorrhages on GRE MRI     SUBJECTIVE:     NAEO from EVN perspective. Remains stable with no new complaints or focal neuro deficits.     History of Chief Complaint:      69 year old male with a history of hypertension, hyperlipidemia, hypothyroidism noncompliance with medications presenting with altered mentation that resolved, found to have small left parietal possible ICH-hypertensive CT head.  MRI brain showed underlying hypertensive angiopathy.  Patient has multiple microhemorrhages.  Patient currently back to baseline with strict blood pressure. CTA showed . 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 2 .      Allergies  is allergic to pcn [penicillins].  Medications  Prior to Admission medications    Medication Sig Start Date End Date Taking? Authorizing Provider   levothyroxine (SYNTHROID) 88 MCG tablet Take 1 tablet by mouth Daily    Provider, MD Mohinder    Scheduled Meds:   [START ON 1/14/2025] lisinopril  40 mg Oral Daily    lisinopril  20 mg Oral Once    aspirin  81 mg Oral Daily    sodium chloride flush  5-40 mL IntraVENous 2 times per day    atorvastatin  80 mg Oral Nightly    levothyroxine  50 mcg Oral Daily    enoxaparin  40 mg SubCUTAneous Daily    amLODIPine  10 mg Oral Daily    sodium chloride flush  5-40 mL IntraVENous 2 times per day     Continuous Infusions:   sodium chloride      sodium chloride       PRN Meds:.hydrALAZINE, labetalol, sodium chloride, ondansetron **OR** ondansetron, bacitracin, sodium chloride flush, sodium chloride, acetaminophen **OR** acetaminophen, ondansetron **OR** ondansetron  Past Medical History   has a past medical history of Thyroid disease.  Past Surgical History   has no past surgical history on file.  Social History   reports that she has never smoked. She does not have any smokeless    6a. Motor left le - no drift; leg holds 30 degree position for full 5 seconds   6b Motor right le - no drift; leg holds 30 degree position for full 5 seconds   7. Limb Ataxia: 0 - absent   8. Sensory: 0 - normal; no sensory loss   9. Best Language: 0 - no aphasia, normal   10. Dysarthria: 0 - normal   11. Extinction and Inattention: 0 - no abnormality         Total: 1   MRS: 2      LABS:   Reviewed.  Lab Results   Component Value Date    HGB 12.9 01/10/2025    WBC 9.7 01/10/2025     01/10/2025     01/10/2025    BUN 22 01/10/2025    CREATININE 0.8 01/10/2025    AST 25 2025    ALT 17 2025    APTT 23.2 2025    INR 1.0 2025      No results found for: \"COVID19\"    RADIOLOGY:   Images were personally reviewed including:            DSA 2025  Findings:  Please see dictated Radiology note for further details  Dolichoectatic vertebrobasilar artery.  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    # Hypertensive angiopathy with multiple microhemorrhages    PLAN:     -- -140  -- Aspirin 81 daily monotherapy  -- Ongoing discussions about EVN intervention for L cav ICA aneurysm. Likely outpatient, with potential SEAL device for compassionate use.  -- Rest of care per primary team    Discussed with Dr. Giles.    Please arrange follow-up with Dr. Kishan Cárdenas clinic in 4-8 weeks, and with Dr. Giles in 3-4 months.    Kishan Cárdenas MD, Pager [phone]  Electronically signed 25 at 1:11 PM  Stroke, Neurocritical Care & Neurointervention  [facility]

## 2025-01-13 NOTE — PROGRESS NOTES
[facility] Neurology   IN-PATIENT SERVICE   [facility]    Progress Note             Date:   1/13/2025  Patient name:  Lanny Jaimes  Date of admission:  1/7/2025  9:21 AM  MRN:   [id number]  Account:  [id number]  YOB: 1955  PCP:    No primary care provider on file.  Room:   [address] [place] [address]  Code Status:    Full Code    Chief Complaint:     Chief Complaint   Patient presents with    Fall     Frequent falls     Altered Mental Status       Interval hx:     The patient was seen and examined at bedside.   Is vitally stable, alert and oriented x 3. No acute events overnight.  Concerns of confusion overnight but today morning she was back to baseline.  Little slow to respond but alert and oriented X3,  Neuroexam : A little slow to respond and answer but stable and nonfocal exam  Awaiting SNF placement.      Brief History of Present Illness:     69-year-old female presented as a transfer from outside facility as CT head read as small left parietal hemorrhagic contusion after a fall at home.  There were initial concerns for traumatic hemorrhage per notes however unusual appearance on imaging for a traumatic injury. MRI more consistent with CAA.  Initial SBP 220s.  ICH score 0.  Was evaluated by trauma, no acute traumatic injuries and neurocritical care was consulted for admission to neuro ICU.  Patient son at bedside endorsed history of uncontrolled hypertension with noncompliance with hypertension medication for over a year.  Additionally endorses noncompliance with other medications including Synthroid.  Patient underwent thyroid radiation in 2000 for hyperthyroidism resulting in iatrogenic hypothyroidism and started on Synthroid.  Per son, patient's mentation is at her baseline stating \"she is always been like this.\"  However he does report her family reports a progressive worsening of cognitive decline and frequent falls over the past year.  On initial evaluation, patient exhibited

## 2025-01-13 NOTE — PROGRESS NOTES
Physical Therapy  Facility/Department: [address] [facility]PD[facility]   Physical Therapy Daily Treatment Note    Patient Name: Lanny Jaimes        MRN: [id number]    : 1955    Date of Service: 2025    Chief Complaint   Patient presents with    Fall     Frequent falls     Altered Mental Status     Past Medical History:  has a past medical history of Thyroid disease.  Past Surgical History:  has no past surgical history on file.    Discharge Recommendations  Discharge Recommendations: Patient able to tolerate 3 hours of therapy per day  Further therapy recommended at discharge.The patient should be able to tolerate at least 3 hours of therapy per day over 5 days or 15 hours over 7 days.   This patient may benefit from a Physical Medicine and Rehab consult.   PT Equipment Recommendations  Equipment Needed: Yes  Mobility Devices: Walker  Walker: Rolling    Assessment  Body Structures, Functions, Activity Limitations Requiring Skilled Therapeutic Intervention: Decreased functional mobility ;Decreased endurance;Decreased balance;Decreased strength;Decreased coordination;Decreased cognition  Assessment: Patient presents with mobility, endurance and balance deficits. Patient required ModA for supine->sit, HOB elevated. Osbaldo for transfers from EOB and recliner, use of RW. Ambulation completed with use of RW, 68ft, Osbaldo, however, patient unsteady, requires constant cues for sequencing and safety. Patient with reports of increased fatigue during ambulation, limiting distance, requiring seated rest break and patient declining further ambulation. Patient most limited by deficits in balance, strength, coordination and mobility. Patient would benefit from a RW and 24 hour assistance with mobility upon discharge. Patient would benefit from additional skilled therapy upon discharge to further reduce fall risk.  Therapy Prognosis: Good  Activity Tolerance  Activity Tolerance: Patient tolerated treatment well;Patient limited by  RN and patient agreeable to tx. Patient alert, resting in bed, bed alarm activated, upon therapist arrival. Patient initally with no c/o pain, however, following mobility and seated exercises, patient with c/o pain to bilateral knees. Pain addressed with distraction, emotional support and repositioning. Patient pleasant and cooperative, however, perseverating on son and daughter visiting soon.     Objective  Orientation  Overall Orientation Status: Within Functional Limits  Orientation Level: Oriented X4  Cognition  Overall Cognitive Status: Exceptions  Arousal/Alertness: Delayed responses to stimuli  Following Commands: Follows one step commands with increased time;Follows one step commands with repetition  Attention Span: Difficulty attending to directions;Difficulty dividing attention  Safety Judgement: Decreased awareness of need for assistance;Decreased awareness of need for safety  Problem Solving: Decreased awareness of errors;Assistance required to correct errors made;Assistance required to identify errors made;Assistance required to implement solutions;Assistance required to generate solutions  Insights: Decreased awareness of deficits  Initiation: Requires cues for all  Sequencing: Requires cues for all  Cognition Comment: Increased time and effort required for processing. Constant cues for initaiton, sequencing and redirection.    Mobility   Bed mobility  Supine to Sit: Moderate assistance  Sit to Supine: Unable to assess (Retired to recliner)  Scooting: Moderate assistance  Bed Mobility Comments: HOB elevated. Verbal cues for use of bed rail to right. Patient demo ability to progress BLEs towards EOB with constant verbal and tactile cues. Constant verbal cues required for sequencing and stay attentive to task. ModA required for trunk support during supine->sit. ModA required for scooting forward at EOB for trunk support, however, patient demo ability to scoot forward and retro in recliner with

## 2025-01-13 NOTE — PROGRESS NOTES
Writer at bedside,patient confused and impulsive at this time. Patient searching bedding. Patient stating that she lost something. Patient is unable to communicate what she is looking for. Writer assisted patient by going through personal items with her to see if anything was missing, removing all bedding from bed, and identifying medical equipment. All belongings were accounted for, and no items were found in bed. Bedside guard arrived to room, and updated. Will continue to monitor patient.

## 2025-01-13 NOTE — PROGRESS NOTES
Writer rounding on patient. Found patient laying side ways in her bed with her head over the edge and reaching under the bed. Writer immediately repositioned patient. Patient stating that she was looking for her \"stuff\". Writer assured the patient that there was nothing under her bed.Order placed for virtual .Will continue to monitor patient.

## 2025-01-14 LAB
ALBUMIN SERPL-MCNC: 3.9 G/DL (ref 3.5–5.2)
ALBUMIN/GLOB SERPL: 1.2 {RATIO} (ref 1–2.5)
ALP SERPL-CCNC: 75 U/L (ref 35–104)
ALT SERPL-CCNC: 28 U/L (ref 10–35)
ANION GAP SERPL CALCULATED.3IONS-SCNC: 11 MMOL/L (ref 9–16)
AST SERPL-CCNC: 43 U/L (ref 10–35)
BASOPHILS # BLD: 0.07 K/UL (ref 0–0.2)
BASOPHILS NFR BLD: 1 % (ref 0–2)
BILIRUB SERPL-MCNC: 0.2 MG/DL (ref 0–1.2)
BUN SERPL-MCNC: 34 MG/DL (ref 8–23)
CALCIUM SERPL-MCNC: 9.1 MG/DL (ref 8.6–10.4)
CHLORIDE SERPL-SCNC: 109 MMOL/L (ref 98–107)
CO2 SERPL-SCNC: 22 MMOL/L (ref 20–31)
CREAT SERPL-MCNC: 0.9 MG/DL (ref 0.6–0.9)
EOSINOPHIL # BLD: 0.17 K/UL (ref 0–0.44)
EOSINOPHILS RELATIVE PERCENT: 2 % (ref 1–4)
ERYTHROCYTE [DISTWIDTH] IN BLOOD BY AUTOMATED COUNT: 16.9 % (ref 11.8–14.4)
GFR, ESTIMATED: 70 ML/MIN/1.73M2
GLUCOSE SERPL-MCNC: 112 MG/DL (ref 74–99)
HCT VFR BLD AUTO: 39.6 % (ref 36.3–47.1)
HGB BLD-MCNC: 13 G/DL (ref 11.9–15.1)
IMM GRANULOCYTES # BLD AUTO: 0.09 K/UL (ref 0–0.3)
IMM GRANULOCYTES NFR BLD: 1 %
LYMPHOCYTES NFR BLD: 1.6 K/UL (ref 1.1–3.7)
LYMPHOCYTES RELATIVE PERCENT: 16 % (ref 24–43)
MCH RBC QN AUTO: 30.3 PG (ref 25.2–33.5)
MCHC RBC AUTO-ENTMCNC: 32.8 G/DL (ref 28.4–34.8)
MCV RBC AUTO: 92.3 FL (ref 82.6–102.9)
MONOCYTES NFR BLD: 0.74 K/UL (ref 0.1–1.2)
MONOCYTES NFR BLD: 8 % (ref 3–12)
NEUTROPHILS NFR BLD: 72 % (ref 36–65)
NEUTS SEG NFR BLD: 7.1 K/UL (ref 1.5–8.1)
NRBC BLD-RTO: 0 PER 100 WBC
PLATELET # BLD AUTO: 293 K/UL (ref 138–453)
PMV BLD AUTO: 10.6 FL (ref 8.1–13.5)
POTASSIUM SERPL-SCNC: 4.7 MMOL/L (ref 3.7–5.3)
PROT SERPL-MCNC: 7.1 G/DL (ref 6.6–8.7)
RBC # BLD AUTO: 4.29 M/UL (ref 3.95–5.11)
RBC # BLD: ABNORMAL 10*6/UL
SODIUM SERPL-SCNC: 142 MMOL/L (ref 136–145)
WBC OTHER # BLD: 9.8 K/UL (ref 3.5–11.3)

## 2025-01-14 PROCEDURE — 2060000000 HC ICU INTERMEDIATE R&B

## 2025-01-14 PROCEDURE — 92507 TX SP LANG VOICE COMM INDIV: CPT

## 2025-01-14 PROCEDURE — 6370000000 HC RX 637 (ALT 250 FOR IP)

## 2025-01-14 PROCEDURE — 97129 THER IVNTJ 1ST 15 MIN: CPT

## 2025-01-14 PROCEDURE — 97116 GAIT TRAINING THERAPY: CPT

## 2025-01-14 PROCEDURE — 2500000003 HC RX 250 WO HCPCS

## 2025-01-14 PROCEDURE — 6360000002 HC RX W HCPCS

## 2025-01-14 PROCEDURE — 99232 SBSQ HOSP IP/OBS MODERATE 35: CPT | Performed by: STUDENT IN AN ORGANIZED HEALTH CARE EDUCATION/TRAINING PROGRAM

## 2025-01-14 PROCEDURE — 80053 COMPREHEN METABOLIC PANEL: CPT

## 2025-01-14 PROCEDURE — 6370000000 HC RX 637 (ALT 250 FOR IP): Performed by: PSYCHIATRY & NEUROLOGY

## 2025-01-14 PROCEDURE — 97530 THERAPEUTIC ACTIVITIES: CPT

## 2025-01-14 PROCEDURE — 99231 SBSQ HOSP IP/OBS SF/LOW 25: CPT | Performed by: GENERAL PRACTICE

## 2025-01-14 PROCEDURE — 36415 COLL VENOUS BLD VENIPUNCTURE: CPT

## 2025-01-14 PROCEDURE — 85025 COMPLETE CBC W/AUTO DIFF WBC: CPT

## 2025-01-14 RX ORDER — LEVOTHYROXINE SODIUM 100 UG/1
100 TABLET ORAL DAILY
Status: DISCONTINUED | OUTPATIENT
Start: 2025-01-15 | End: 2025-01-17 | Stop reason: HOSPADM

## 2025-01-14 RX ORDER — CARVEDILOL 6.25 MG/1
6.25 TABLET ORAL 2 TIMES DAILY WITH MEALS
Status: DISCONTINUED | OUTPATIENT
Start: 2025-01-14 | End: 2025-01-17 | Stop reason: HOSPADM

## 2025-01-14 RX ADMIN — HYDROCORTISONE ACETATE PRAMOXINE HCL: 1; 1 CREAM TOPICAL at 21:06

## 2025-01-14 RX ADMIN — ASPIRIN 81 MG: 81 TABLET, COATED ORAL at 09:06

## 2025-01-14 RX ADMIN — CARVEDILOL 6.25 MG: 6.25 TABLET, FILM COATED ORAL at 11:56

## 2025-01-14 RX ADMIN — ACETAMINOPHEN 650 MG: 325 TABLET ORAL at 09:06

## 2025-01-14 RX ADMIN — CARVEDILOL 6.25 MG: 6.25 TABLET, FILM COATED ORAL at 16:45

## 2025-01-14 RX ADMIN — LISINOPRIL 40 MG: 20 TABLET ORAL at 09:06

## 2025-01-14 RX ADMIN — AMLODIPINE BESYLATE 10 MG: 10 TABLET ORAL at 09:06

## 2025-01-14 RX ADMIN — HYDROCORTISONE ACETATE PRAMOXINE HCL: 1; 1 CREAM TOPICAL at 09:07

## 2025-01-14 RX ADMIN — LEVOTHYROXINE SODIUM 50 MCG: 0.05 TABLET ORAL at 09:12

## 2025-01-14 RX ADMIN — SODIUM CHLORIDE, PRESERVATIVE FREE 10 ML: 5 INJECTION INTRAVENOUS at 21:05

## 2025-01-14 RX ADMIN — HYDROCORTISONE ACETATE PRAMOXINE HCL: 1; 1 CREAM TOPICAL at 00:40

## 2025-01-14 RX ADMIN — SODIUM CHLORIDE, PRESERVATIVE FREE 10 ML: 5 INJECTION INTRAVENOUS at 09:08

## 2025-01-14 RX ADMIN — ENOXAPARIN SODIUM 40 MG: 100 INJECTION SUBCUTANEOUS at 09:08

## 2025-01-14 RX ADMIN — ATORVASTATIN CALCIUM 80 MG: 80 TABLET, FILM COATED ORAL at 21:04

## 2025-01-14 ASSESSMENT — PAIN DESCRIPTION - DESCRIPTORS
DESCRIPTORS: ACHING;DISCOMFORT

## 2025-01-14 ASSESSMENT — PAIN DESCRIPTION - ORIENTATION
ORIENTATION: RIGHT;LEFT
ORIENTATION: LEFT;RIGHT
ORIENTATION: RIGHT;LEFT

## 2025-01-14 ASSESSMENT — PAIN DESCRIPTION - PAIN TYPE
TYPE: ACUTE PAIN

## 2025-01-14 ASSESSMENT — PAIN SCALES - GENERAL
PAINLEVEL_OUTOF10: 3
PAINLEVEL_OUTOF10: 2
PAINLEVEL_OUTOF10: 4
PAINLEVEL_OUTOF10: 3
PAINLEVEL_OUTOF10: 6

## 2025-01-14 ASSESSMENT — PAIN DESCRIPTION - LOCATION
LOCATION: KNEE

## 2025-01-14 ASSESSMENT — PAIN - FUNCTIONAL ASSESSMENT
PAIN_FUNCTIONAL_ASSESSMENT: ACTIVITIES ARE NOT PREVENTED

## 2025-01-14 ASSESSMENT — PAIN DESCRIPTION - FREQUENCY
FREQUENCY: CONTINUOUS

## 2025-01-14 ASSESSMENT — PAIN DESCRIPTION - ONSET
ONSET: ON-GOING

## 2025-01-14 NOTE — CARE COORDINATION
TRANSITIONAL PLANNING/Auth initiated via [employer]. Auth ID#[id number]. Auth is pending.

## 2025-01-14 NOTE — CARE COORDINATION
Spoke with Dr. Mcleod, patient is appropriate for ARU, update provided to Fatimah SEALS CM.  Fatimah will start precert.

## 2025-01-14 NOTE — CARE COORDINATION
ACUTE INPATIENT REHABILITATION  Financial Disclosure Statement: Eligibility and Benefit Verification    Patient Name: Lanny Jaimes MRN: [id number]     Disclosure Statement  [facility] Acute Inpatient Rehabilitation at [facility] provides 24 hour individualized service to patients with functional limitations due to but not limited to stroke, brain injury, spinal cord injury, major multiple trauma, hip fracture, amputation, and neurological disorders.  Acute Inpatient Rehabilitation provides rehabilitative nursing, physician coverage, as well as physical therapy, occupational therapy, speech therapy, recreational therapy and other services as deemed necessary by our Board Certified Physical Medicine and Rehabilitation Physicians Dr. Brien Root, Dr. Daniela Garcia, Dr. Dottie Kerr and Dr. Jez Mcleod.    [facility] Acute Inpatient Rehabilitation at [facility] is fully accredited by the Commission on Accreditation of Rehabilitation Facilities (CARF) and The Joint Commission (TJC).    At a minimum, you will receive 5 days of therapy services totaling at least 15 hours per week. Your treatment program will consist of physical therapy at least 7.5 hours per week; occupational therapy 7.5 hours per week; and speech therapy 1.5 hours per week, as applicable.    Your estimated length of stay is currently:  Approximately 2 Weeks  Please Note: Estimated length of stay is based on individual condition and Acute Inpatient Rehabilitation specific needs. Length of stay may vary based upon interdisciplinary team assessment, insurance approval, and patient progression.    Your insurance coverage has been verified as follows:   Date Verified: 01/14/2025   Method:  Online Portal    Primary Insurance: [employer]  Member/Subscriber ID:  [id number]  Coverage: Per Benefit Period  Plan Effective Date: 01/01/2025 Status: Active   Deductible: $0.00   Co-Pay: $325.00/day maximum 6 days   Co-Insurance:

## 2025-01-14 NOTE — PROGRESS NOTES
Speech Language Pathology  [facility]    Cognitive and Speech Treatment Note    Date: 1/14/2025  Patient’s Name: Lanny Jaimes  MRN: [id number]  Diagnosis:   Patient Active Problem List   Diagnosis Code    Focal hemorrhagic contusion of cerebrum S06.33AA    Acute intracranial hemorrhage (HCC) I62.9    Cerebral amyloid angiopathy (HCC) E85.4, I68.0    Acquired hypothyroidism E03.9    Primary hypertension I10    Fall W19.XXXA    Aneurysm of cavernous portion of left internal carotid artery I67.1    Hypertensive urgency I16.0       Pain: 0/10    Cognitive Treatment    Treatment time: 11:00-11:23      Subjective: [] Alert [x] Cooperative     [x] Confused     [] Agitated    [] Lethargic      Objective/Assessment:  Attention: Pt very tangential this date and easily distracted internally. Required frequent max verbal cues to redirect, inconsistent response to cues.    Orientation: Oriented 5/5 with min cue x1    Recall: Delayed recall 1 functional unit: 0/1 not increased with max verbal cues, 0/1 increased to 1/1 with max verbal cues    Organization: Generative naming concrete: +1 increased to +3 with max verbal cues     Problem Solving/Reasoning: Stating situational problems: 25% increased to 50% with max verbal cues    Speech:   Education provided re: compensatory strategies to increase speech intelligibility/clarity.  Pt. Demonstrated limited understanding. Repeated multi syllabic words and phrases with max cues x4 implementing strategies.        Other: Pt with limited attention this date, perseverated on repetitive/tangential topics (including \"drama\" with her friends, unable to elaborate). Difficult to redirect this date.     Plan:  [] Continue ST services    [] Discharge from ST:      Discharge recommendations: []  Further therapy recommended at discharge.The patient should be able to tolerate at least 3 hours of therapy per day over 5 days or 15 hours over 7 days. [x] Further therapy recommended at

## 2025-01-14 NOTE — PROGRESS NOTES
Physical Therapy  Facility/Department: [address] [facility]PDOWN   Physical Therapy Daily Treatment Note    Patient Name: Lanny Jaimes        MRN: [id number]    : 1955    Date of Service: 2025    Chief Complaint   Patient presents with    Fall     Frequent falls     Altered Mental Status     Past Medical History:  has a past medical history of Thyroid disease.  Past Surgical History:  has no past surgical history on file.    Discharge Recommendations  Discharge Recommendations: Patient able to tolerate 3 hours of therapy per day  PT Equipment Recommendations  Equipment Needed: Yes  Mobility Devices: Walker  Walker: Rolling    Assessment  Body Structures, Functions, Activity Limitations Requiring Skilled Therapeutic Intervention: Decreased functional mobility ;Decreased endurance;Decreased balance;Decreased strength;Decreased coordination;Decreased cognition  Assessment: Patient presents with mobility, endurance and balance deficits. Patient required ModA for supine<>sit with HOB elevated. Pt required varying assist levels for sit<>stand; overall modA to stand with use of RW. Ambulation completed with use of RW, 15ft x2 and modA; patient unsteady and requires constant cues for sequencing and safety. Patient with reports of increased fatigue during ambulation, limiting distance, requiring seated rest break and patient declining further ambulation. Patient most limited by deficits in balance, strength, coordination and mobility. Patient would benefit from a RW and 24 hour assistance with mobility upon discharge. Patient would benefit from additional skilled therapy upon discharge to further reduce fall risk.  Therapy Prognosis: Good  Decision Making: Medium Complexity  Activity Tolerance  Activity Tolerance: Patient limited by endurance;Treatment limited secondary to decreased cognition;Patient limited by fatigue  Safety Devices  Type of Devices: Call light within reach;Patient at risk for falls;Nurse notified;Gait  Comments: Transfer training, gait training, seated exercises, pacing and breathing technqiues  Education Method: Verbal;Demonstration  Barriers to Learning: Cognition  Education Outcome: Continued education needed    Plan  Physical Therapy Plan  General Plan:  (5-6x/week)  Current Treatment Recommendations: Strengthening, Balance training, Functional mobility training, Transfer training, Gait training, Safety education & training, Therapeutic activities, Home exercise program, Patient/Caregiver education & training, Equipment evaluation, education, & procurement, Endurance training    Goals  Short Term Goals  Time Frame for Short Term Goals: 14 visits  Short Term Goal 1: Pt will perform sit<>stand transfer with supervision.  Short Term Goal 2: Pt will ambulate 300 feet with least restrictive AD and supervision.  Short Term Goal 3: Pt will demonstrate good- dynamic standing balance to decrease fall risk.  Short Term Goal 4: Pt will tolerate a 35 minute therapy session to promote increased endurance.  Short Term Goal 5: Pt will perform supine<>sit transfer with supervision.    Minutes  PT Individual Minutes  Time In: 1518  Time Out: 1545  Minutes: 27  Time Code Minutes  Timed Code Treatment Minutes: 27 Minutes    Electronically signed by Maciel Shankar PTA on 1/14/25 at 4:18 PM EST

## 2025-01-14 NOTE — PLAN OF CARE
Problem: Discharge Planning  Goal: Discharge to home or other facility with appropriate resources  1/14/2025 1703 by Schoenberger, Zachary, RN  Outcome: Progressing  Flowsheets (Taken 1/10/2025 2000 by Maru Bower, RN)  Discharge to home or other facility with appropriate resources: Identify barriers to discharge with patient and caregiver  1/14/2025 0401 by Amira Sierra, RN  Outcome: Progressing     Problem: Neurosensory - Adult  Goal: Achieves stable or improved neurological status  Outcome: Progressing  Flowsheets (Taken 1/12/2025 2000 by Vaishali Land RN)  Achieves stable or improved neurological status: Assess for and report changes in neurological status  Goal: Absence of seizures  Outcome: Progressing  Flowsheets (Taken 1/12/2025 2000 by Vaishali Land RN)  Absence of seizures: Monitor for seizure activity.  If seizure occurs, document type and location of movements and any associated apnea  Goal: Remains free of injury related to seizures activity  Outcome: Progressing  Flowsheets (Taken 1/12/2025 2000 by Vaishali Land RN)  Remains free of injury related to seizure activity: Maintain airway, patient safety  and administer oxygen as ordered  Goal: Achieves maximal functionality and self care  Outcome: Progressing  Flowsheets (Taken 1/12/2025 2000 by Vaishali Land RN)  Achieves maximal functionality and self care: Encourage and assist patient to increase activity and self care with guidance from physical therapy/occupational therapy     Problem: Respiratory - Adult  Goal: Achieves optimal ventilation and oxygenation  Outcome: Progressing  Flowsheets (Taken 1/11/2025 0806 by Cheryl Sprague, RN)  Achieves optimal ventilation and oxygenation:   Assess for changes in respiratory status   Assess for changes in mentation and behavior     Problem: Cardiovascular - Adult  Goal: Maintains optimal cardiac output and hemodynamic stability  Outcome: Progressing  Flowsheets (Taken 1/12/2025

## 2025-01-14 NOTE — PROGRESS NOTES
Endovascular Neurosurgery Progress Note      Reason for evaluation: Small left parietal hyperdensity on CT head, diffuse microhemorrhages on GRE MRI     SUBJECTIVE:     NAEO from EVN perspective. Continues to work with PT/OT/ST. Discharge planning in progress.    History of Chief Complaint:      69 year old male with a history of hypertension, hyperlipidemia, hypothyroidism noncompliance with medications presenting with altered mentation that resolved, found to have small left parietal possible ICH-hypertensive CT head.  MRI brain showed underlying hypertensive angiopathy.  Patient has multiple microhemorrhages.  Patient currently back to baseline with strict blood pressure. CTA showed . 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 2 .      Allergies  is allergic to pcn [penicillins].  Medications  Prior to Admission medications    Medication Sig Start Date End Date Taking? Authorizing Provider   levothyroxine (SYNTHROID) 88 MCG tablet Take 1 tablet by mouth Daily    Provider, MD Mohinder    Scheduled Meds:   carvedilol  6.25 mg Oral BID     [START ON 1/15/2025] levothyroxine  100 mcg Oral Daily    lisinopril  40 mg Oral Daily    aspirin  81 mg Oral Daily    atorvastatin  80 mg Oral Nightly    enoxaparin  40 mg SubCUTAneous Daily    amLODIPine  10 mg Oral Daily    sodium chloride flush  5-40 mL IntraVENous 2 times per day     Continuous Infusions:   sodium chloride      sodium chloride       PRN Meds:.ibuprofen, hydrocortisone ace-pramoxine, hydrALAZINE, labetalol, sodium chloride, ondansetron **OR** ondansetron, bacitracin, sodium chloride flush, sodium chloride, acetaminophen **OR** acetaminophen  Past Medical History   has a past medical history of Thyroid disease.  Past Surgical History   has no past surgical history on file.  Social History   reports that she has never smoked. She does not have any smokeless tobacco history on file.   reports that she  degree position for full 5 seconds   6b Motor right le - no drift; leg holds 30 degree position for full 5 seconds   7. Limb Ataxia: 0 - absent   8. Sensory: 0 - normal; no sensory loss   9. Best Language: 0 - no aphasia, normal   10. Dysarthria: 0 - normal   11. Extinction and Inattention: 0 - no abnormality         Total: 1   MRS: 2      LABS:   Reviewed.  Lab Results   Component Value Date    HGB 13.0 2025    WBC 9.8 2025     2025     2025    BUN 34 (H) 2025    CREATININE 0.9 2025    AST 43 (H) 2025    ALT 28 2025    APTT 23.2 2025    INR 1.0 2025      No results found for: \"COVID19\"    RADIOLOGY:   Images were personally reviewed including:            DSA 2025  Findings:  Please see dictated Radiology note for further details  Dolichoectatic vertebrobasilar artery.  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    # Hypertensive angiopathy with multiple microhemorrhages    PLAN:     -- -140  -- Aspirin 81 daily monotherapy  -- Ongoing discussions about EVN intervention for L cav ICA aneurysm. Likely outpatient, with potential SEAL device for compassionate use.  -- PT/OT/ST on board  -- Discharge planning in progress  -- Rest of care per primary team    Discussed with Dr. Giles.    Please arrange follow-up with Dr. Kishan Cárdenas clinic in 4-8 weeks, and with Dr. Giles in 3-4 months.    Kishan Cárdenas MD, Pager [phone]  Electronically signed 25 at 3:27 PM  Stroke, Neurocritical Care & Neurointervention  [facility] Stroke [facility]

## 2025-01-14 NOTE — PROGRESS NOTES
[facility] Neurology   IN-PATIENT SERVICE   [facility]    Progress Note             Date:   1/14/2025  Patient name:  Lanny Jaimes  Date of admission:  1/7/2025  9:21 AM  MRN:   [id number]  Account:  [id number]  YOB: 1955  PCP:    No primary care provider on file.  Room:   [address] [place] [address]  Code Status:    Full Code    Chief Complaint:     Chief Complaint   Patient presents with    Fall     Frequent falls     Altered Mental Status       Interval hx:     The patient was seen and examined at bedside.   Is vitally stable, alert and oriented x 2. No acute events overnight. Little slow to respond  Neuroexam : A little slow to respond and answer but stable and nonfocal exam  Awaiting SNF placement.      Brief History of Present Illness:     69-year-old female presented as a transfer from outside facility as CT head read as small left parietal hemorrhagic contusion after a fall at home.  There were initial concerns for traumatic hemorrhage per notes however unusual appearance on imaging for a traumatic injury. MRI more consistent with CAA.  Initial SBP 220s.  ICH score 0.  Was evaluated by trauma, no acute traumatic injuries and neurocritical care was consulted for admission to neuro ICU.  Patient son at bedside endorsed history of uncontrolled hypertension with noncompliance with hypertension medication for over a year.  Additionally endorses noncompliance with other medications including Synthroid.  Patient underwent thyroid radiation in 2000 for hyperthyroidism resulting in iatrogenic hypothyroidism and started on Synthroid.  Per son, patient's mentation is at her baseline stating \"she is always been like this.\"  However he does report her family reports a progressive worsening of cognitive decline and frequent falls over the past year.  On initial evaluation, patient exhibited psychomotor slowing with delayed responses to questioning.  This is likely secondary to hypothyroidism not      Primary Problem  Acute intracranial hemorrhage (HCC)    Active Hospital Problems    Diagnosis Date Noted    Fall [W19.XXXA] 01/09/2025    Aneurysm of cavernous portion of left internal carotid artery [I67.1] 01/09/2025    Hypertensive urgency [I16.0] 01/09/2025    Cerebral amyloid angiopathy (HCC) [E85.4, I68.0] 01/08/2025    Acquired hypothyroidism [E03.9] 01/08/2025    Primary hypertension [I10] 01/08/2025    Focal hemorrhagic contusion of cerebrum [S06.33AA] 01/07/2025    Acute intracranial hemorrhage (HCC) [I62.9] 01/07/2025           Plan:     L Parietal lobe small hyperdensity in the setting of Hypertensive microangiopathy  Left ICA cavernous aneurysm  - Keep SBP <140  - Repeat Head CT stable  -CTA head and neck showed left ICA cavernous aneurysm  - NSG was consulted per protocol, no interventions   - Avoid AC/AP, OK for DVT ppx with Lovenox  -Continue aspirin 81 Mg daily  - Lipitor 80 mg  -Continue amlodipine 10 Mg, lisinopril 40 Mg daily  -NEV on board: DSA done 1/9, planned for outpatient embolization of the aneurysm in the next few months  - Neuro checks per protocol       HTN  -SBP goal less than 140  - noncompliant with home HTN meds for over a year   - prn labetalol and hydralazine  -EF 65%, negative bubble, small pericardial effusion  -Continue Norvasc 10 mg daily and lisinopril 40 Mg daily  -Started coreg 6.25 BID  -IM on board     Hypothyroidism, iatrogenic (s/p radiation 2020 for hyperthyroidism)  - Continue synthroid 50 mcg   - TSH 37, fT4 <0.1  - Repeat TSH in 4-6 weeks for dose adjustment, f/u with pcp    Hyperlipidemia  -  -Continue Lipitor 80 Mg nightly    DVT prophylaxis: Lovenox sq  PT OT: Consulted  Disposition: Awaiting placement, PM&R eval    Consultations:   IP CONSULT TO TRAUMA SURGERY  IP CONSULT TO NEUROSURGERY  IP CONSULT TO NEUROCRITICAL CARE  IP CONSULT TO NEUROSURGERY  IP CONSULT TO STROKE TEAM  IP CONSULT TO PHYSICAL MEDICINE REHAB  IP CONSULT TO ENDOVASCULAR

## 2025-01-14 NOTE — PROGRESS NOTES
Physical Medicine & Rehabilitation  Progress Note        Admitting Physician: Mayo Harris MD    Primary Care Provider: No primary care provider on file.     Chief Complaint: Fall    Brief History:    This is a follow up to the initial consult on MsKim Jaimes is a 69 y.o. right handed female who was admitted to [facility] on 1/7/2025 with Fall (Frequent falls ) and Altered Mental Status    Patient admitted from [facility] after fall from standing height. Small hemorrhagic parietal lobe contusion found and he was transferred to [place] for trauma and neurosurgery care.      Neuro ICU: MRI showing microhemorrhage consistent with possible cerebral amyloid angiopathy. Small L parietal IPH with possible hemorrhagic contusion vs CAA.      Neurosurgery: confirmed multiple foci of microhemorrhage and diffusion restriction with small enhancing lesion R parietal lobe. No indication for surgical intervention. Recommending repeat MRI in a few months and signed off.      Neuro endovascular: recommending strict hypertension management with SBP < 140mm Hg for hypertensive angiopathy. No current indication for antiplatelets. Having altered mentation/encephalopathy. DSA performed 1/9/25 with finding of L cavernous saccular aneurysm and plan for procedure in the next month or two.      Subjective:  Patient seen and examined at bedside.  Overall she feels okay.  She was perseverating on \"getting my number off the spam call list.\"  Endovascular plans for outpatient treatment plan for cerebral aneurysms.      ROS:  Constitutional: negative for anorexia, chills, fatigue, fevers, sweats and weight loss  Eyes: negative for redness and visual disturbance  Ears, nose, mouth, throat, and face: negative for earaches, epistaxis, sore throat and tinnitus  Respiratory: negative for cough and shortness of breath  Cardiovascular: negative for chest pain, dyspnea and palpitations  Gastrointestinal: negative for abdominal pain,  hours.  CARDIAC ENZYMES:   No results for input(s): \"CKMB\", \"CKMBINDEX\", \"TROPONINT\", \"TROPHS\", \"TROPII\" in the last 72 hours.    Invalid input(s): \"CKTOTAL;3\"     FASTING LIPID PANEL:  Lab Results   Component Value Date    CHOL 386 (H) 01/08/2025    HDL 57 01/08/2025    TRIG 269 (H) 01/08/2025     LIVER PROFILE: No results for input(s): \"AST\", \"ALT\", \"BILIDIR\", \"BILITOT\", \"ALKPHOS\" in the last 72 hours.    Invalid input(s): \"ALB\"     Radiology:    DSA 1/9/2025  Findings:  Please see dictated Radiology note for further details  Dolichoectatic vertebrobasilar artery.  Dominant right vertebral artery  Bilateral severely tortuous ICA  Right P-comm infundibulum  Left cavernous medially pointing saccular aneurysm measuring 10.73 mm W x 9.92 mm L x 9.64 mm H x 7 mm N with a parent vessel diameter measuring around 5 mm          Impression/Plan:    Encephalopathy: SLP treating  Cerebral aneurysm, plan for outpatient intervention  Falls  Hypertensive angiopathy  HTN  Hypothyroidism    Recommendation:  Patient has functional decline requiring treatment with PT, OT, and speech therapy. In my opinion the patient will require acute inpatient rehabilitation and meets criteria for [facility] level care once medically stable per primary and consulting services. Anticipate she will be able to tolerate 3 hours of therapy per day or 900 minutes per week in rehabilitation. The patient requires multidisciplinary rehabilitation treatment including medical management by a PM&R physician, 24 hour rehabilitation nursing, Physical/Occupational therapy, speech therapy, rehabilitation social work, and nutrition services. Patient and family also require education in post-hospital precautions and home exercise routine, adaptive techniques and deficit compensation strategies, strengthening and conditioning, equipment prescription and instructions in use. Provision of services in a less intensive environment risks significant complications and more

## 2025-01-14 NOTE — PROGRESS NOTES
Comprehensive Nutrition Assessment    Type and Reason for Visit:  Initial, LOS    Nutrition Recommendations/Plan:   Continue current diet as tolerated  Recommend high kcal/high pro ONS if PO intake < 50% of meals consistently   Will monitor PO intake, wt, and POC     Malnutrition Assessment:  Malnutrition Status:  Insufficient data (01/14/25 1200)      Nutrition Assessment:    69 y.o.F admitted d/t fall, ICA aneurysm. Pt assessed for LOS. Per chart, no wt hx to assess. BMI WDL for age. PO intake reported as 50-75% on Reg diet. No BM documented, per pt has had BM during admission. BGs 106-126 mg/dL x 24 hrs. No acute nutrition concerns or interventions at this time. Recommend ONS if PO intake is consistently < 50% of meals.    Nutrition Related Findings:    Meds/Labs reviewed Wound Type: None       Current Nutrition Intake & Therapies:    Average Meal Intake: 51-75%  Average Supplements Intake: None Ordered  ADULT DIET; Regular    Anthropometric Measures:  Height: 170.2 cm (5' 7.01\")  Ideal Body Weight (IBW): 135 lbs (61 kg)    Current Body Weight: 67.6 kg (149 lb 0.5 oz), 110.4 % IBW.    Current BMI (kg/m2): 23.3    Estimated Daily Nutrient Needs:  Energy Requirements Based On: Kcal/kg  Weight Used for Energy Requirements: Current  Energy (kcal/day): 9463-8794 kcals/day  Weight Used for Protein Requirements: Current  Protein (g/day): 68-82 g/day  Method Used for Fluid Requirements: 1 ml/kcal  Fluid (ml/day): 4411-0583 ml/day    Nutrition Diagnosis:   No nutrition diagnosis at this time     Nutrition Interventions:   Food and/or Nutrient Delivery: Continue Current Diet  Nutrition Education/Counseling: No recommendation at this time  Coordination of Nutrition Care: Continue to monitor while inpatient    Nutrition Monitoring and Evaluation:   Behavioral-Environmental Outcomes: None Identified  Food/Nutrient Intake Outcomes: Food and Nutrient Intake  Physical Signs/Symptoms Outcomes: Biochemical Data, Meal Time

## 2025-01-15 LAB
ANION GAP SERPL CALCULATED.3IONS-SCNC: 11 MMOL/L (ref 9–16)
BASOPHILS # BLD: 0.09 K/UL (ref 0–0.2)
BASOPHILS NFR BLD: 1 % (ref 0–2)
BUN SERPL-MCNC: 32 MG/DL (ref 8–23)
CALCIUM SERPL-MCNC: 8.9 MG/DL (ref 8.6–10.4)
CHLORIDE SERPL-SCNC: 109 MMOL/L (ref 98–107)
CO2 SERPL-SCNC: 21 MMOL/L (ref 20–31)
CREAT SERPL-MCNC: 0.7 MG/DL (ref 0.6–0.9)
EOSINOPHIL # BLD: 0.22 K/UL (ref 0–0.44)
EOSINOPHILS RELATIVE PERCENT: 2 % (ref 1–4)
ERYTHROCYTE [DISTWIDTH] IN BLOOD BY AUTOMATED COUNT: 16.7 % (ref 11.8–14.4)
GFR, ESTIMATED: >90 ML/MIN/1.73M2
GLUCOSE SERPL-MCNC: 123 MG/DL (ref 74–99)
HCT VFR BLD AUTO: 39.6 % (ref 36.3–47.1)
HGB BLD-MCNC: 12.3 G/DL (ref 11.9–15.1)
IMM GRANULOCYTES # BLD AUTO: 0.09 K/UL (ref 0–0.3)
IMM GRANULOCYTES NFR BLD: 1 %
LYMPHOCYTES NFR BLD: 1.49 K/UL (ref 1.1–3.7)
LYMPHOCYTES RELATIVE PERCENT: 16 % (ref 24–43)
MCH RBC QN AUTO: 29.4 PG (ref 25.2–33.5)
MCHC RBC AUTO-ENTMCNC: 31.1 G/DL (ref 28.4–34.8)
MCV RBC AUTO: 94.5 FL (ref 82.6–102.9)
MONOCYTES NFR BLD: 0.69 K/UL (ref 0.1–1.2)
MONOCYTES NFR BLD: 7 % (ref 3–12)
NEUTROPHILS NFR BLD: 73 % (ref 36–65)
NEUTS SEG NFR BLD: 6.91 K/UL (ref 1.5–8.1)
NRBC BLD-RTO: 0 PER 100 WBC
PLATELET # BLD AUTO: 337 K/UL (ref 138–453)
PMV BLD AUTO: 9.5 FL (ref 8.1–13.5)
POTASSIUM SERPL-SCNC: 3.9 MMOL/L (ref 3.7–5.3)
RBC # BLD AUTO: 4.19 M/UL (ref 3.95–5.11)
RBC # BLD: ABNORMAL 10*6/UL
SODIUM SERPL-SCNC: 141 MMOL/L (ref 136–145)
WBC OTHER # BLD: 9.5 K/UL (ref 3.5–11.3)

## 2025-01-15 PROCEDURE — 85025 COMPLETE CBC W/AUTO DIFF WBC: CPT

## 2025-01-15 PROCEDURE — 6370000000 HC RX 637 (ALT 250 FOR IP): Performed by: PSYCHIATRY & NEUROLOGY

## 2025-01-15 PROCEDURE — 97530 THERAPEUTIC ACTIVITIES: CPT

## 2025-01-15 PROCEDURE — 6370000000 HC RX 637 (ALT 250 FOR IP)

## 2025-01-15 PROCEDURE — 97535 SELF CARE MNGMENT TRAINING: CPT

## 2025-01-15 PROCEDURE — 80048 BASIC METABOLIC PNL TOTAL CA: CPT

## 2025-01-15 PROCEDURE — 99232 SBSQ HOSP IP/OBS MODERATE 35: CPT | Performed by: STUDENT IN AN ORGANIZED HEALTH CARE EDUCATION/TRAINING PROGRAM

## 2025-01-15 PROCEDURE — 2500000003 HC RX 250 WO HCPCS

## 2025-01-15 PROCEDURE — 97110 THERAPEUTIC EXERCISES: CPT

## 2025-01-15 PROCEDURE — 2060000000 HC ICU INTERMEDIATE R&B

## 2025-01-15 PROCEDURE — 6360000002 HC RX W HCPCS

## 2025-01-15 PROCEDURE — 36415 COLL VENOUS BLD VENIPUNCTURE: CPT

## 2025-01-15 RX ADMIN — ENOXAPARIN SODIUM 40 MG: 100 INJECTION SUBCUTANEOUS at 08:58

## 2025-01-15 RX ADMIN — SODIUM CHLORIDE, PRESERVATIVE FREE 10 ML: 5 INJECTION INTRAVENOUS at 08:59

## 2025-01-15 RX ADMIN — ASPIRIN 81 MG: 81 TABLET, COATED ORAL at 08:58

## 2025-01-15 RX ADMIN — CARVEDILOL 6.25 MG: 6.25 TABLET, FILM COATED ORAL at 18:18

## 2025-01-15 RX ADMIN — LISINOPRIL 40 MG: 20 TABLET ORAL at 08:58

## 2025-01-15 RX ADMIN — ATORVASTATIN CALCIUM 80 MG: 80 TABLET, FILM COATED ORAL at 20:27

## 2025-01-15 RX ADMIN — BACITRACIN: 500 OINTMENT TOPICAL at 08:59

## 2025-01-15 RX ADMIN — AMLODIPINE BESYLATE 10 MG: 10 TABLET ORAL at 08:58

## 2025-01-15 RX ADMIN — CARVEDILOL 6.25 MG: 6.25 TABLET, FILM COATED ORAL at 08:58

## 2025-01-15 RX ADMIN — LEVOTHYROXINE SODIUM 100 MCG: 100 TABLET ORAL at 08:58

## 2025-01-15 RX ADMIN — SODIUM CHLORIDE, PRESERVATIVE FREE 10 ML: 5 INJECTION INTRAVENOUS at 20:30

## 2025-01-15 ASSESSMENT — PAIN SCALES - GENERAL
PAINLEVEL_OUTOF10: 0

## 2025-01-15 NOTE — PROGRESS NOTES
Occupational Therapy  Occupational Therapy Daily Treatment Note  Facility/Department: [address]   Patient Name: Lanny Jaimes        MRN: [id number]    : 1955    Date of Service: 1/15/2025    Chief Complaint   Patient presents with    Fall     Frequent falls     Altered Mental Status     Past Medical History:  has a past medical history of Thyroid disease.  Past Surgical History:  has no past surgical history on file.    Discharge Recommendations  Discharge Recommendations: Patient would benefit from continued therapy after discharge, Patient able to tolerate 3 hours of therapy per day  Further therapy recommended at discharge.The patient should be able to tolerate at least 3 hours of therapy per day over 5 days or 15 hours over 7 days.   This patient may benefit from a Physical Medicine and Rehab consult.     OT Equipment Recommendations  Equipment Needed: Yes  ADL Assistive Devices: Shower Chair with back    Assessment  Performance deficits / Impairments: Decreased functional mobility ;Decreased ADL status;Decreased safe awareness;Decreased endurance;Decreased balance    Assessment: Pt presents to OT this date with above noted deficits. Pt is not currently functioning at [facility]. Pt requires Mod A sup>sit, Mod A transfers w/ RW, and CGA functional mobility w/ RW. Pt demo impulsive and poor safety awareness throughout requiring Max VC and redirection to maintain safety. D/t this Pt does not demonstrate the functional ability to safely return to prior living arrangements. It is recommended that Pt recieve OT services during hospitalization and after discharge to increase safety/ADLs for safe return to previous environment.    Prognosis: Good  Decision Making: Medium Complexity  REQUIRES OT FOLLOW-UP: Yes  Activity Tolerance  Activity Tolerance: Patient Tolerated treatment well;Treatment limited secondary to decreased cognition  Safety Devices  Type of Devices: Call light within reach;Chair alarm in  Solving: Decreased awareness of errors;Assistance required to correct errors made;Assistance required to identify errors made;Assistance required to implement solutions;Assistance required to generate solutions  Insights: Decreased awareness of deficits  Initiation: Requires cues for all  Sequencing: Requires cues for all  Cognition Comment: Increased time for Pt to process commands, over whelmed by Max VC, multiple cues to redirect/sequencing/direction following.    Activities of Daily Living  Feeding: Independent  Feeding Skilled Clinical Factors: Pt eating food from tray upon arrival able to open silverwear package to obtain fork  LE Dressing: Contact guard assistance  LE Dressing Skilled Clinical Factors: Pt able to bring B feet into 4-point to advance socks from ankles to readjust seated EOB CGA for balance.  Putting On/Taking Off Footwear: Contact guard assistance  Putting On/Taking Off Footwear Skilled Clinical Factors: Pt able to bring B feet into 4-point to advance socks from ankles to readjust seated EOB CGA for balance.  Toileting: Moderate assistance  Toileting Skilled Clinical Factors: Mod A transfer, SBA seated voiding, CGA seated Pericare. VC throughout for use of B grab bars to maintain balance.    Balance  Balance  Sitting:  (Static: SBA EOB/Toilet ~10min w/ intermittant to full BUE support, Dynamic: CGA ~2min Pericare seated on toilet)  Standing:  (Static/dynamic: CGA w/ RW)    Transfers/Mobility  Bed mobility  Supine to Sit: Moderate assistance (HOB elevated w/ L railing use)  Scooting: Minimal assistance (B railings to EOB Max VC to place hands correctly)  Bed Mobility Comments: Pt supine in bed at start of session and retired to recliner at end. Significantly increased time/effort to progress to EOB. Max VC and assist for BLE and torso to EOB. Pt required B railings to scoot forward to EOB with Pt perseverating on wiping the bed surface.    Transfers  Sit to stand: Moderate assistance (w/

## 2025-01-15 NOTE — PROGRESS NOTES
Occupational Therapy  Occupational Therapy Daily Treatment Note  Facility/Department: [address]   Patient Name: Lanny Jaimes        MRN: [id number]    : 1955    Date of Service: 1/15/2025    Chief Complaint   Patient presents with    Fall     Frequent falls     Altered Mental Status     Past Medical History:  has a past medical history of Thyroid disease.  Past Surgical History:  has no past surgical history on file.    Discharge Recommendations  Discharge Recommendations: Patient would benefit from continued therapy after discharge, Patient able to tolerate 3 hours of therapy per day  OT Equipment Recommendations  Equipment Needed: Yes  ADL Assistive Devices: Shower Chair with back    Assessment  Performance deficits / Impairments: Decreased functional mobility ;Decreased ADL status;Decreased safe awareness;Decreased endurance;Decreased balance;Decreased cognition  Assessment: Pt would benefit from continued acute care and post acute care OT to address above listed deficits.  Prognosis: Good  Decision Making: Medium Complexity  REQUIRES OT FOLLOW-UP: Yes  Activity Tolerance  Activity Tolerance: Patient Tolerated treatment well;Patient limited by pain;Patient limited by fatigue  Safety Devices  Type of Devices: Call light within reach;Patient at risk for falls;Nurse notified;Left in chair;Chair alarm in place;Gait belt  Restraints  Restraints Initially in Place: No    AM-PAC  AM-PAC Daily Activity - Inpatient   How much help is needed for putting on and taking off regular lower body clothing?: A Lot  How much help is needed for bathing (which includes washing, rinsing, drying)?: A Lot  How much help is needed for toileting (which includes using toilet, bedpan, or urinal)?: A Lot  How much help is needed for putting on and taking off regular upper body clothing?: A Little  How much help is needed for taking care of personal grooming?: A Lot  How much help for eating meals?: A Little  AM-PAC Inpatient  redirect/sequencing/direction following.    Activities of Daily Living  Feeding: Independent  Grooming: Increased time to complete;Verbal cueing;Setup;Moderate assistance (Mod-wash/dry hair, SBA-wash face, brush teeth, comb hair)  UE Bathing: Stand by assistance;Setup;Verbal cueing;Increased time to complete (assist to wash back)  LE Bathing: Moderate assistance;Setup;Verbal cueing;Increased time to complete (assist to wash lower legs, feet and rohit area)  UE Dressing: Stand by assistance;Setup;Verbal cueing;Increased time to complete (assist to doff/don gown)  LE Dressing: Moderate assistance;Setup;Verbal cueing;Increased time to complete (assist doff/don footies and brief)  Toileting: Moderate assistance;Setup;Increased time to complete;Adaptive equipment (incont of urine in brief, assist for rohit care standing at chair and to change brief w/RW and CGA)  Additional Comments: Pt up in chair upon arrival. Setup at tray table for self care (see above for LOF). STS for rohit care and to change soiled brief. Pt left w/PTA at chair side. Pt needs vc's to stay on task. Pt needed increased time and assist d/t fatigue, pain, overall weakness and difficulty staying on task.    Balance  Balance  Sitting: With support;Without support (seated in recliner ~43 min w/SBA)  Standing: With support (stood x2 w/min assist using RW for rohit care and to change brief ~3 min total)    Transfers/Mobility  Bed Mobility Comments: RADHA-up in chair upon arrival and at end of tx  Transfers  Sit to stand: Minimal assistance  Stand to sit: Minimal assistance  Transfer Comments: vc's for hand placement, to push off, reach back and to hold onto RW when standing w/fair carryover  Toilet Transfers  Toilet - Technique: Ambulating (w/ RW)  Equipment Used: Standard toilet (w/ Grab bars)  Toilet Transfer: Moderate assistance  Toilet Transfers Comments: Max VC and tactile cues to complete proper positioning at toilet w/ Pt demo attempt to sit prior to

## 2025-01-15 NOTE — PLAN OF CARE
Problem: Discharge Planning  Goal: Discharge to home or other facility with appropriate resources  1/15/2025 0601 by Amira Sierra RN  Outcome: Progressing

## 2025-01-15 NOTE — PROGRESS NOTES
Endovascular Neurosurgery Progress Note      Reason for evaluation: Small left parietal hyperdensity on CT head, diffuse microhemorrhages on GRE MRI     SUBJECTIVE:     NAEO from EVN perspective. Continues to have steady neurological improvements. Pending discharge planning.    History of Chief Complaint:      69 year old male with a history of hypertension, hyperlipidemia, hypothyroidism noncompliance with medications presenting with altered mentation that resolved, found to have small left parietal possible ICH-hypertensive CT head.  MRI brain showed underlying hypertensive angiopathy.  Patient has multiple microhemorrhages.  Patient currently back to baseline with strict blood pressure. CTA showed . 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 2 .      Allergies  is allergic to pcn [penicillins].  Medications  Prior to Admission medications    Medication Sig Start Date End Date Taking? Authorizing Provider   levothyroxine (SYNTHROID) 88 MCG tablet Take 1 tablet by mouth Daily    Provider, MD Mohinder    Scheduled Meds:   carvedilol  6.25 mg Oral BID WC    levothyroxine  100 mcg Oral Daily    lisinopril  40 mg Oral Daily    aspirin  81 mg Oral Daily    atorvastatin  80 mg Oral Nightly    enoxaparin  40 mg SubCUTAneous Daily    amLODIPine  10 mg Oral Daily    sodium chloride flush  5-40 mL IntraVENous 2 times per day     Continuous Infusions:   sodium chloride      sodium chloride       PRN Meds:.ibuprofen, hydrocortisone ace-pramoxine, hydrALAZINE, labetalol, sodium chloride, ondansetron **OR** ondansetron, bacitracin, sodium chloride flush, sodium chloride, acetaminophen **OR** acetaminophen  Past Medical History   has a past medical history of Thyroid disease.  Past Surgical History   has no past surgical history on file.  Social History   reports that she has never smoked. She does not have any smokeless tobacco history on file.   reports that she does not  currently use alcohol.   reports no history of drug use.  Family History  family history is not on file.    Review of Systems:  CONSTITUTIONAL:  negative for fevers, chills, fatigue and malaise    EYES:  negative for double vision, blurred vision and photophobia     HEENT:  negative for tinnitus, epistaxis and sore throat    RESPIRATORY:  negative for cough, shortness of breath, wheezing    CARDIOVASCULAR:  negative for chest pain, palpitations, syncope, edema    GASTROINTESTINAL:  negative for nausea, vomiting    GENITOURINARY:  negative for incontinence    MUSCULOSKELETAL:  negative for neck or back pain    NEUROLOGICAL:  Negative for weakness and tingling  negative for headaches and dizziness    PSYCHIATRIC:  negative for anxiety      Review of systems otherwise negative.      OBJECTIVE:     Vitals:    01/15/25 1227   BP: 104/69   Pulse: 64   Resp: 27   Temp: 97.4 °F (36.3 °C)   SpO2: 94%        General:  Gen: normal habitus, NAD  HEENT: NCAT, mucosa moist  Cvs: RRR, S1 S2 normal  Resp: symmetric unlabored breathing  Abd: s/nd/nt  Ext: no edema  Skin: no lesions seen, warm and dry    Neuro:  Gen: awake and alert, oriented x3.   Lang/speech: no aphasia or dysarthria. Follows commands.  CN: PERRL, EOMI, VFF, V1-3 intact, face symmetric, hearing intact, shoulder shrug symmetric, tongue midline  Motor: grossly 5/5 UE and LE b/l  Sense: LT intact in all 4 ext.  Coord: FTN and HTS intact b/l  DTR: deferred  Gait: narrow base gait    NIH Stroke Scale:   1a Level of consciousness: 0 - alert; keenly responsive   1b. LOC questions: 1   1c. LOC commands: 0 - performs both tasks correctly   2. Best Gaze: 0 - normal   3. Visual: 0 - no visual loss   4. Facial Palsy: 0 - normal symmetric movement   5a. Motor left arm: 0 - no drift, limb holds 90 (or 45) degrees for full 10 seconds   5b. Motor right arm: 0 - no drift, limb holds 90 (or 45) degrees for full 10 seconds   6a. Motor left le - no drift; leg holds 30 degree

## 2025-01-15 NOTE — PROGRESS NOTES
Total Bilirubin 0.2 0.0 - 1.2 mg/dL    Alkaline Phosphatase 75 35 - 104 U/L    ALT 28 10 - 35 U/L    AST 43 (H) 10 - 35 U/L   CBC with Auto Differential    Collection Time: 01/14/25 11:44 AM   Result Value Ref Range    WBC 9.8 3.5 - 11.3 k/uL    RBC 4.29 3.95 - 5.11 m/uL    Hemoglobin 13.0 11.9 - 15.1 g/dL    Hematocrit 39.6 36.3 - 47.1 %    MCV 92.3 82.6 - 102.9 fL    MCH 30.3 25.2 - 33.5 pg    MCHC 32.8 28.4 - 34.8 g/dL    RDW 16.9 (H) 11.8 - 14.4 %    Platelets 293 138 - 453 k/uL    MPV 10.6 8.1 - 13.5 fL    NRBC Automated 0.0 0.0 per 100 WBC    Neutrophils % 72 (H) 36 - 65 %    Lymphocytes % 16 (L) 24 - 43 %    Monocytes % 8 3 - 12 %    Eosinophils % 2 1 - 4 %    Basophils % 1 0 - 2 %    Immature Granulocytes % 1 (H) 0 %    Neutrophils Absolute 7.10 1.50 - 8.10 k/uL    Lymphocytes Absolute 1.60 1.10 - 3.70 k/uL    Monocytes Absolute 0.74 0.10 - 1.20 k/uL    Eosinophils Absolute 0.17 0.00 - 0.44 k/uL    Basophils Absolute 0.07 0.00 - 0.20 k/uL    Immature Granulocytes Absolute 0.09 0.00 - 0.30 k/uL    RBC Morphology ANISOCYTOSIS PRESENT    CBC with Auto Differential    Collection Time: 01/15/25  9:29 AM   Result Value Ref Range    WBC 9.5 3.5 - 11.3 k/uL    RBC 4.19 3.95 - 5.11 m/uL    Hemoglobin 12.3 11.9 - 15.1 g/dL    Hematocrit 39.6 36.3 - 47.1 %    MCV 94.5 82.6 - 102.9 fL    MCH 29.4 25.2 - 33.5 pg    MCHC 31.1 28.4 - 34.8 g/dL    RDW 16.7 (H) 11.8 - 14.4 %    Platelets 337 138 - 453 k/uL    MPV 9.5 8.1 - 13.5 fL    NRBC Automated 0.0 0.0 per 100 WBC    Neutrophils % 73 (H) 36 - 65 %    Lymphocytes % 16 (L) 24 - 43 %    Monocytes % 7 3 - 12 %    Eosinophils % 2 1 - 4 %    Basophils % 1 0 - 2 %    Immature Granulocytes % 1 (H) 0 %    Neutrophils Absolute 6.91 1.50 - 8.10 k/uL    Lymphocytes Absolute 1.49 1.10 - 3.70 k/uL    Monocytes Absolute 0.69 0.10 - 1.20 k/uL    Eosinophils Absolute 0.22 0.00 - 0.44 k/uL    Basophils Absolute 0.09 0.00 - 0.20 k/uL    Immature Granulocytes Absolute 0.09 0.00 - 0.30 k/uL  planned for outpatient embolization of the aneurysm in the next few months  - Neuro checks per protocol  -Follow-up brain MRI with contrast in 1-3 months to ensure stability of the  enhancing lesion.         HTN  -SBP goal less than 140  - noncompliant with home HTN meds for over a year   - prn labetalol and hydralazine  -EF 65%, negative bubble, small pericardial effusion  -Continue Norvasc 10 mg daily and lisinopril 40 Mg daily  -Started coreg 6.25 BID  -IM on board     Hypothyroidism, iatrogenic (s/p radiation 2020 for hyperthyroidism)  - Continue synthroid 50 mcg   - TSH 37, fT4 <0.1  - Repeat TSH in 4-6 weeks for dose adjustment, f/u with pcp    Hyperlipidemia  -  -Continue Lipitor 80 Mg nightly    DVT prophylaxis: Lovenox sq  PT OT: Consulted  Disposition: Awaiting placement for [facility] rehab    Consultations:   IP CONSULT TO TRAUMA SURGERY  IP CONSULT TO NEUROSURGERY  IP CONSULT TO NEUROCRITICAL CARE  IP CONSULT TO NEUROSURGERY  IP CONSULT TO STROKE TEAM  IP CONSULT TO PHYSICAL MEDICINE REHAB  IP CONSULT TO ENDOVASCULAR NEUROSURGERY  IP CONSULT TO INTERNAL MEDICINE  IP CONSULT TO VASCULAR ACCESS TEAM    Patient is admitted as inpatient status because of co-morbidities listed above, severity of signs and symptoms as outlined, requirement for current medical therapies and most importantly because of direct risk to patient if care not provided in a hospital setting.    Janene Ybarra MD  Neurology Resident PGY-2  1/15/2025  10:41 AM

## 2025-01-15 NOTE — DISCHARGE INSTR - COC
Continuity of Care Form    Patient Name: Lanny Jaimes   :  1955  MRN:  [id number]    Admit date:  2025  Discharge date:  ***    Code Status Order: Full Code   Advance Directives:   Advance Care Flowsheet Documentation             Admitting Physician:  Antwan Carranza MD  PCP: No primary care provider on file.    Discharging Nurse: ***  Discharging Hospital Unit/Room#: 0139/0139-01  Discharging Unit Phone Number: ***    Emergency Contact:   Extended Emergency Contact Information  Primary Emergency Contact: kim Friedman  Mobile Phone: [phone]  Relation: Child  Preferred language: English   needed? No  Secondary Emergency Contact: Lavinia Blanc  Mobile Phone: [phone]  Relation: Friend  Preferred language: English   needed? No    Past Surgical History:  No past surgical history on file.    Immunization History:   Immunization History   Administered Date(s) Administered    COVID-19, J&J, (age 18y+), IM, 0.5 mL 03/10/2021    TDaP, ADACEL (age 10y-64y), BOOSTRIX (age 10y+), IM, 0.5mL 2025       Active Problems:  Patient Active Problem List   Diagnosis Code    Focal hemorrhagic contusion of cerebrum S06.33AA    Acute intracranial hemorrhage (HCC) I62.9    Cerebral amyloid angiopathy (HCC) E85.4, I68.0    Acquired hypothyroidism E03.9    Primary hypertension I10    Fall W19.XXXA    Aneurysm of cavernous portion of left internal carotid artery I67.1    Hypertensive urgency I16.0       Isolation/Infection:   Isolation            No Isolation          Patient Infection Status       None to display            Nurse Assessment:  Last Vital Signs: BP (!) 143/74   Pulse 62   Temp 97 °F (36.1 °C) (Axillary)   Resp 16   Ht 1.702 m (5' 7.01\")   Wt 67.6 kg (149 lb)   SpO2 96%   BMI 23.33 kg/m²     Last documented pain score (0-10 scale): Pain Level: 0  Last Weight:   Wt Readings from Last 1 Encounters:   25 67.6 kg (149 lb)     Mental Status:  {IP PT MENTAL

## 2025-01-15 NOTE — PROGRESS NOTES
Physical Therapy  Facility/Department: [address] STEPDOWN   Physical Therapy Daily Treatment Note    Patient Name: Lanny Jaimes        MRN: [id number]    : 1955    Date of Service: 1/15/2025    Chief Complaint   Patient presents with    Fall     Frequent falls     Altered Mental Status     Past Medical History:  has a past medical history of Thyroid disease.  Past Surgical History:  has no past surgical history on file.    Discharge Recommendations  Discharge Recommendations: Patient able to tolerate 3 hours of therapy per day  Further therapy recommended at discharge.The patient should be able to tolerate at least 3 hours of therapy per day over 5 days or 15 hours over 7 days.   This patient may benefit from a Physical Medicine and Rehab consult.   PT Equipment Recommendations  Equipment Needed: Yes  Mobility Devices: Walker  Walker: Rolling    Assessment  Body Structures, Functions, Activity Limitations Requiring Skilled Therapeutic Intervention: Decreased functional mobility ;Decreased endurance;Decreased balance;Decreased strength;Decreased coordination;Decreased cognition  Assessment: Patient presents with mobility, endurance and balance deficits. Patient required ModA for functional transfers. Bed mobility completed with ModA, HOB flat. Patient demo deficits in static standing balance tolerance and decline in insights demo by premature sitting without writer readiness, making ambulation unsafe to attempt this treatment. Patient most limited by deficits in cognition, balance, strength, coordination and mobility. Patient would benefit from a RW and 24 hour assistance with mobility upon discharge. Patient would benefit from additional intensive skilled therapy upon discharge to further reduce fall risk.  Therapy Prognosis: Good  Activity Tolerance  Activity Tolerance: Patient limited by endurance;Treatment limited secondary to decreased cognition;Patient limited by fatigue  Safety Devices  Type of Devices:  to right laterally for better aligment prior to sit->supine. ModA required for sit->supine for trunk support and BLEs. HOB then elevated, patient positioned for comfort with bed alarm activated. Call light and tray table within reach at therapist exit.  Transfers  Sit to Stand: Moderate Assistance  Stand to Sit: Moderate Assistance  Bed to Chair: Moderate assistance  Comment: Transfers completed from recliner x6 trials total with use of RW. Constant verbal and tactile cues for proper hand placement and sequencing with poor carryover. Patient with unsteadiness when coming up to stand, requiring verbal and tactile cues for upright and head up standing. Patient demo x1 premature sitting without warning, requiring MaxA for controlled descend into recliner chair. Therapist provided extensive education regarding safety awareness, informing therapist when patient needs to sit prior to sitting. Patient reported \"its too much drama.\" Additonal transfers completed with use of RW, ModA throughout, with constant cues and question as patient unreliant with verbalization of fatigue/needing to sit. Patient with deficits in static standing tolerance this treatment compared to previous, tolerating range from 10-30 seconds. Bed->chair transfer completed with ModA, ~5 ft total. Patient with unsteadiness throughout, unable to perform RW management this treatment, requiring therapist to mobilize RW. Patient with difficulty turning, requiring therapist to provide constant verbal, tactile and physical guidance at hips to promote turning motion. Patient attempted to sit premature, requiring MaxA from therapist to prevent sitting, constant cues and assist to complete turn, then ModA for controlled descend onto bed. Patient with reports of \"too much drama\" once seated EOB.    Balance   Balance  Posture: Fair  Sitting - Static: Fair;+  Sitting - Dynamic: Fair  Standing - Static: Poor;+  Standing - Dynamic: Poor;+  Comments: Sitting balance

## 2025-01-16 LAB
ANION GAP SERPL CALCULATED.3IONS-SCNC: 11 MMOL/L (ref 9–16)
BASOPHILS # BLD: 0.08 K/UL (ref 0–0.2)
BASOPHILS NFR BLD: 1 % (ref 0–2)
BUN SERPL-MCNC: 33 MG/DL (ref 8–23)
CALCIUM SERPL-MCNC: 8.7 MG/DL (ref 8.6–10.4)
CHLORIDE SERPL-SCNC: 110 MMOL/L (ref 98–107)
CO2 SERPL-SCNC: 21 MMOL/L (ref 20–31)
CREAT SERPL-MCNC: 0.7 MG/DL (ref 0.6–0.9)
EOSINOPHIL # BLD: 0.21 K/UL (ref 0–0.44)
EOSINOPHILS RELATIVE PERCENT: 2 % (ref 1–4)
ERYTHROCYTE [DISTWIDTH] IN BLOOD BY AUTOMATED COUNT: 16.7 % (ref 11.8–14.4)
GFR, ESTIMATED: >90 ML/MIN/1.73M2
GLUCOSE SERPL-MCNC: 107 MG/DL (ref 74–99)
HCT VFR BLD AUTO: 36 % (ref 36.3–47.1)
HGB BLD-MCNC: 11.5 G/DL (ref 11.9–15.1)
IMM GRANULOCYTES # BLD AUTO: 0.1 K/UL (ref 0–0.3)
IMM GRANULOCYTES NFR BLD: 1 %
LYMPHOCYTES NFR BLD: 1.81 K/UL (ref 1.1–3.7)
LYMPHOCYTES RELATIVE PERCENT: 19 % (ref 24–43)
MCH RBC QN AUTO: 30.3 PG (ref 25.2–33.5)
MCHC RBC AUTO-ENTMCNC: 31.9 G/DL (ref 28.4–34.8)
MCV RBC AUTO: 95 FL (ref 82.6–102.9)
MONOCYTES NFR BLD: 0.83 K/UL (ref 0.1–1.2)
MONOCYTES NFR BLD: 9 % (ref 3–12)
NEUTROPHILS NFR BLD: 68 % (ref 36–65)
NEUTS SEG NFR BLD: 6.77 K/UL (ref 1.5–8.1)
NRBC BLD-RTO: 0 PER 100 WBC
PLATELET # BLD AUTO: 330 K/UL (ref 138–453)
PMV BLD AUTO: 9.5 FL (ref 8.1–13.5)
POTASSIUM SERPL-SCNC: 3.8 MMOL/L (ref 3.7–5.3)
RBC # BLD AUTO: 3.79 M/UL (ref 3.95–5.11)
RBC # BLD: ABNORMAL 10*6/UL
SODIUM SERPL-SCNC: 142 MMOL/L (ref 136–145)
WBC OTHER # BLD: 9.8 K/UL (ref 3.5–11.3)

## 2025-01-16 PROCEDURE — 6370000000 HC RX 637 (ALT 250 FOR IP): Performed by: PSYCHIATRY & NEUROLOGY

## 2025-01-16 PROCEDURE — 2060000000 HC ICU INTERMEDIATE R&B

## 2025-01-16 PROCEDURE — 36415 COLL VENOUS BLD VENIPUNCTURE: CPT

## 2025-01-16 PROCEDURE — 6370000000 HC RX 637 (ALT 250 FOR IP)

## 2025-01-16 PROCEDURE — 99232 SBSQ HOSP IP/OBS MODERATE 35: CPT | Performed by: STUDENT IN AN ORGANIZED HEALTH CARE EDUCATION/TRAINING PROGRAM

## 2025-01-16 PROCEDURE — 80048 BASIC METABOLIC PNL TOTAL CA: CPT

## 2025-01-16 PROCEDURE — 6360000002 HC RX W HCPCS

## 2025-01-16 PROCEDURE — 85025 COMPLETE CBC W/AUTO DIFF WBC: CPT

## 2025-01-16 PROCEDURE — 2500000003 HC RX 250 WO HCPCS

## 2025-01-16 PROCEDURE — 51798 US URINE CAPACITY MEASURE: CPT

## 2025-01-16 RX ORDER — LISINOPRIL 20 MG/1
40 TABLET ORAL DAILY
Status: CANCELLED | OUTPATIENT
Start: 2025-01-17

## 2025-01-16 RX ORDER — ACETAMINOPHEN 650 MG/1
650 SUPPOSITORY RECTAL EVERY 6 HOURS PRN
Status: DISCONTINUED | OUTPATIENT
Start: 2025-01-16 | End: 2025-01-17 | Stop reason: HOSPADM

## 2025-01-16 RX ORDER — AMLODIPINE BESYLATE 10 MG/1
10 TABLET ORAL DAILY
Status: CANCELLED | OUTPATIENT
Start: 2025-01-17

## 2025-01-16 RX ORDER — ACETAMINOPHEN 650 MG/1
650 SUPPOSITORY RECTAL EVERY 6 HOURS PRN
Status: CANCELLED | OUTPATIENT
Start: 2025-01-16

## 2025-01-16 RX ORDER — HYDRALAZINE HYDROCHLORIDE 20 MG/ML
10 INJECTION INTRAMUSCULAR; INTRAVENOUS
Status: CANCELLED | OUTPATIENT
Start: 2025-01-16

## 2025-01-16 RX ORDER — SODIUM CHLORIDE 0.9 % (FLUSH) 0.9 %
5-40 SYRINGE (ML) INJECTION PRN
Status: CANCELLED | OUTPATIENT
Start: 2025-01-16

## 2025-01-16 RX ORDER — ENOXAPARIN SODIUM 100 MG/ML
40 INJECTION SUBCUTANEOUS DAILY
Status: CANCELLED | OUTPATIENT
Start: 2025-01-17

## 2025-01-16 RX ORDER — GINSENG 100 MG
CAPSULE ORAL PRN
Status: CANCELLED | OUTPATIENT
Start: 2025-01-16

## 2025-01-16 RX ORDER — ASPIRIN 81 MG/1
81 TABLET ORAL DAILY
Status: CANCELLED | OUTPATIENT
Start: 2025-01-17

## 2025-01-16 RX ORDER — CARVEDILOL 6.25 MG/1
6.25 TABLET ORAL 2 TIMES DAILY WITH MEALS
Status: CANCELLED | OUTPATIENT
Start: 2025-01-16

## 2025-01-16 RX ORDER — SODIUM CHLORIDE 0.9 % (FLUSH) 0.9 %
5-40 SYRINGE (ML) INJECTION EVERY 12 HOURS SCHEDULED
Status: CANCELLED | OUTPATIENT
Start: 2025-01-16

## 2025-01-16 RX ORDER — ACETAMINOPHEN 650 MG/1
650 SUPPOSITORY RECTAL EVERY 6 HOURS PRN
Status: DISCONTINUED | OUTPATIENT
Start: 2025-01-16 | End: 2025-01-16

## 2025-01-16 RX ORDER — LABETALOL HYDROCHLORIDE 5 MG/ML
10 INJECTION, SOLUTION INTRAVENOUS
Status: CANCELLED | OUTPATIENT
Start: 2025-01-16

## 2025-01-16 RX ORDER — ATORVASTATIN CALCIUM 40 MG/1
80 TABLET, FILM COATED ORAL NIGHTLY
Status: CANCELLED | OUTPATIENT
Start: 2025-01-16

## 2025-01-16 RX ORDER — ACETAMINOPHEN 500 MG
1000 TABLET ORAL EVERY 6 HOURS PRN
Status: DISCONTINUED | OUTPATIENT
Start: 2025-01-16 | End: 2025-01-16

## 2025-01-16 RX ORDER — LEVOTHYROXINE SODIUM 100 UG/1
100 TABLET ORAL DAILY
Status: CANCELLED | OUTPATIENT
Start: 2025-01-17

## 2025-01-16 RX ORDER — SODIUM CHLORIDE 9 MG/ML
INJECTION, SOLUTION INTRAVENOUS PRN
Status: CANCELLED | OUTPATIENT
Start: 2025-01-16

## 2025-01-16 RX ORDER — ACETAMINOPHEN 500 MG
1000 TABLET ORAL EVERY 8 HOURS PRN
Status: CANCELLED | OUTPATIENT
Start: 2025-01-16

## 2025-01-16 RX ORDER — ONDANSETRON 4 MG/1
4 TABLET, ORALLY DISINTEGRATING ORAL EVERY 8 HOURS PRN
Status: CANCELLED | OUTPATIENT
Start: 2025-01-16

## 2025-01-16 RX ORDER — ONDANSETRON 2 MG/ML
4 INJECTION INTRAMUSCULAR; INTRAVENOUS EVERY 6 HOURS PRN
Status: CANCELLED | OUTPATIENT
Start: 2025-01-16

## 2025-01-16 RX ORDER — HYDROCORTISONE ACETATE PRAMOXINE HCL 1; 1 G/100G; G/100G
CREAM TOPICAL 3 TIMES DAILY PRN
Status: CANCELLED | OUTPATIENT
Start: 2025-01-16

## 2025-01-16 RX ORDER — IBUPROFEN 400 MG/1
800 TABLET, FILM COATED ORAL EVERY 6 HOURS PRN
Status: CANCELLED | OUTPATIENT
Start: 2025-01-16

## 2025-01-16 RX ORDER — ACETAMINOPHEN 500 MG
1000 TABLET ORAL EVERY 8 HOURS PRN
Status: DISCONTINUED | OUTPATIENT
Start: 2025-01-16 | End: 2025-01-17 | Stop reason: HOSPADM

## 2025-01-16 RX ADMIN — LISINOPRIL 40 MG: 20 TABLET ORAL at 08:20

## 2025-01-16 RX ADMIN — CARVEDILOL 6.25 MG: 6.25 TABLET, FILM COATED ORAL at 08:21

## 2025-01-16 RX ADMIN — AMLODIPINE BESYLATE 10 MG: 10 TABLET ORAL at 08:21

## 2025-01-16 RX ADMIN — SODIUM CHLORIDE, PRESERVATIVE FREE 10 ML: 5 INJECTION INTRAVENOUS at 20:16

## 2025-01-16 RX ADMIN — LEVOTHYROXINE SODIUM 100 MCG: 100 TABLET ORAL at 08:21

## 2025-01-16 RX ADMIN — ATORVASTATIN CALCIUM 80 MG: 80 TABLET, FILM COATED ORAL at 20:13

## 2025-01-16 RX ADMIN — SODIUM CHLORIDE, PRESERVATIVE FREE 10 ML: 5 INJECTION INTRAVENOUS at 08:23

## 2025-01-16 RX ADMIN — ASPIRIN 81 MG: 81 TABLET, COATED ORAL at 08:20

## 2025-01-16 RX ADMIN — ENOXAPARIN SODIUM 40 MG: 100 INJECTION SUBCUTANEOUS at 08:21

## 2025-01-16 RX ADMIN — CARVEDILOL 6.25 MG: 6.25 TABLET, FILM COATED ORAL at 17:26

## 2025-01-16 ASSESSMENT — PAIN SCALES - GENERAL
PAINLEVEL_OUTOF10: 0

## 2025-01-16 NOTE — PROGRESS NOTES
[facility]   IN-PATIENT SERVICE   [facility]    Progress Note             Date:   1/16/2025  Patient name:  Lanny Jaimes  Date of admission:  1/7/2025  9:21 AM  MRN:   [id number]  Account:  [id number]  YOB: 1955  PCP:    No primary care provider on file.  Room:   [address] [place] [address]  Code Status:    Full Code    Chief Complaint:     Chief Complaint   Patient presents with    Fall     Frequent falls     Altered Mental Status       Interval hx:     The patient was seen and examined at bedside.  Is vitally stable, alert and oriented x 3.   No acute events overnight.   Complaining of bilateral mild knee.  Increased the dose of Tylenol to 1 g to be alternate with ibuprofen.  Awaiting acute rehab placement to [facility].  Pending authorization.      Brief History of Present Illness:     69-year-old female presented as a transfer from outside facility as CT head read as small left parietal hemorrhagic contusion after a fall at home.  There were initial concerns for traumatic hemorrhage per notes however unusual appearance on imaging for a traumatic injury. MRI more consistent with CAA.  Initial SBP 220s.  ICH score 0.  Was evaluated by trauma, no acute traumatic injuries and neurocritical care was consulted for admission to neuro ICU.  Patient son at bedside endorsed history of uncontrolled hypertension with noncompliance with hypertension medication for over a year.  Additionally endorses noncompliance with other medications including Synthroid.  Patient underwent thyroid radiation in 2000 for hyperthyroidism resulting in iatrogenic hypothyroidism and started on Synthroid.  Per son, patient's mentation is at her baseline stating \"she is always been like this.\"  However he does report her family reports a progressive worsening of cognitive decline and frequent falls over the past year.  On initial evaluation, patient exhibited psychomotor slowing with delayed responses to

## 2025-01-16 NOTE — PLAN OF CARE
Problem: Discharge Planning  Goal: Discharge to home or other facility with appropriate resources  1/16/2025 1708 by Agata Taveras RN  Outcome: Progressing  Flowsheets (Taken 1/16/2025 0800)  Discharge to home or other facility with appropriate resources: Identify barriers to discharge with patient and caregiver  1/16/2025 0602 by Clary Colon RN  Outcome: Progressing  Flowsheets (Taken 1/15/2025 2000)  Discharge to home or other facility with appropriate resources: Identify barriers to discharge with patient and caregiver     Problem: Neurosensory - Adult  Goal: Achieves stable or improved neurological status  1/16/2025 1708 by Agata Taveras RN  Outcome: Progressing  Flowsheets (Taken 1/16/2025 0800)  Achieves stable or improved neurological status: Assess for and report changes in neurological status  1/16/2025 0602 by Clary Colon RN  Outcome: Progressing  Flowsheets (Taken 1/15/2025 2000)  Achieves stable or improved neurological status: Assess for and report changes in neurological status  Goal: Absence of seizures  1/16/2025 1708 by Agata Taveras RN  Outcome: Progressing  Flowsheets (Taken 1/16/2025 0800)  Absence of seizures: Monitor for seizure activity.  If seizure occurs, document type and location of movements and any associated apnea  1/16/2025 0602 by Clary Colon RN  Outcome: Progressing  Flowsheets (Taken 1/15/2025 2000)  Absence of seizures: Monitor for seizure activity.  If seizure occurs, document type and location of movements and any associated apnea  Goal: Remains free of injury related to seizures activity  1/16/2025 1708 by Agata Taveras RN  Outcome: Progressing  Flowsheets (Taken 1/16/2025 0800)  Remains free of injury related to seizure activity: Maintain airway, patient safety  and administer oxygen as ordered  1/16/2025 0602 by Clary Colon RN  Outcome: Progressing  Flowsheets (Taken 1/15/2025 2000)  Remains free of injury related to seizure activity: Maintain airway, patient  activity tolerance for standing, transferring and ambulating with or without assistive devices  Goal: Maintain proper alignment of affected body part  1/16/2025 1708 by Agata Taveras RN  Outcome: Progressing  Flowsheets (Taken 1/16/2025 0800)  Maintain proper alignment of affected body part: Support and protect limb and body alignment per provider's orders  1/16/2025 0602 by Clary Colon RN  Outcome: Progressing  Flowsheets (Taken 1/15/2025 2000)  Maintain proper alignment of affected body part: Support and protect limb and body alignment per provider's orders  Goal: Return ADL status to a safe level of function  1/16/2025 1708 by Agata Taveras RN  Outcome: Progressing  Flowsheets (Taken 1/16/2025 0800)  Return ADL Status to a Safe Level of Function: Administer medication as ordered  1/16/2025 0602 by Clary Colon RN  Outcome: Progressing  Flowsheets (Taken 1/15/2025 2000)  Return ADL Status to a Safe Level of Function: Administer medication as ordered     Problem: Gastrointestinal - Adult  Goal: Minimal or absence of nausea and vomiting  1/16/2025 1708 by Agata Taveras RN  Outcome: Progressing  Flowsheets (Taken 1/16/2025 0800)  Minimal or absence of nausea and vomiting: Maintain NPO status until nausea and vomiting are resolved  1/16/2025 0602 by Clary Colon RN  Outcome: Progressing  Flowsheets (Taken 1/15/2025 2000)  Minimal or absence of nausea and vomiting: Administer IV fluids as ordered to ensure adequate hydration  Goal: Maintains or returns to baseline bowel function  1/16/2025 1708 by Agata Taveras RN  Outcome: Progressing  Flowsheets (Taken 1/16/2025 0800)  Maintains or returns to baseline bowel function:   Assess bowel function   Administer IV fluids as ordered to ensure adequate hydration  1/16/2025 0602 by Clary Colon RN  Outcome: Progressing  Flowsheets (Taken 1/15/2025 2000)  Maintains or returns to baseline bowel function: Assess bowel function  Goal: Maintains adequate nutritional

## 2025-01-16 NOTE — PROGRESS NOTES
Endovascular Neurosurgery Progress Note      Reason for evaluation: Small left parietal hyperdensity on CT head, diffuse microhemorrhages on GRE MRI     SUBJECTIVE:     NAEO from EVN perspective. Steady neurological improvements continue. Discharge planning in progress, pending rehab. Bp management per primary team.      History of Chief Complaint:      69 year old male with a history of hypertension, hyperlipidemia, hypothyroidism noncompliance with medications presenting with altered mentation that resolved, found to have small left parietal possible ICH-hypertensive CT head.  MRI brain showed underlying hypertensive angiopathy.  Patient has multiple microhemorrhages.  Patient currently back to baseline with strict blood pressure. CTA showed . 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 2 .      Allergies  is allergic to pcn [penicillins].  Medications  Prior to Admission medications    Medication Sig Start Date End Date Taking? Authorizing Provider   levothyroxine (SYNTHROID) 88 MCG tablet Take 1 tablet by mouth Daily    Provider, MD Mohinder    Scheduled Meds:   carvedilol  6.25 mg Oral BID WC    levothyroxine  100 mcg Oral Daily    lisinopril  40 mg Oral Daily    aspirin  81 mg Oral Daily    atorvastatin  80 mg Oral Nightly    enoxaparin  40 mg SubCUTAneous Daily    amLODIPine  10 mg Oral Daily    sodium chloride flush  5-40 mL IntraVENous 2 times per day     Continuous Infusions:   sodium chloride      sodium chloride       PRN Meds:.ibuprofen, hydrocortisone ace-pramoxine, hydrALAZINE, labetalol, sodium chloride, ondansetron **OR** ondansetron, bacitracin, sodium chloride flush, sodium chloride, acetaminophen **OR** acetaminophen  Past Medical History   has a past medical history of Thyroid disease.  Past Surgical History   has no past surgical history on file.  Social History   reports that she has never smoked. She does not have any smokeless tobacco

## 2025-01-16 NOTE — PLAN OF CARE
Problem: Discharge Planning  Goal: Discharge to home or other facility with appropriate resources  Outcome: Progressing  Flowsheets (Taken 1/15/2025 2000)  Discharge to home or other facility with appropriate resources: Identify barriers to discharge with patient and caregiver     Problem: Neurosensory - Adult  Goal: Achieves stable or improved neurological status  Outcome: Progressing  Flowsheets (Taken 1/15/2025 2000)  Achieves stable or improved neurological status: Assess for and report changes in neurological status  Goal: Absence of seizures  Outcome: Progressing  Flowsheets (Taken 1/15/2025 2000)  Absence of seizures: Monitor for seizure activity.  If seizure occurs, document type and location of movements and any associated apnea  Goal: Remains free of injury related to seizures activity  Outcome: Progressing  Flowsheets (Taken 1/15/2025 2000)  Remains free of injury related to seizure activity: Maintain airway, patient safety  and administer oxygen as ordered  Goal: Achieves maximal functionality and self care  Outcome: Progressing  Flowsheets (Taken 1/15/2025 2000)  Achieves maximal functionality and self care: Monitor swallowing and airway patency with patient fatigue and changes in neurological status

## 2025-01-17 ENCOUNTER — HOSPITAL ENCOUNTER (INPATIENT)
Age: 70
LOS: 13 days | Discharge: NURSING, REHAB OR LONG-TERM CARE | DRG: 949 | End: 2025-01-30
Attending: PHYSICAL MEDICINE & REHABILITATION | Admitting: PHYSICAL MEDICINE & REHABILITATION
Payer: MEDICARE

## 2025-01-17 VITALS
TEMPERATURE: 98.3 F | SYSTOLIC BLOOD PRESSURE: 144 MMHG | HEART RATE: 61 BPM | DIASTOLIC BLOOD PRESSURE: 43 MMHG | BODY MASS INDEX: 23.39 KG/M2 | WEIGHT: 149 LBS | OXYGEN SATURATION: 94 % | HEIGHT: 67 IN | RESPIRATION RATE: 15 BRPM

## 2025-01-17 PROBLEM — G93.40 ENCEPHALOPATHY: Status: ACTIVE | Noted: 2025-01-17

## 2025-01-17 LAB
ANION GAP SERPL CALCULATED.3IONS-SCNC: 11 MMOL/L (ref 9–16)
BASOPHILS # BLD: 0.08 K/UL (ref 0–0.2)
BASOPHILS NFR BLD: 1 % (ref 0–2)
BUN SERPL-MCNC: 35 MG/DL (ref 8–23)
CALCIUM SERPL-MCNC: 8.8 MG/DL (ref 8.6–10.4)
CHLORIDE SERPL-SCNC: 109 MMOL/L (ref 98–107)
CO2 SERPL-SCNC: 23 MMOL/L (ref 20–31)
CREAT SERPL-MCNC: 0.7 MG/DL (ref 0.6–0.9)
EOSINOPHIL # BLD: 0.21 K/UL (ref 0–0.44)
EOSINOPHILS RELATIVE PERCENT: 2 % (ref 1–4)
ERYTHROCYTE [DISTWIDTH] IN BLOOD BY AUTOMATED COUNT: 16.4 % (ref 11.8–14.4)
GFR, ESTIMATED: >90 ML/MIN/1.73M2
GLUCOSE SERPL-MCNC: 103 MG/DL (ref 74–99)
HCT VFR BLD AUTO: 37.3 % (ref 36.3–47.1)
HGB BLD-MCNC: 11.6 G/DL (ref 11.9–15.1)
IMM GRANULOCYTES # BLD AUTO: 0.08 K/UL (ref 0–0.3)
IMM GRANULOCYTES NFR BLD: 1 %
LYMPHOCYTES NFR BLD: 1.9 K/UL (ref 1.1–3.7)
LYMPHOCYTES RELATIVE PERCENT: 18 % (ref 24–43)
MCH RBC QN AUTO: 29.5 PG (ref 25.2–33.5)
MCHC RBC AUTO-ENTMCNC: 31.1 G/DL (ref 28.4–34.8)
MCV RBC AUTO: 94.9 FL (ref 82.6–102.9)
MONOCYTES NFR BLD: 0.82 K/UL (ref 0.1–1.2)
MONOCYTES NFR BLD: 8 % (ref 3–12)
NEUTROPHILS NFR BLD: 70 % (ref 36–65)
NEUTS SEG NFR BLD: 7.49 K/UL (ref 1.5–8.1)
NRBC BLD-RTO: 0 PER 100 WBC
PLATELET # BLD AUTO: 336 K/UL (ref 138–453)
PMV BLD AUTO: 9.8 FL (ref 8.1–13.5)
POTASSIUM SERPL-SCNC: 3.8 MMOL/L (ref 3.7–5.3)
RBC # BLD AUTO: 3.93 M/UL (ref 3.95–5.11)
RBC # BLD: ABNORMAL 10*6/UL
SODIUM SERPL-SCNC: 143 MMOL/L (ref 136–145)
WBC OTHER # BLD: 10.6 K/UL (ref 3.5–11.3)

## 2025-01-17 PROCEDURE — 99239 HOSP IP/OBS DSCHRG MGMT >30: CPT | Performed by: STUDENT IN AN ORGANIZED HEALTH CARE EDUCATION/TRAINING PROGRAM

## 2025-01-17 PROCEDURE — 36415 COLL VENOUS BLD VENIPUNCTURE: CPT

## 2025-01-17 PROCEDURE — 85025 COMPLETE CBC W/AUTO DIFF WBC: CPT

## 2025-01-17 PROCEDURE — 97129 THER IVNTJ 1ST 15 MIN: CPT

## 2025-01-17 PROCEDURE — 1180000000 HC REHAB R&B

## 2025-01-17 PROCEDURE — 6370000000 HC RX 637 (ALT 250 FOR IP)

## 2025-01-17 PROCEDURE — 80048 BASIC METABOLIC PNL TOTAL CA: CPT

## 2025-01-17 PROCEDURE — 97535 SELF CARE MNGMENT TRAINING: CPT

## 2025-01-17 PROCEDURE — 97530 THERAPEUTIC ACTIVITIES: CPT

## 2025-01-17 PROCEDURE — 99212 OFFICE O/P EST SF 10 MIN: CPT

## 2025-01-17 PROCEDURE — 97110 THERAPEUTIC EXERCISES: CPT

## 2025-01-17 PROCEDURE — 6360000002 HC RX W HCPCS

## 2025-01-17 PROCEDURE — 2500000003 HC RX 250 WO HCPCS

## 2025-01-17 PROCEDURE — 92507 TX SP LANG VOICE COMM INDIV: CPT

## 2025-01-17 PROCEDURE — 6370000000 HC RX 637 (ALT 250 FOR IP): Performed by: PSYCHIATRY & NEUROLOGY

## 2025-01-17 RX ORDER — ENOXAPARIN SODIUM 100 MG/ML
40 INJECTION SUBCUTANEOUS DAILY
Status: DISCONTINUED | OUTPATIENT
Start: 2025-01-17 | End: 2025-01-30 | Stop reason: HOSPADM

## 2025-01-17 RX ORDER — POLYETHYLENE GLYCOL 3350 17 G/17G
17 POWDER, FOR SOLUTION ORAL DAILY
Status: DISCONTINUED | OUTPATIENT
Start: 2025-01-17 | End: 2025-01-30 | Stop reason: HOSPADM

## 2025-01-17 RX ORDER — BISACODYL 10 MG
10 SUPPOSITORY, RECTAL RECTAL DAILY PRN
Status: DISCONTINUED | OUTPATIENT
Start: 2025-01-17 | End: 2025-01-30 | Stop reason: HOSPADM

## 2025-01-17 RX ORDER — ACETAMINOPHEN 650 MG/1
650 SUPPOSITORY RECTAL EVERY 6 HOURS PRN
Status: DISCONTINUED | OUTPATIENT
Start: 2025-01-17 | End: 2025-01-20

## 2025-01-17 RX ORDER — IBUPROFEN 800 MG/1
800 TABLET, FILM COATED ORAL EVERY 6 HOURS PRN
Status: DISCONTINUED | OUTPATIENT
Start: 2025-01-17 | End: 2025-01-30 | Stop reason: HOSPADM

## 2025-01-17 RX ORDER — BENZOCAINE/MENTHOL 6 MG-10 MG
LOZENGE MUCOUS MEMBRANE 3 TIMES DAILY PRN
Status: DISCONTINUED | OUTPATIENT
Start: 2025-01-17 | End: 2025-01-30 | Stop reason: HOSPADM

## 2025-01-17 RX ORDER — AMLODIPINE BESYLATE 10 MG/1
10 TABLET ORAL DAILY
Status: DISCONTINUED | OUTPATIENT
Start: 2025-01-17 | End: 2025-01-30 | Stop reason: HOSPADM

## 2025-01-17 RX ORDER — ATORVASTATIN CALCIUM 80 MG/1
80 TABLET, FILM COATED ORAL NIGHTLY
Status: DISCONTINUED | OUTPATIENT
Start: 2025-01-17 | End: 2025-01-30 | Stop reason: HOSPADM

## 2025-01-17 RX ORDER — ACETAMINOPHEN 325 MG/1
650 TABLET ORAL EVERY 4 HOURS PRN
Status: CANCELLED | OUTPATIENT
Start: 2025-01-17

## 2025-01-17 RX ORDER — SENNOSIDES A AND B 8.6 MG/1
2 TABLET, FILM COATED ORAL DAILY PRN
Status: CANCELLED | OUTPATIENT
Start: 2025-01-17

## 2025-01-17 RX ORDER — POLYETHYLENE GLYCOL 3350 17 G/17G
17 POWDER, FOR SOLUTION ORAL DAILY
Status: DISCONTINUED | OUTPATIENT
Start: 2025-01-17 | End: 2025-01-17 | Stop reason: HOSPADM

## 2025-01-17 RX ORDER — ACETAMINOPHEN 500 MG
1000 TABLET ORAL EVERY 8 HOURS PRN
Status: DISCONTINUED | OUTPATIENT
Start: 2025-01-17 | End: 2025-01-20

## 2025-01-17 RX ORDER — LISINOPRIL 20 MG/1
40 TABLET ORAL DAILY
Status: DISCONTINUED | OUTPATIENT
Start: 2025-01-17 | End: 2025-01-30 | Stop reason: HOSPADM

## 2025-01-17 RX ORDER — ASPIRIN 81 MG/1
81 TABLET ORAL DAILY
Status: DISCONTINUED | OUTPATIENT
Start: 2025-01-17 | End: 2025-01-30 | Stop reason: HOSPADM

## 2025-01-17 RX ORDER — CARVEDILOL 6.25 MG/1
6.25 TABLET ORAL 2 TIMES DAILY WITH MEALS
Status: DISCONTINUED | OUTPATIENT
Start: 2025-01-18 | End: 2025-01-30 | Stop reason: HOSPADM

## 2025-01-17 RX ORDER — ONDANSETRON 4 MG/1
4 TABLET, ORALLY DISINTEGRATING ORAL EVERY 8 HOURS PRN
Status: DISCONTINUED | OUTPATIENT
Start: 2025-01-17 | End: 2025-01-30 | Stop reason: HOSPADM

## 2025-01-17 RX ORDER — ACETAMINOPHEN 325 MG/1
650 TABLET ORAL EVERY 4 HOURS PRN
Status: DISCONTINUED | OUTPATIENT
Start: 2025-01-17 | End: 2025-01-30 | Stop reason: HOSPADM

## 2025-01-17 RX ORDER — BISACODYL 10 MG
10 SUPPOSITORY, RECTAL RECTAL DAILY PRN
Status: CANCELLED | OUTPATIENT
Start: 2025-01-17

## 2025-01-17 RX ORDER — GINSENG 100 MG
CAPSULE ORAL PRN
Status: DISCONTINUED | OUTPATIENT
Start: 2025-01-17 | End: 2025-01-30 | Stop reason: HOSPADM

## 2025-01-17 RX ORDER — LEVOTHYROXINE SODIUM 100 UG/1
100 TABLET ORAL DAILY
Status: DISCONTINUED | OUTPATIENT
Start: 2025-01-18 | End: 2025-01-25

## 2025-01-17 RX ORDER — SENNOSIDES A AND B 8.6 MG/1
2 TABLET, FILM COATED ORAL DAILY PRN
Status: DISCONTINUED | OUTPATIENT
Start: 2025-01-17 | End: 2025-01-30 | Stop reason: HOSPADM

## 2025-01-17 RX ORDER — ONDANSETRON 2 MG/ML
4 INJECTION INTRAMUSCULAR; INTRAVENOUS EVERY 6 HOURS PRN
Status: DISCONTINUED | OUTPATIENT
Start: 2025-01-17 | End: 2025-01-20

## 2025-01-17 RX ORDER — POLYETHYLENE GLYCOL 3350 17 G/17G
17 POWDER, FOR SOLUTION ORAL DAILY
Status: CANCELLED | OUTPATIENT
Start: 2025-01-17

## 2025-01-17 RX ADMIN — ASPIRIN 81 MG: 81 TABLET, COATED ORAL at 08:35

## 2025-01-17 RX ADMIN — AMLODIPINE BESYLATE 10 MG: 10 TABLET ORAL at 08:34

## 2025-01-17 RX ADMIN — CARVEDILOL 6.25 MG: 6.25 TABLET, FILM COATED ORAL at 18:00

## 2025-01-17 RX ADMIN — ATORVASTATIN CALCIUM 80 MG: 80 TABLET, FILM COATED ORAL at 20:31

## 2025-01-17 RX ADMIN — POLYETHYLENE GLYCOL 3350 17 G: 17 POWDER, FOR SOLUTION ORAL at 09:59

## 2025-01-17 RX ADMIN — LEVOTHYROXINE SODIUM 100 MCG: 100 TABLET ORAL at 08:35

## 2025-01-17 RX ADMIN — SODIUM CHLORIDE, PRESERVATIVE FREE 10 ML: 5 INJECTION INTRAVENOUS at 08:36

## 2025-01-17 RX ADMIN — CARVEDILOL 6.25 MG: 6.25 TABLET, FILM COATED ORAL at 08:34

## 2025-01-17 RX ADMIN — BACITRACIN: 500 OINTMENT TOPICAL at 12:44

## 2025-01-17 RX ADMIN — LISINOPRIL 40 MG: 20 TABLET ORAL at 08:34

## 2025-01-17 RX ADMIN — ENOXAPARIN SODIUM 40 MG: 100 INJECTION SUBCUTANEOUS at 08:36

## 2025-01-17 ASSESSMENT — PAIN SCALES - GENERAL
PAINLEVEL_OUTOF10: 0

## 2025-01-17 NOTE — PLAN OF CARE
Problem: Discharge Planning  Goal: Discharge to home or other facility with appropriate resources  1/17/2025 0606 by Clary Colon RN  Outcome: Progressing  Flowsheets (Taken 1/16/2025 2000)  Discharge to home or other facility with appropriate resources: Identify barriers to discharge with patient and caregiver  1/16/2025 1708 by Agata Taveras RN  Outcome: Progressing  Flowsheets (Taken 1/16/2025 0800)  Discharge to home or other facility with appropriate resources: Identify barriers to discharge with patient and caregiver     Problem: Neurosensory - Adult  Goal: Achieves stable or improved neurological status  1/17/2025 0606 by Clary Colon RN  Outcome: Progressing  Flowsheets (Taken 1/16/2025 2000)  Achieves stable or improved neurological status: Assess for and report changes in neurological status  1/16/2025 1708 by Agata Taveras RN  Outcome: Progressing  Flowsheets (Taken 1/16/2025 0800)  Achieves stable or improved neurological status: Assess for and report changes in neurological status  Goal: Absence of seizures  1/17/2025 0606 by Clary Colon RN  Outcome: Progressing  Flowsheets (Taken 1/16/2025 2000)  Absence of seizures: Monitor for seizure activity.  If seizure occurs, document type and location of movements and any associated apnea  1/16/2025 1708 by Agata Taveras RN  Outcome: Progressing  Flowsheets (Taken 1/16/2025 0800)  Absence of seizures: Monitor for seizure activity.  If seizure occurs, document type and location of movements and any associated apnea  Goal: Remains free of injury related to seizures activity  1/17/2025 0606 by Clary Colon RN  Outcome: Progressing  Flowsheets (Taken 1/16/2025 2000)  Remains free of injury related to seizure activity: Maintain airway, patient safety  and administer oxygen as ordered  1/16/2025 1708 by Agata Taveras RN  Outcome: Progressing  Flowsheets (Taken 1/16/2025 0800)  Remains free of injury related to seizure activity: Maintain airway, patient

## 2025-01-17 NOTE — DISCHARGE SUMMARY
[facility]     Department of Neurology    INPATIENT DISCHARGE SUMMARY        Patient Identification:  Lanny Jaimes is a 69 y.o. female.  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 *       Consults:   Neuroendovasular surgery    Brief Inpatient course:    69-year-old female presented as a transfer from outside facility as CT head read as small left parietal hemorrhagic contusion after a fall at home. There were initial concerns for traumatic hemorrhage per notes however unusual appearance on imaging for a traumatic injury. MRI more consistent with hypertensive microangiopathy. Initial SBP 220s. ICH score 0. Was evaluated by trauma, no acute traumatic injuries and neurocritical care was consulted for admission to neuro ICU. Initially required Cardene to meet blood pressure goal of less than 160. Was restarted on her home Synthroid and started on 5 Norvasc and transferred to Neuro step down after a day.    Repeat CT head showed stable scan.Repeat CTA head and neck showed left ICA cavernous aneurysm.  NEV was consulted, did DSA 2025 which showed Right P-comm infundibulum  and Left cavernous medially pointing saccular aneurysm measuring 10.73 mm W x 9.92 mm L x 9.64 mm H x 7 mm N with a parent vessel diameter measuring around 5 mm.  They plan for

## 2025-01-17 NOTE — PROGRESS NOTES
Occupational Therapy Daily Treatment Note  Facility/Department: [address] [facility]   Patient Name: Lanny Jaimes        MRN: [id number]    : 1955    Date of Service: 2025    Chief Complaint   Patient presents with    Fall     Frequent falls     Altered Mental Status     Past Medical History:  has a past medical history of Thyroid disease.  Past Surgical History:  has no past surgical history on file.    Discharge Recommendations  Discharge Recommendations: Patient would benefit from continued therapy after discharge, Patient able to tolerate 3 hours of therapy per day     Assessment  Performance deficits / Impairments: Decreased functional mobility ;Decreased ADL status;Decreased safe awareness;Decreased endurance;Decreased balance;Decreased cognition;Decreased high-level IADLs  Assessment: pt demonstrated above deficits impacting occupational performance. pt exhibited increased ability to maintain attention to task this date with improved ability to redirect, but continued to required min-mod cues throughout session. pt would benefit from contiued acute Ot, as well as at discharge in order to increase safety and independence.  Prognosis: Good  Decision Making: Medium Complexity  REQUIRES OT FOLLOW-UP: Yes  Activity Tolerance  Activity Tolerance: Patient Tolerated treatment well;Patient limited by fatigue  Activity Tolerance Comments: pt fatigues quickly during ADL completion and standing activities  Safety Devices  Type of Devices: Call light within reach;Patient at risk for falls;Nurse notified;Gait belt;Left in chair;Chair alarm in place  Restraints  Restraints Initially in Place: No    AM-PAC  AM-PAC Daily Activity - Inpatient   How much help is needed for putting on and taking off regular lower body clothing?: A Little  How much help is needed for bathing (which includes washing, rinsing, drying)?: A Little  How much help is needed for toileting (which includes using toilet, bedpan, or urinal)?: A

## 2025-01-17 NOTE — CARE COORDINATION
Transitional Planning     Auth has been received for [facility], facility is requesting 7:30  time.  Transportation request for 7pm faxed to [facility].

## 2025-01-17 NOTE — PROGRESS NOTES
Endovascular Neurosurgery Progress Note      Reason for evaluation: Small left parietal hyperdensity on CT head, diffuse microhemorrhages on GRE MRI     SUBJECTIVE:     NAEO from EVN perspective. Exam unchanged from yesterday, with ongoing confusion. Working with therapy.      History of Chief Complaint:      69 year old male with a history of hypertension, hyperlipidemia, hypothyroidism noncompliance with medications presenting with altered mentation that resolved, found to have small left parietal possible ICH-hypertensive CT head.  MRI brain showed underlying hypertensive angiopathy.  Patient has multiple microhemorrhages.  Patient currently back to baseline with strict blood pressure. CTA showed . 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 2 .      Allergies  is allergic to pcn [penicillins].  Medications  Prior to Admission medications    Medication Sig Start Date End Date Taking? Authorizing Provider   levothyroxine (SYNTHROID) 88 MCG tablet Take 1 tablet by mouth Daily    Provider, MD Mohinder    Scheduled Meds:   carvedilol  6.25 mg Oral BID WC    levothyroxine  100 mcg Oral Daily    lisinopril  40 mg Oral Daily    aspirin  81 mg Oral Daily    atorvastatin  80 mg Oral Nightly    enoxaparin  40 mg SubCUTAneous Daily    amLODIPine  10 mg Oral Daily    sodium chloride flush  5-40 mL IntraVENous 2 times per day     Continuous Infusions:   sodium chloride      sodium chloride       PRN Meds:.acetaminophen **OR** acetaminophen, ibuprofen, hydrocortisone ace-pramoxine, hydrALAZINE, labetalol, sodium chloride, ondansetron **OR** ondansetron, bacitracin, sodium chloride flush, sodium chloride  Past Medical History   has a past medical history of Thyroid disease.  Past Surgical History   has no past surgical history on file.  Social History   reports that she has never smoked. She does not have any smokeless tobacco history on file.   reports that she does not

## 2025-01-17 NOTE — CARE COORDINATION
sequencing with poor carryover. Patient with unsteadiness when coming up to stand, requiring verbal and tactile cues for upright and head up standing. Patient demo x1 premature sitting without warning, requiring MaxA for controlled descend into recliner chair. Therapist provided extensive education regarding safety awareness, informing therapist when patient needs to sit prior to sitting. Patient reported \"its too much drama.\" Additonal transfers completed with use of RW, ModA throughout, with constant cues and question as patient unreliant with verbalization of fatigue/needing to sit. Patient with deficits in static standing tolerance this treatment compared to previous, tolerating range from 10-30 seconds. Bed->chair transfer completed with ModA, ~5 ft total. Patient with unsteadiness throughout, unable to perform RW management this treatment, requiring therapist to mobilize RW. Patient with difficulty turning, requiring therapist to provide constant verbal, tactile and physical guidance at hips to promote turning motion. Patient attempted to sit premature, requiring MaxA from therapist to prevent sitting, constant cues and assist to complete turn, then ModA for controlled descend onto bed. Patient with reports of \"too much drama\" once seated EOB.    Current functional status for toilet transfers:  Toilet Transfers  Toilet - Technique: Ambulating (w/ RW)  Equipment Used: Standard toilet (w/ Grab bars)  Toilet Transfer: Moderate assistance  Toilet Transfers Comments: Max VC and tactile cues to complete proper positioning at toilet w/ Pt demo attempt to sit prior to arrival at toilet. MAX VC/tactile cues to maintain upright posture and to fully turn to position for transfer. Max VC for RW positioning/proximity w/ Pt demo pushing RW far ahead. Once seated on toilet Pt attempt to stand w/ RW without assist, Pt redirected by removing RW and hand over hand to place BUE on grab bars.    Current functional status for  locomotion:  Ambulation  Surface: Level tile  Device: Rolling Walker  Assistance: Moderate assistance  Quality of Gait: Unsteady with no true LOB, mild flexed posture, minor B buckling, difficulty maintainin SARAH within RW despite max cues  Gait Deviations: Slow Varinder, Decreased step length, Decreased step height, Increased SARAH, Deviated path, Shuffles  Distance: 15' x2  Comments: Patient required constant verbal cues to maintain hand placement onto RW as pt would let go of RW with right hand and reach for wall rail. Patient demo decreased varinder with the occasional shuffled gait. Patient with reports of fatigue during ambulation, limiting distance, requiring seated rest break following and declining further ambulation. Pt with max cues to maintain SARAH within RW with poor carryover; assistance required to navigate RW  More Ambulation?: No    Current functional status for comprehension: Exceptions To Functional Limits    Current functional status for expression: Exceptions To Functional Limits    Current functional status for social interaction: Exceptions To Functional Limits    Current functional status for problem solving: Exceptions To Functional Limits    Current functional status for memory: Exceptions To Functional Limits    Current Deficits R/T Impairment: Impaired Functional Mobility and Decreased ADLs    Required Therapy Services:  Physical Therapy: Yes  Occupational Therapy: Yes  Speech Therapy: Yes      Additional Services:    [x] /Case Management    [] Recreational Therapy, as appropriate    [x] Nutrition Consult, as appropriate  [x] Dietary Needs/Preferences: No Specialized Dietary Needs/Preferences Identified  [] Dialysis  [x] Cultural/Restorationism Needs/Preferences: Cultural/Restorationism Needs/Preferences indicated as follows: Worship   [] Special Equipment Needs  [] Special Medication Needs  [] Other information relevant to patient's care needs:    Preferred Language: English    Does the

## 2025-01-17 NOTE — PROGRESS NOTES
Physical Therapy  Facility/Department: [address] [facility]   Physical Therapy Daily Treatment Note    Patient Name: Lanny Jaimes        MRN: [id number]    : 1955    Date of Service: 2025    Chief Complaint   Patient presents with    Fall     Frequent falls     Altered Mental Status     Past Medical History:  has a past medical history of Thyroid disease.  Past Surgical History:  has no past surgical history on file.    Discharge Recommendations  Discharge Recommendations: Patient able to tolerate 3 hours of therapy per day  Further therapy recommended at discharge.The patient should be able to tolerate at least 3 hours of therapy per day over 5 days or 15 hours over 7 days.   This patient may benefit from a Physical Medicine and Rehab consult.   PT Equipment Recommendations  Equipment Needed: Yes  Mobility Devices: Walker  Walker: Standard    Assessment  Body Structures, Functions, Activity Limitations Requiring Skilled Therapeutic Intervention: Decreased functional mobility ;Decreased endurance;Decreased balance;Decreased strength;Decreased coordination;Decreased cognition  Assessment: Patient presents with mobility, endurance and balance deficits. Patient required ModA for functional transfers. Bed mobility completed with CGA/SBA, HOB flat. Patient with reports of increased fatigue, declining ambulation. Patient most limited by deficits in cognition, balance, strength, coordination and mobility. Patient would benefit from a RW and 24 hour assistance with mobility upon discharge. Patient would benefit from additional intensive skilled therapy upon discharge to further reduce fall risk.  Therapy Prognosis: Good  Activity Tolerance  Activity Tolerance: Patient tolerated treatment well;Patient limited by fatigue  Safety Devices  Type of Devices: Call light within reach;Patient at risk for falls;Nurse notified;Gait belt;Bed alarm in place;All fall risk precautions in place;Left in bed  Restraints  Restraints  training, Functional mobility training, Transfer training, Gait training, Safety education & training, Therapeutic activities, Home exercise program, Patient/Caregiver education & training, Equipment evaluation, education, & procurement, Endurance training    Goals  Short Term Goals  Time Frame for Short Term Goals: 14 visits  Short Term Goal 1: Pt will perform sit<>stand transfer with supervision.  Short Term Goal 2: Pt will ambulate 300 feet with least restrictive AD and supervision.  Short Term Goal 3: Pt will demonstrate good- dynamic standing balance to decrease fall risk.  Short Term Goal 4: Pt will tolerate a 35 minute therapy session to promote increased endurance.  Short Term Goal 5: Pt will perform supine<>sit transfer with supervision.    Minutes  PT Individual Minutes  Time In: 1455  Time Out: 1537  Minutes: 42  Time Code Minutes  Timed Code Treatment Minutes: 38 Minutes    Electronically signed by Carolyn Thompson PTA on 1/17/25 at 3:54 PM EST

## 2025-01-17 NOTE — PROGRESS NOTES
[facility] Neurology   IN-PATIENT SERVICE   [facility]    Progress Note             Date:   1/17/2025  Patient name:  Lanny Jaimes  Date of admission:  1/7/2025  9:21 AM  MRN:   [id number]  Account:  [id number]  YOB: 1955  PCP:    No primary care provider on file.  Room:   [address] [place] [address]  Code Status:    Full Code    Chief Complaint:     Chief Complaint   Patient presents with    Fall     Frequent falls     Altered Mental Status       Interval hx:     The patient was seen and examined at bedside.  Is vitally stable, alert and oriented x 3.  No acute events overnight.   Neuro Exam: stable  Awaiting acute rehab placement to [facility].  Pending authorization.      Brief History of Present Illness:     69-year-old female presented as a transfer from outside facility as CT head read as small left parietal hemorrhagic contusion after a fall at home.  There were initial concerns for traumatic hemorrhage per notes however unusual appearance on imaging for a traumatic injury. MRI more consistent with CAA.  Initial SBP 220s.  ICH score 0.  Was evaluated by trauma, no acute traumatic injuries and neurocritical care was consulted for admission to neuro ICU.  Patient son at bedside endorsed history of uncontrolled hypertension with noncompliance with hypertension medication for over a year.  Additionally endorses noncompliance with other medications including Synthroid.  Patient underwent thyroid radiation in 2000 for hyperthyroidism resulting in iatrogenic hypothyroidism and started on Synthroid.  Per son, patient's mentation is at her baseline stating \"she is always been like this.\"  However he does report her family reports a progressive worsening of cognitive decline and frequent falls over the past year.  On initial evaluation, patient exhibited psychomotor slowing with delayed responses to questioning.  This is likely secondary to hypothyroidism not controlled with replacement therapy.

## 2025-01-17 NOTE — PROGRESS NOTES
[facility]  Nurse  Consult Note       NAME:  Lanny Jaimes  MEDICAL RECORD NUMBER:  [id number]  AGE: 69 y.o.   GENDER: female  : 1955  TODAY'S DATE:  2025    Subjective   Reason for [facility] Nurse Evaluation and Assessment: bilateral knees      Lanny Jaimes is a 69 y.o. female referred by:   [x] Physician  [] Nursing  [] Other:     Wound Identification:  Wound Type: traumatic    Wound History:   Admitted 25 after fall as transfer from [facility]; CT of the head demonstrated a left hemorrhagic contusion of the parietal lobe.   Abrasions to bilateral knees presnet with dry eschar / scabs that are tender to touch  Current Wound Care Treatment:  open to air    Objective    /62   Pulse 64   Temp 98.2 °F (36.8 °C) (Oral)   Resp 21   Ht 1.702 m (5' 7.01\")   Wt 67.6 kg (149 lb)   SpO2 98%   BMI 23.33 kg/m²     LABS:  WBC:    Lab Results   Component Value Date/Time    WBC 10.6 2025 04:41 AM     H/H:    Lab Results   Component Value Date/Time    HGB 11.6 2025 04:41 AM    HCT 37.3 2025 04:41 AM           Assessment   Ulisses Risk Score: Ulisses Scale Score: 18    Patient Active Problem List   Diagnosis Code    Focal hemorrhagic contusion of cerebrum S06.33AA    Acute intracranial hemorrhage (HCC) I62.9    Cerebral amyloid angiopathy (HCC) E85.4, I68.0    Acquired hypothyroidism E03.9    Primary hypertension I10    Fall W19.XXXA    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

## 2025-01-17 NOTE — PLAN OF CARE
Problem: Discharge Planning  Goal: Discharge to home or other facility with appropriate resources  1/17/2025 1713 by Agata Taveras RN  Outcome: Progressing  Flowsheets (Taken 1/17/2025 0800)  Discharge to home or other facility with appropriate resources: Identify barriers to discharge with patient and caregiver  1/17/2025 0606 by Clary Colon RN  Outcome: Progressing  Flowsheets (Taken 1/16/2025 2000)  Discharge to home or other facility with appropriate resources: Identify barriers to discharge with patient and caregiver     Problem: Neurosensory - Adult  Goal: Achieves stable or improved neurological status  1/17/2025 1713 by Agata Taveras RN  Outcome: Progressing  Flowsheets (Taken 1/17/2025 0800)  Achieves stable or improved neurological status: Assess for and report changes in neurological status  1/17/2025 0606 by Clary Colon RN  Outcome: Progressing  Flowsheets (Taken 1/16/2025 2000)  Achieves stable or improved neurological status: Assess for and report changes in neurological status  Goal: Absence of seizures  1/17/2025 1713 by Agata Taveras RN  Outcome: Progressing  Flowsheets (Taken 1/17/2025 0800)  Absence of seizures: Monitor for seizure activity.  If seizure occurs, document type and location of movements and any associated apnea  1/17/2025 0606 by Clary Colon RN  Outcome: Progressing  Flowsheets (Taken 1/16/2025 2000)  Absence of seizures: Monitor for seizure activity.  If seizure occurs, document type and location of movements and any associated apnea  Goal: Remains free of injury related to seizures activity  1/17/2025 1713 by Agata Taveras RN  Outcome: Progressing  Flowsheets (Taken 1/17/2025 0800)  Remains free of injury related to seizure activity: Maintain airway, patient safety  and administer oxygen as ordered  1/17/2025 0606 by Clary Colon RN  Outcome: Progressing  Flowsheets (Taken 1/16/2025 2000)  Remains free of injury related to seizure activity: Maintain airway, patient  (Taken 1/16/2025 2000)  Maintains hematologic stability: Assess for signs and symptoms of bleeding or hemorrhage     Problem: Safety - Adult  Goal: Free from fall injury  1/17/2025 1713 by Agata Taveras RN  Outcome: Progressing  Flowsheets (Taken 1/17/2025 0800)  Free From Fall Injury: Instruct family/caregiver on patient safety  1/17/2025 0606 by Clary Colon RN  Outcome: Progressing  Flowsheets (Taken 1/16/2025 2000)  Free From Fall Injury: Instruct family/caregiver on patient safety     Problem: Safety - Medical Restraint  Goal: Remains free of injury from restraints (Restraint for Interference with Medical Device)  Description: INTERVENTIONS:  1. Determine that other, less restrictive measures have been tried or would not be effective before applying the restraint  2. Evaluate the patient's condition at the time of restraint application  3. Inform patient/family regarding the reason for restraint  4. Q2H: Monitor safety, psychosocial status, comfort, nutrition and hydration  1/17/2025 1713 by Agata Taveras RN  Outcome: Progressing  1/17/2025 0606 by Clary Colon RN  Outcome: Progressing     Problem: Pain  Goal: Verbalizes/displays adequate comfort level or baseline comfort level  1/17/2025 1713 by Agata Taveras RN  Outcome: Progressing  Flowsheets  Taken 1/17/2025 1541  Verbalizes/displays adequate comfort level or baseline comfort level: Encourage patient to monitor pain and request assistance  Taken 1/17/2025 1136  Verbalizes/displays adequate comfort level or baseline comfort level: Encourage patient to monitor pain and request assistance  Taken 1/17/2025 0840  Verbalizes/displays adequate comfort level or baseline comfort level: Encourage patient to monitor pain and request assistance  1/17/2025 0606 by Clary Colon RN  Outcome: Progressing  Flowsheets (Taken 1/16/2025 1945)  Verbalizes/displays adequate comfort level or baseline comfort level: Encourage patient to monitor pain and request

## 2025-01-17 NOTE — CARE COORDINATION
Received  [employer] Portal Correspondence from payor [employer] representative suzy. Call Ref#na    Patient authorized for admission to Acute Inpatient Rehabilitation Stay under Auth/CaseRef# [id number]      Patient approved for 7 days for Acute Inpatient Rehabilitation level of care    Authorization valid for admission for the following timeframe: 1/17/1/23/2025    Next Review date: presumptively 1/22/2025  Continued stay clinical documentation to be submitted via: Online Portal: [employer]   Utilization Management : suzy , Phone: suzy    Updated Fatimah MURPHY CM:  messenger  at this time     244 pm Messaged Fatimah MURPHY CM, requesting to have IV medications removed from dc/readm orders.   Zofran, labetalol, hydralazine

## 2025-01-17 NOTE — PLAN OF CARE
Problem: Discharge Planning  Goal: Discharge to home or other facility with appropriate resources  1/17/2025 1841 by Agata Taveras RN  Outcome: Completed  1/17/2025 1713 by Agata Taveras RN  Outcome: Progressing  Flowsheets (Taken 1/17/2025 0800)  Discharge to home or other facility with appropriate resources: Identify barriers to discharge with patient and caregiver  1/17/2025 0606 by Clary Colon RN  Outcome: Progressing  Flowsheets (Taken 1/16/2025 2000)  Discharge to home or other facility with appropriate resources: Identify barriers to discharge with patient and caregiver     Problem: Discharge Planning  Goal: Discharge to home or other facility with appropriate resources  1/17/2025 1841 by Agata Taveras RN  Outcome: Completed  1/17/2025 1713 by Agata Taveras RN  Outcome: Progressing  Flowsheets (Taken 1/17/2025 0800)  Discharge to home or other facility with appropriate resources: Identify barriers to discharge with patient and caregiver  1/17/2025 0606 by Clary Colon RN  Outcome: Progressing  Flowsheets (Taken 1/16/2025 2000)  Discharge to home or other facility with appropriate resources: Identify barriers to discharge with patient and caregiver     Problem: Neurosensory - Adult  Goal: Achieves stable or improved neurological status  1/17/2025 1841 by Agata Taveras RN  Outcome: Completed  1/17/2025 1713 by Agata Taveras RN  Outcome: Progressing  Flowsheets (Taken 1/17/2025 0800)  Achieves stable or improved neurological status: Assess for and report changes in neurological status  1/17/2025 0606 by Clary Colon RN  Outcome: Progressing  Flowsheets (Taken 1/16/2025 2000)  Achieves stable or improved neurological status: Assess for and report changes in neurological status     Problem: Neurosensory - Adult  Goal: Achieves stable or improved neurological status  1/17/2025 1841 by Agata Taveras RN  Outcome: Completed  1/17/2025 1713 by Agata Taveras RN  Outcome: Progressing  Flowsheets (Taken  restraint  4. Q2H: Monitor safety, psychosocial status, comfort, nutrition and hydration  1/17/2025 1841 by Agata Taveras RN  Outcome: Completed  1/17/2025 1713 by Agata Taveras RN  Outcome: Progressing  1/17/2025 0606 by Clary Colon RN  Outcome: Progressing     Problem: Skin/Tissue Integrity  Goal: Absence of new skin breakdown  Description: 1.  Monitor for areas of redness and/or skin breakdown  2.  Assess vascular access sites hourly  3.  Every 4-6 hours minimum:  Change oxygen saturation probe site  4.  Every 4-6 hours:  If on nasal continuous positive airway pressure, respiratory therapy assess nares and determine need for appliance change or resting period.  1/17/2025 1841 by Agata Taveras RN  Outcome: Completed  1/17/2025 1713 by Agata Taveras RN  Outcome: Progressing  1/17/2025 0606 by Clary Colon RN  Outcome: Progressing

## 2025-01-17 NOTE — PROGRESS NOTES
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 I67.1    Hypertensive urgency I16.0       Pain: 0/10    Cognitive Treatment    Treatment time: :      Subjective: [] Alert [x] Cooperative     [x] Confused     [] Agitated    [] Lethargic      Objective/Assessment:  Attention: Pt tangential but responded to min-mod verbal cues for redirection    Orientation: Oriented 5/5 independently with increased time    Recall: Delayed recall 1 functional unit: 0/1 increased to 1/1 with min verbal cues, 1/1 independently, 1/1 independently    Organization: State the category concrete: 3/6 increased to 5/6 with mod-max verbal cues    Problem Solving/Reasoning: Stating logical conclusions:  2/5 increased to 4/5 with mod-max verbal cues    Functional problem solvin/4 increased to 3/4 with max verbal cues    Speech:   Reviewed education re: compensatory strategies to increase speech intelligibility/clarity.  Pt. Demonstrated limited understanding. Repeated multi syllabic words and phrases with mod-max cues x5 implementing strategies.        Plan:  [x] Continue ST services    [] Discharge from ST:      Discharge recommendations: [x]  Further therapy recommended at discharge.The patient should be able to tolerate at least 3 hours of therapy per day over 5 days or 15 hours over 7 days. [] Further therapy recommended at discharge.   [] No therapy recommended at discharge.          Treatment completed by: Krysta Shelton, SLP, M.S. CCC-SLP

## 2025-01-18 LAB
ALBUMIN SERPL-MCNC: 3.7 G/DL (ref 3.5–5.2)
ALP SERPL-CCNC: 69 U/L (ref 35–104)
ALT SERPL-CCNC: 23 U/L (ref 10–35)
ANION GAP SERPL CALCULATED.3IONS-SCNC: 9 MMOL/L (ref 9–16)
AST SERPL-CCNC: 23 U/L (ref 10–35)
BASOPHILS # BLD: 0.1 K/UL (ref 0–0.2)
BASOPHILS NFR BLD: 1 % (ref 0–2)
BILIRUB DIRECT SERPL-MCNC: 0.1 MG/DL (ref 0–0.3)
BILIRUB INDIRECT SERPL-MCNC: 0.2 MG/DL (ref 0–1)
BILIRUB SERPL-MCNC: 0.3 MG/DL (ref 0–1.2)
BUN SERPL-MCNC: 28 MG/DL (ref 8–23)
CALCIUM SERPL-MCNC: 8.9 MG/DL (ref 8.6–10.4)
CHLORIDE SERPL-SCNC: 107 MMOL/L (ref 98–107)
CO2 SERPL-SCNC: 25 MMOL/L (ref 20–31)
CREAT SERPL-MCNC: 0.7 MG/DL (ref 0.7–1.2)
EOSINOPHIL # BLD: 0.2 K/UL (ref 0–0.4)
EOSINOPHILS RELATIVE PERCENT: 2 % (ref 0–4)
ERYTHROCYTE [DISTWIDTH] IN BLOOD BY AUTOMATED COUNT: 16.5 % (ref 11.5–14.9)
GFR, ESTIMATED: >90 ML/MIN/1.73M2
GLUCOSE SERPL-MCNC: 94 MG/DL (ref 74–99)
HCT VFR BLD AUTO: 35.6 % (ref 36–46)
HGB BLD-MCNC: 11.8 G/DL (ref 12–16)
LYMPHOCYTES NFR BLD: 1.5 K/UL (ref 1–4.8)
LYMPHOCYTES RELATIVE PERCENT: 15 % (ref 24–44)
MCH RBC QN AUTO: 30.9 PG (ref 26–34)
MCHC RBC AUTO-ENTMCNC: 33.1 G/DL (ref 31–37)
MCV RBC AUTO: 93.3 FL (ref 80–100)
MONOCYTES NFR BLD: 0.8 K/UL (ref 0.1–1.3)
MONOCYTES NFR BLD: 8 % (ref 1–7)
MORPHOLOGY: NORMAL
NEUTROPHILS NFR BLD: 74 % (ref 36–66)
NEUTS SEG NFR BLD: 7.4 K/UL (ref 1.3–9.1)
PLATELET # BLD AUTO: 338 K/UL (ref 150–450)
PMV BLD AUTO: 7.5 FL (ref 6–12)
POTASSIUM SERPL-SCNC: 4.1 MMOL/L (ref 3.7–5.3)
PROT SERPL-MCNC: 6.7 G/DL (ref 6.6–8.7)
RBC # BLD AUTO: 3.82 M/UL (ref 4–5.2)
SODIUM SERPL-SCNC: 141 MMOL/L (ref 136–145)
WBC OTHER # BLD: 10 K/UL (ref 3.5–11)

## 2025-01-18 PROCEDURE — 97162 PT EVAL MOD COMPLEX 30 MIN: CPT

## 2025-01-18 PROCEDURE — 1180000000 HC REHAB R&B

## 2025-01-18 PROCEDURE — 97110 THERAPEUTIC EXERCISES: CPT

## 2025-01-18 PROCEDURE — 92523 SPEECH SOUND LANG COMPREHEN: CPT

## 2025-01-18 PROCEDURE — 85025 COMPLETE CBC W/AUTO DIFF WBC: CPT

## 2025-01-18 PROCEDURE — 6370000000 HC RX 637 (ALT 250 FOR IP): Performed by: GENERAL PRACTICE

## 2025-01-18 PROCEDURE — 6370000000 HC RX 637 (ALT 250 FOR IP)

## 2025-01-18 PROCEDURE — 80076 HEPATIC FUNCTION PANEL: CPT

## 2025-01-18 PROCEDURE — 36415 COLL VENOUS BLD VENIPUNCTURE: CPT

## 2025-01-18 PROCEDURE — 6360000002 HC RX W HCPCS

## 2025-01-18 PROCEDURE — 99223 1ST HOSP IP/OBS HIGH 75: CPT | Performed by: INTERNAL MEDICINE

## 2025-01-18 PROCEDURE — 80048 BASIC METABOLIC PNL TOTAL CA: CPT

## 2025-01-18 PROCEDURE — 97535 SELF CARE MNGMENT TRAINING: CPT

## 2025-01-18 PROCEDURE — 97166 OT EVAL MOD COMPLEX 45 MIN: CPT

## 2025-01-18 PROCEDURE — 97530 THERAPEUTIC ACTIVITIES: CPT

## 2025-01-18 RX ADMIN — LISINOPRIL 40 MG: 20 TABLET ORAL at 08:46

## 2025-01-18 RX ADMIN — AMLODIPINE BESYLATE 10 MG: 10 TABLET ORAL at 08:46

## 2025-01-18 RX ADMIN — CARVEDILOL 6.25 MG: 6.25 TABLET, FILM COATED ORAL at 08:46

## 2025-01-18 RX ADMIN — ENOXAPARIN SODIUM 40 MG: 100 INJECTION SUBCUTANEOUS at 08:47

## 2025-01-18 RX ADMIN — LEVOTHYROXINE SODIUM 100 MCG: 0.1 TABLET ORAL at 06:28

## 2025-01-18 RX ADMIN — POLYETHYLENE GLYCOL 3350 17 G: 17 POWDER, FOR SOLUTION ORAL at 08:46

## 2025-01-18 RX ADMIN — CARVEDILOL 6.25 MG: 6.25 TABLET, FILM COATED ORAL at 16:40

## 2025-01-18 RX ADMIN — ATORVASTATIN CALCIUM 80 MG: 80 TABLET, FILM COATED ORAL at 19:58

## 2025-01-18 RX ADMIN — ASPIRIN 81 MG: 81 TABLET, COATED ORAL at 08:47

## 2025-01-18 NOTE — CONSULTS
[facility] Internal Medicine  Miles Lundberg MD; Chad Gordon MD; Uriel Elliott MD; MD Nely Villa MD; Bonita Argueta MD    [facility] Internal Medicine   IN-PATIENT SERVICE   [facility]    HISTORY AND PHYSICAL EXAMINATION            Date:   1/18/2025  Patient name:  Lanny Jaimes  Date of admission:  1/17/2025  7:18 PM  MRN:   [id number]  Account:  [id number]  YOB: 1955  PCP:    No primary care provider on file.  Room:   [address] [place] [address]  Code Status:    Full Code    Chief Complaint:     No chief complaint on file.  Intracranial hemorrhage    History Obtained From:     Patient medical record nursing staff    History of Present Illness:     Lnany Jaimes is a 69 y.o. Non- / non  female who presents with No chief complaint on file.   and is admitted to the hospital for the management of Encephalopathy.    69-year-old  lady was transferred from [facility] to [facility] with altered mental status found to have small left parietal hemorrhagic contusion after a fall at home  MRI brain was done which shows consistent with hypertensive microangiopathy on presentation patient's systolic blood pressure was over 220  Repeat CTA head and neck showed left ICA cavernous aneurysm neurointervention was consulted has angiogram done which shows right posterior communicating infundibulum and left cavernous medially saccular aneurysm plan for outpatient intervention for left cavernous ICA aneurysm  For deconditioning patient was transferred to acute rehab for further therapy    Past Medical History:     Past Medical History:   Diagnosis Date    Thyroid disease         Past Surgical History:     No past surgical history on file.     Medications Prior to Admission:     Prior to Admission medications    Medication Sig Start Date End Date Taking? Authorizing Provider   levothyroxine (SYNTHROID) 88 MCG tablet Take 1 tablet by  atraumatic  Eye: no icterus, redness, pupils equal and reactive, extraocular eye movements intact, conjunctiva clear  Ear: normal external ear, no discharge, hearing intact  Nose: no drainage noted  Mouth: mucous membranes moist  Neck: supple, no carotid bruits, thyroid not palpable  Lungs: Bilateral equal air entry, clear to ausculation, no wheezing, rales or rhonchi, normal effort  Cardiovascular: normal rate, regular rhythm, no murmur, gallop, rub  Abdomen: Soft, nontender, nondistended, normal bowel sounds, no hepatomegaly or splenomegaly  Neurologic: There are no new focal motor or sensory deficits, normal muscle tone and bulk, no abnormal sensation, normal speech, cranial nerves II through XII grossly intact  Skin: No gross lesions, rashes, bruising or bleeding on exposed skin area  Extremities: peripheral pulses palpable, no pedal edema or calf pain with palpation  Psych: normal affect    Investigations:      Laboratory Testing:  Recent Results (from the past 24 hour(s))   Basic Metabolic Panel w/ Reflex to MG    Collection Time: 01/18/25  6:57 AM   Result Value Ref Range    Sodium 141 136 - 145 mmol/L    Potassium 4.1 3.7 - 5.3 mmol/L    Chloride 107 98 - 107 mmol/L    CO2 25 20 - 31 mmol/L    Anion Gap 9 9 - 16 mmol/L    Glucose 94 74 - 99 mg/dL    BUN 28 (H) 8 - 23 mg/dL    Creatinine 0.7 0.7 - 1.2 mg/dL    Est, Glom Filt Rate >90 >60 mL/min/1.73m2    Calcium 8.9 8.6 - 10.4 mg/dL   Hepatic Function Panel    Collection Time: 01/18/25  6:57 AM   Result Value Ref Range    Albumin 3.7 3.5 - 5.2 g/dL    Alkaline Phosphatase 69 35 - 104 U/L    ALT 23 10 - 35 U/L    AST 23 10 - 35 U/L    Total Bilirubin 0.3 0.0 - 1.2 mg/dL    Bilirubin, Direct 0.1 0.0 - 0.3 mg/dL    Bilirubin, Indirect 0.2 0.0 - 1.0 mg/dL    Total Protein 6.7 6.6 - 8.7 g/dL   CBC auto differential    Collection Time: 01/18/25  6:57 AM   Result Value Ref Range    WBC 10.0 3.5 - 11.0 k/uL    RBC 3.82 (L) 4.0 - 5.2 m/uL    Hemoglobin 11.8 (L) 12.0 -

## 2025-01-18 NOTE — PROGRESS NOTES
Date of Service: 1/9/25    Procedure: Diagnostic cerebral angiogram     Patient arrived and wheeled in to the angio suite at: 1440  Monitoring and administration of sedation started at: 1440  Puncture obtained at: 1538  Vascular access was removed at: 1724  Manual pressure for minutes: 15 mins  Sedation ended at: 1745  Patient wheeled out of the angio suite at: 1745    Diagnosis: Left cavernous segment aneurysm     Procedures:  --Right ultrasound guided femoral artery access  --Intravenous moderate sedation  --Selective right common carotid artery (CCA) angiogram   --Selective right internal carotid artery (ICA) cerebral angiogram   --Selective right vertebral artery (VA) cerebral angiogram   --Selective left common carotid artery (CCA) angiogram   --Selective left internal carotid artery (ICA) cerebral angiogram   -- 3d rotational angiography with reconstruction on an external workstation x2  --Selective right common femoral artery angiogram    Neurointerventionalist: Dr. Maged Giles    [place]:  Kishan Cárdenas MD, Analia Pierre MD    Contrast: 120 cc of Visipaque-270.    Fluoroscopy time: 32.7 minutes    Access: Right Common Femoral Artery    Consent:   After explaining the risks and benefits to the patient and the patient's family, including but not limited to stroke, coma, death, vessel injury, dissection, tear, occlusion, and X-ray dye allergic type reaction, a signed consent form was obtained.     Indication and Clinical History:   Lanny Jaimes is a 69 y.o. female with medical history of Left cavernous segment aneurysm. Pt was referred for a follow-up diagnostic angiogram.      Anesthesia:   Local anesthesia with lidocaine. IV moderate sedation with Versed and Fentanyl. IV moderate sedation was supervised by the attending physician. The patient was independently monitored by a registered nurse assigned to the Department of Radiology using automated blood pressure, EKG and pulse oximetry. The detailed

## 2025-01-18 NOTE — PROGRESS NOTES
[facility]   Acute Rehabilitation Occupational Therapy Evaluation     Date: 25  Patient Name: Lanny Jaimes       Room: 2619/2619-01  MRN: [id number]  Account: [id number]   : 1955  (69 y.o.) Gender: female     Referring Practitioner: Dr. Daniela Garcia  Diagnosis: Encephalopathy  Additional Pertinent Hx: Per chart: Ms. Lanny Jaimes is a 69 y.o. right handed female who was admitted to [facility] on 2025 with Fall (Frequent falls ) and Altered Mental Status.  Patient admitted from [facility] after fall from standing height. Small hemorrhagic parietal lobe contusion found and he was transferred to [place] for trauma and neurosurgery care.Neuro ICU: MRI showing microhemorrhage consistent with possible cerebral amyloid angiopathy. Small L parietal IPH with possible hemorrhagic contusion vs CAA.      Neurosurgery: confirmed multiple foci of microhemorrhage and diffusion restriction with small enhancing lesion R parietal lobe. No indication for surgical intervention. Recommending repeat MRI in a few months and signed off.Neuro endovascular: recommending strict hypertension management with SBP < 140mm Hg for hypertensive angiopathy. No current indication for antiplatelets. Having altered mentation/encephalopathy. DSA performed 25 with finding of L cavernous saccular aneurysm and plan for procedure in the next month or two.    Treatment Diagnosis: Impaired self-care status    Past Medical History:  has a past medical history of Thyroid disease.    Past Surgical History:   has no past surgical history on file.    Restrictions  Restrictions/Precautions: Fall Risk, Bed Alarm, General Precautions  Required Braces or Orthoses?: No    Vitals  Vitals  O2 Device: None (Room air)     Subjective  Subjective: \"There is just so much drama, too much drama\"    Comments: Pt made several comments about \"drama\" but was unable to elaborate much on what she meant by this.  At one point

## 2025-01-18 NOTE — PROGRESS NOTES
Physical Therapy  Facility/Department: [facility] ACUTE REHAB  Rehabilitation Physical Therapy Initial Assessment    NAME: Lanny Jaimes  : 1955 (69 y.o.)  MRN: [id number]  CODE STATUS: Full Code    Date of Service: 25      Past Medical History:   Diagnosis Date    Thyroid disease      No past surgical history on file.    Family/Caregiver Present: No    Restrictions:  Restrictions/Precautions: Fall Risk;Bed Alarm;General Precautions     SUBJECTIVE  Subjective: pt alert and cooperative          Prior Level of Function:  Social/Functional History  Lives With: Alone  Type of Home: Apartment  Home Layout: One level  Home Access: Level entry  Bathroom Shower/Tub: Walk-in shower  Bathroom Toilet: Handicap height  Bathroom Equipment: Grab bars in shower  Bathroom Accessibility: Accessible, Walker accessible  Home Equipment: Lift chair, Cane (? Recliner vs lift chair. pt states that she did have a cane but now it is lost)  Has the patient had two or more falls in the past year or any fall with injury in the past year?:  (1 fall causing admit)  Prior Level of Assist for ADLs: Independent  Prior Level of Assist for Homemaking: Independent  Homemaking Responsibilities: Yes  Meal Prep Responsibility: Primary  Laundry Responsibility: Primary  Cleaning Responsibility: Primary  Shopping Responsibility: Primary  Prior Level of Assist for Ambulation: Independent household ambulator, with or without device (pt reports using a cane due to arthritis in her knees L>R)  Prior Level of Assist for Transfers: Independent  Active : No  Patient's  Info: Son  Mode of Transportation: [facility]  Occupation: Retired  Type of Occupation: [employer]  Leisure & Hobbies: socialize,  Watch TV, read  IADL Comments: pt has a flat mattress but tends to sleep in her Recliner  Additional Comments: Son and daughter can provide some support PRN. pt is a questionable historian      OBJECTIVE  Vision  Vision:

## 2025-01-18 NOTE — H&P
Physical Medicine & Rehabilitation History and Physical  [facility] Acute Rehabilitation Unit     Primary care provider: No primary care provider on file.     Chief Complaint and Reason for Rehabilitation Admission:   Encephalopathy    History of Present Illness:  Lanny Jaimes  is a 69 y.o.    female admitted to the [place] Acute Rehabiliation unit on 1/17/2025.      Initially the patient was admitted from [facility] after fall from standing height. Small hemorrhagic parietal lobe contusion found and he was transferred to [place] for trauma and neurosurgery care.      Neuro ICU: MRI showing microhemorrhage consistent with possible cerebral amyloid angiopathy. Small L parietal IPH with possible hemorrhagic contusion vs CAA.      Neurosurgery: confirmed multiple foci of microhemorrhage and diffusion restriction with small enhancing lesion R parietal lobe. No indication for surgical intervention. Recommending repeat MRI in a few months and signed off.      Neuro endovascular: recommending strict hypertension management with SBP < 140mm Hg for hypertensive angiopathy. No current indication for antiplatelets. Having altered mentation/encephalopathy. For DSA today.      Patient continues to have some impaired mentation      is currently requiring assistance for self-care activities and mobility prompting this admission.    Premorbid function:  Independent     Current Function:      PT:      Objective  Orientation  Overall Orientation Status: Within Functional Limits  Orientation Level: Oriented to person;Oriented to time;Disoriented to situation;Disoriented to place (When asked where are you, pt reports \"I call it [facility]\" When asked what hospital are you in, pt able to report \"[facility]". Situation - \"Because my knees are so scabby\")  Cognition  Overall Cognitive Status: Exceptions  Arousal/Alertness: Delayed responses to stimuli  Following Commands: Follows one step commands

## 2025-01-18 NOTE — PLAN OF CARE
Problem: Discharge Planning  Goal: Discharge to home or other facility with appropriate resources  Outcome: Progressing     Problem: Skin/Tissue Integrity  Goal: Absence of new skin breakdown  Description: 1.  Monitor for areas of redness and/or skin breakdown  2.  Assess vascular access sites hourly  3.  Every 4-6 hours minimum:  Change oxygen saturation probe site  4.  Every 4-6 hours:  If on nasal continuous positive airway pressure, respiratory therapy assess nares and determine need for appliance change or resting period.  Outcome: Progressing     Problem: Safety - Adult  Goal: Free from fall injury  Outcome: Progressing  Flowsheets (Taken 1/17/2025 2152)  Free From Fall Injury: Instruct family/caregiver on patient safety     Problem: ABCDS Injury Assessment  Goal: Absence of physical injury  Outcome: Progressing

## 2025-01-18 NOTE — PROGRESS NOTES
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      Date of Eval: 2025   Evaluating Therapist: VERENICE King    RECENT RESULTS  CT OF HEAD/MRI: 25 CTA HEAD NECK W CONTRAST      IMPRESSION:  1. Lobulated aneurysm extending medially along the left cavernous ICA  measuring 12 x 11 x 10 mm.  2. No evidence of active bleeding.  3. Extensive atherosclerotic disease with areas of narrowing as described  above.  4. Pericardial effusion extending along the ascending aorta, incompletely  imaged.       Primary Complaint:   Lanny Jaimes  is a 69 y.o.    female admitted to the [place] Acute Rehabiliation unit on 2025.       Initially the patient was admitted from [facility] after fall from standing height. Small hemorrhagic parietal lobe contusion found and he was transferred to [place] for trauma and neurosurgery care.      Neuro ICU: MRI showing microhemorrhage consistent with possible cerebral amyloid angiopathy. Small L parietal IPH with possible hemorrhagic contusion vs CAA.      Neurosurgery: confirmed multiple foci of microhemorrhage and diffusion restriction with small enhancing lesion R parietal lobe. No indication for surgical intervention. Recommending repeat MRI in a few months and signed off.      Neuro endovascular: recommending strict hypertension management with SBP < 140mm Hg for hypertensive angiopathy. No current indication for antiplatelets. Having altered mentation/encephalopathy. For DSA today.      Patient continues to have some

## 2025-01-19 LAB
ANION GAP SERPL CALCULATED.3IONS-SCNC: 11 MMOL/L (ref 9–16)
BASOPHILS # BLD: 0.1 K/UL (ref 0–0.2)
BASOPHILS NFR BLD: 1 % (ref 0–2)
BUN SERPL-MCNC: 33 MG/DL (ref 8–23)
CALCIUM SERPL-MCNC: 9.2 MG/DL (ref 8.6–10.4)
CHLORIDE SERPL-SCNC: 110 MMOL/L (ref 98–107)
CO2 SERPL-SCNC: 24 MMOL/L (ref 20–31)
CREAT SERPL-MCNC: 0.7 MG/DL (ref 0.7–1.2)
EOSINOPHIL # BLD: 0.2 K/UL (ref 0–0.4)
EOSINOPHILS RELATIVE PERCENT: 2 % (ref 0–4)
ERYTHROCYTE [DISTWIDTH] IN BLOOD BY AUTOMATED COUNT: 16.5 % (ref 11.5–14.9)
GFR, ESTIMATED: >90 ML/MIN/1.73M2
GLUCOSE SERPL-MCNC: 104 MG/DL (ref 74–99)
HCT VFR BLD AUTO: 34.9 % (ref 36–46)
HGB BLD-MCNC: 11.7 G/DL (ref 12–16)
LYMPHOCYTES NFR BLD: 1.5 K/UL (ref 1–4.8)
LYMPHOCYTES RELATIVE PERCENT: 14 % (ref 24–44)
MCH RBC QN AUTO: 31 PG (ref 26–34)
MCHC RBC AUTO-ENTMCNC: 33.4 G/DL (ref 31–37)
MCV RBC AUTO: 92.9 FL (ref 80–100)
MONOCYTES NFR BLD: 0.9 K/UL (ref 0.1–1.3)
MONOCYTES NFR BLD: 8 % (ref 1–7)
NEUTROPHILS NFR BLD: 75 % (ref 36–66)
NEUTS SEG NFR BLD: 7.9 K/UL (ref 1.3–9.1)
PLATELET # BLD AUTO: 326 K/UL (ref 150–450)
PMV BLD AUTO: 7.5 FL (ref 6–12)
POTASSIUM SERPL-SCNC: 3.9 MMOL/L (ref 3.7–5.3)
RBC # BLD AUTO: 3.76 M/UL (ref 4–5.2)
SODIUM SERPL-SCNC: 145 MMOL/L (ref 136–145)
WBC OTHER # BLD: 10.6 K/UL (ref 3.5–11)

## 2025-01-19 PROCEDURE — 6360000002 HC RX W HCPCS

## 2025-01-19 PROCEDURE — 97535 SELF CARE MNGMENT TRAINING: CPT

## 2025-01-19 PROCEDURE — 97116 GAIT TRAINING THERAPY: CPT

## 2025-01-19 PROCEDURE — 1180000000 HC REHAB R&B

## 2025-01-19 PROCEDURE — 97530 THERAPEUTIC ACTIVITIES: CPT

## 2025-01-19 PROCEDURE — 99232 SBSQ HOSP IP/OBS MODERATE 35: CPT | Performed by: INTERNAL MEDICINE

## 2025-01-19 PROCEDURE — 97112 NEUROMUSCULAR REEDUCATION: CPT

## 2025-01-19 PROCEDURE — 85025 COMPLETE CBC W/AUTO DIFF WBC: CPT

## 2025-01-19 PROCEDURE — 36415 COLL VENOUS BLD VENIPUNCTURE: CPT

## 2025-01-19 PROCEDURE — 97110 THERAPEUTIC EXERCISES: CPT

## 2025-01-19 PROCEDURE — 6370000000 HC RX 637 (ALT 250 FOR IP)

## 2025-01-19 PROCEDURE — 80048 BASIC METABOLIC PNL TOTAL CA: CPT

## 2025-01-19 PROCEDURE — 6370000000 HC RX 637 (ALT 250 FOR IP): Performed by: GENERAL PRACTICE

## 2025-01-19 RX ADMIN — AMLODIPINE BESYLATE 10 MG: 10 TABLET ORAL at 07:23

## 2025-01-19 RX ADMIN — ENOXAPARIN SODIUM 40 MG: 100 INJECTION SUBCUTANEOUS at 07:23

## 2025-01-19 RX ADMIN — CARVEDILOL 6.25 MG: 6.25 TABLET, FILM COATED ORAL at 17:05

## 2025-01-19 RX ADMIN — ATORVASTATIN CALCIUM 80 MG: 80 TABLET, FILM COATED ORAL at 19:28

## 2025-01-19 RX ADMIN — LEVOTHYROXINE SODIUM 100 MCG: 0.1 TABLET ORAL at 05:26

## 2025-01-19 RX ADMIN — ASPIRIN 81 MG: 81 TABLET, COATED ORAL at 07:23

## 2025-01-19 RX ADMIN — ACETAMINOPHEN 1000 MG: 500 TABLET ORAL at 18:25

## 2025-01-19 RX ADMIN — POLYETHYLENE GLYCOL 3350 17 G: 17 POWDER, FOR SOLUTION ORAL at 07:23

## 2025-01-19 RX ADMIN — LISINOPRIL 40 MG: 20 TABLET ORAL at 07:23

## 2025-01-19 RX ADMIN — CARVEDILOL 6.25 MG: 6.25 TABLET, FILM COATED ORAL at 07:23

## 2025-01-19 ASSESSMENT — PAIN SCALES - GENERAL: PAINLEVEL_OUTOF10: 9

## 2025-01-19 ASSESSMENT — PAIN DESCRIPTION - DESCRIPTORS: DESCRIPTORS: ACHING

## 2025-01-19 ASSESSMENT — PAIN DESCRIPTION - LOCATION: LOCATION: KNEE

## 2025-01-19 ASSESSMENT — PAIN DESCRIPTION - ORIENTATION: ORIENTATION: RIGHT;LEFT

## 2025-01-19 NOTE — CONSULTS
Comprehensive Nutrition Assessment    Type and Reason for Visit:  Initial, Consult (Evaluate and Treat)    Nutrition Recommendations/Plan:   Obtain actual wt.   Continue current diet.      Malnutrition Assessment:  Malnutrition Status:  Insufficient data (None suspected) (01/19/25 7115)    Context:  Acute Illness     Findings of the 6 clinical characteristics of malnutrition:  Energy Intake:  Mild decrease in energy intake (Possibly over course of hospitalization)  Weight Loss:  Unable to assess     Body Fat Loss:  Unable to assess     Muscle Mass Loss:  Unable to assess    Fluid Accumulation:  No fluid accumulation     Strength:  Not Performed    Nutrition Assessment:    Pt admitted to rehab due to encephalopathy. Observed pt beginning to eat lunch. States she has been eating well with no known wt changes.    Nutrition Related Findings:    No edema. Synthroid. Labs and other meds reviewed. Hx: uncontrolled hypertension, hyperthyroidism treated with radiation resulting in iatrogenic hypothyroidism. Wound Type: Multiple (traumatic wounds)       Current Nutrition Intake & Therapies:    Average Meal Intake: 51-75%, %     ADULT DIET; Regular    Anthropometric Measures:  Height: 170.2 cm (5' 7.01\")  Ideal Body Weight (IBW): 135 lbs (61 kg)       Current Body Weight: 67.6 kg (149 lb 0.5 oz) (1/9 method unspecified), 110.4 % IBW. Weight Source: Not specified  Current BMI (kg/m2): 23.3  Usual Body Weight: 68 kg (150 lb) (per pt)     % Weight Change (Calculated): -0.6                    BMI Categories: Normal Weight (BMI 18.5-24.9)    Estimated Daily Nutrient Needs:  Energy Requirements Based On: Formula  Weight Used for Energy Requirements:  (listed wt 1/9)  Energy (kcal/day): 0356-1498 based on [place]-St. Jeor with 1.2-1.3 factor  Weight Used for Protein Requirements:  (wt 1/9)  Protein (g/day): 68-74 based on 1 to 1.1 gm per kg  Method Used for Fluid Requirements: 1 ml/kcal  Fluid (ml/day): or per

## 2025-01-19 NOTE — PLAN OF CARE
Problem: Discharge Planning  Goal: Discharge to home or other facility with appropriate resources  1/19/2025 0303 by Jessie Mulligan RN  Outcome: Progressing  1/18/2025 1648 by Yeison Pelaez LPN  Outcome: Progressing     Problem: Skin/Tissue Integrity  Goal: Absence of new skin breakdown  Description: 1.  Monitor for areas of redness and/or skin breakdown  2.  Assess vascular access sites hourly  3.  Every 4-6 hours minimum:  Change oxygen saturation probe site  4.  Every 4-6 hours:  If on nasal continuous positive airway pressure, respiratory therapy assess nares and determine need for appliance change or resting period.  1/19/2025 0303 by Jessie Mulligan RN  Outcome: Progressing  1/18/2025 1648 by Yeison Pelaez LPN  Outcome: Progressing     Problem: Safety - Adult  Goal: Free from fall injury  1/19/2025 0303 by Jessie Mulligan RN  Outcome: Progressing  1/18/2025 1648 by Yeison Pelaez LPN  Outcome: Progressing     Problem: ABCDS Injury Assessment  Goal: Absence of physical injury  1/19/2025 0303 by Jessie Mulligan RN  Outcome: Progressing  1/18/2025 1648 by Yeison Pelaez LPN  Outcome: Progressing

## 2025-01-19 NOTE — PROGRESS NOTES
[facility]   Acute Rehabilitation Occupational Therapy Daily Treatment Note    Date: 25  Patient Name: Lanny Jaimes       Room: 2619/2619-01  MRN: [id number]  Account: [id number]   : 1955  (69 y.o.) Gender: female       Referring Practitioner: Dr. Daniela Garcia  Diagnosis: Encephalopathy  Additional Pertinent Hx: Per chart: Ms. Lanny Jaimes is a 69 y.o. right handed female who was admitted to [facility] on 2025 with Fall (Frequent falls ) and Altered Mental Status.  Patient admitted from [facility] after fall from standing height. Small hemorrhagic parietal lobe contusion found and he was transferred to [facility] for trauma and neurosurgery care.Neuro ICU: MRI showing microhemorrhage consistent with possible cerebral amyloid angiopathy. Small L parietal IPH with possible hemorrhagic contusion vs CAA.      Neurosurgery: confirmed multiple foci of microhemorrhage and diffusion restriction with small enhancing lesion R parietal lobe. No indication for surgical intervention. Recommending repeat MRI in a few months and signed off.Neuro endovascular: recommending strict hypertension management with SBP < 140mm Hg for hypertensive angiopathy. No current indication for antiplatelets. Having altered mentation/encephalopathy. DSA performed 25 with finding of L cavernous saccular aneurysm and plan for procedure in the next month or two.    Treatment Diagnosis: Impaired self-care status    Past Medical History:  has a past medical history of Thyroid disease.    Past Surgical History:   has no past surgical history on file.    Restrictions  Restrictions/Precautions  Restrictions/Precautions: Fall Risk, Bed Alarm, General Precautions  Activity Level: Up as Tolerated, Up with Assist  Required Braces or Orthoses?: No    Subjective  Subjective  Subjective: \"No, I can't, I will slip and fall\"  Pain: When asked if she could try and pull her briefs down once in the shower.  Pt

## 2025-01-19 NOTE — PROGRESS NOTES
Physical Therapy  [facility]   Acute Rehabilitation Physical Therapy Treatment    Date: 25  Patient Name: Lanny Jaimes       Room: 2619/2619-01  MRN: [id number]  Account: [id number]   : 1955  (69 y.o.) Gender: female       Family/Caregiver Present: No  Follows Commands: Impaired  Other (Comment): Delayed processing, increased time and repetition required          Past Medical History:  has a past medical history of Thyroid disease.    Past Surgical History:   has no past surgical history on file.    Restrictions  Restrictions/Precautions  Restrictions/Precautions: Fall Risk, Bed Alarm, General Precautions  Activity Level: Up as Tolerated, Up with Assist  Required Braces or Orthoses?: No  Position Activity Restriction  Other Position/Activity Restrictions: SBP <140, activity as tolerated    Subjective  Subjective  Subjective: Pt arriving to gym in WC following OT sessions for AM & PM. Agreeable for both sessions.  In PM noting to OT she wanted a lighter sessions as she feels she has been pushed too hard, but in talking with patient prior to session she states \"it should be kicked up a notch\" but later in the sessions feeling overwhelemed with the amount of activity she has done today  Pain  Pre-Pain: 7  Pain Location: Right;Left;Knee  Pain Descriptor: Dull  Pain Interventions: Rest     Objective        Orientation  Overall Orientation Status: Impaired  Orientation Level: Oriented to person;Disoriented to place;Disoriented to time;Disoriented to situation  Cognition  Overall Cognitive Status: Exceptions  Arousal/Alertness: Inconsistent responses to stimuli  Following Commands: Follows one step commands with increased time;Follows one step commands with repetition  Attention Span: Attends with cues to redirect;Difficulty dividing attention (frequently needing cues to remain in task)  Memory: Decreased recall of recent events;Decreased recall of biographical Information  Safety

## 2025-01-19 NOTE — CONSULTS
[facility] Internal Medicine  Miles Lundberg MD; Chad Gordon MD; Uriel Elliott MD; MD Nely Villa MD; Bonita Argueta MD    [facility] Internal Medicine   IN-PATIENT SERVICE   [facility]    HISTORY AND PHYSICAL EXAMINATION            Date:   1/19/2025  Patient name:  Lanny Jaimes  Date of admission:  1/17/2025  7:18 PM  MRN:   [id number]  Account:  [id number]  YOB: 1955  PCP:    No primary care provider on file.  Room:   [address] [place] [address]  Code Status:    Full Code    Chief Complaint:     No chief complaint on file.  Intracranial hemorrhage    History Obtained From:     Patient medical record nursing staff    History of Present Illness:     Lanny Jaimes is a 69 y.o. Non- / non  female who presents with No chief complaint on file.   and is admitted to the hospital for the management of Encephalopathy.    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     Medications Prior to Admission:     Prior to Admission medications    Medication Sig Start Date End Date Taking? Authorizing Provider   levothyroxine (SYNTHROID) 88 MCG tablet Take 1 tablet by

## 2025-01-19 NOTE — PLAN OF CARE
Problem: Discharge Planning  Goal: Discharge to home or other facility with appropriate resources  1/19/2025 1042 by Yeison Pelaez LPN  Outcome: Progressing  1/19/2025 0303 by Jessie Mulligan, RN  Outcome: Progressing     Problem: Skin/Tissue Integrity  Goal: Absence of new skin breakdown  Description: 1.  Monitor for areas of redness and/or skin breakdown  2.  Assess vascular access sites hourly  3.  Every 4-6 hours minimum:  Change oxygen saturation probe site  4.  Every 4-6 hours:  If on nasal continuous positive airway pressure, respiratory therapy assess nares and determine need for appliance change or resting period.  1/19/2025 1042 by Yeison Pelaez LPN  Outcome: Progressing  1/19/2025 0303 by Jessie Mulligan, RN  Outcome: Progressing     Problem: Safety - Adult  Goal: Free from fall injury  1/19/2025 1042 by Yeison Pelaez LPN  Outcome: Progressing  1/19/2025 0303 by Jessie Mulligan RN  Outcome: Progressing     Problem: ABCDS Injury Assessment  Goal: Absence of physical injury  1/19/2025 1042 by Yeison Pelaez LPN  Outcome: Progressing  1/19/2025 0303 by Jessie Mulligan RN  Outcome: Progressing     Problem: Pain  Goal: Verbalizes/displays adequate comfort level or baseline comfort level  Outcome: Progressing

## 2025-01-19 NOTE — PLAN OF CARE
Individualized Plan of Care  [facility] Rehabilitation Unit    Rehabilitation physician: Dr. Padilla    Admit Date: 1/17/2025     Rehabilitation Diagnosis: Encephalopathy [G93.40]     Rehabilitation impairments: self care, mobility, safety, cognitive function, and communication    Factors facilitating achievement of predicted outcomes: Motivated, Cooperative, Pleasant, and Has needed Durable Medical Equipment at home  Barriers to the achievement of predicted outcomes: Confusion, Cognitive deficit, Limited safety awareness, Limited insight into deficits, Communication deficit, Decreased endurance, and Lower extremity weakness    Patient Goals: Improve independence with mobility, Improvement of mobility at a wheelchair level, Increase overall strength and endurance, Increase balance, Increase endurance, Increase independence with activities of daily living, Improve cognition, Increase self-awareness, Increase safety awareness, Increase community integration, Increase socialization, Functional communication with caregivers, Integrate appropriate pain management plan, Assure adequate nutritional option for discharge, Continence of bowel and bladder, and Provide appropriate patient and family education      NURSING:  Nursing goals for Lanny Jaimes while on the rehabilitation unit will include:  Continence of bowel and bladder, Adequate number of bowel movements, Urinate with no urinary retention >300ml in bladder, Complete bladder protocol with mcrae removal, Maintain O2 SATs at an acceptable level during stay, Effective pain management while on the rehabilitation unit, Establish adequate pain control plan for discharge, Absence of skin breakdown while on the rehabilitation unit, Improved skin integrity via assessments including wound measurements, Avoidance of any hospital acquired infections, No signs/symptoms of infection at the wound site, Freedom from injury during hospitalization, and Complete

## 2025-01-19 NOTE — PROGRESS NOTES
Physical Medicine & Rehabilitation  Progress Note    1/19/2025 8:59 AM     Subjective:   68 YO female admitted with the Dx of encephalopathy with impaired cognition, orientation and memory.  Fells well this morning, denies any new complaints, slept very well.B/B ok.      ROS:  Denies fevers, chills, sweats.  No chest pain, palpitations, lightheadedness.  Denies coughing, wheezing or shortness of breath.  Denies abdominal pain, nausea, diarrhea or constipation.  No new areas of joint pain.  Denies new areas of numbness or weakness.  Denies new anxiety or depression issues.  No new skin problems.    Rehabilitation:     PT    Functional Mobility  Bed mobility  Bridging: Stand by assistance  Rolling to Left: Stand by assistance  Rolling to Right: Stand by assistance  Supine to Sit: Minimal assistance (assist for trunk progression)  Sit to Supine: Contact guard assistance  Scooting: Minimal assistance (scooting hips to edge of bed)  Bed Mobility Comments: bed flat without rail  Transfers  Sit to Stand: Minimal Assistance;Contact guard assistance  Stand to Sit: Minimal Assistance;Contact guard assistance  Comment: pt required Annette from bed and CGA from toilet and Recliner. Verbal cues to puch off seated surfaces and to utilize arms of recliner and bars around toilet for lowering herself into sitting  Balance  Sitting - Static: Fair;+ (SBA)  Sitting - Dynamic: Fair;+ (SBA)  Standing - Static: Fair (CGA without device)  Standing - Dynamic: Fair;- (Annette without device)  Comments: pt stands with wide SARAH with unsteadiness increasing with decrease in SARAH     Environmental Mobility  Ambulation  Surface: Level tile  Device: Rolling Walker  Assistance: Minimal assistance  Distance: 15ft x 2  Comments: verbal/physical cues to stay within walker and proper placemnt with tursn and backing-up  More Ambulation?: Yes  Ambulation 2  Surface - 2: level tile  Device 2: Rolling Walker  Assistance 2: Minimal assistance  Distance:

## 2025-01-20 LAB
ANION GAP SERPL CALCULATED.3IONS-SCNC: 10 MMOL/L (ref 9–16)
BASOPHILS # BLD: 0.1 K/UL (ref 0–0.2)
BASOPHILS NFR BLD: 1 % (ref 0–2)
BUN SERPL-MCNC: 32 MG/DL (ref 8–23)
CALCIUM SERPL-MCNC: 8.7 MG/DL (ref 8.6–10.4)
CHLORIDE SERPL-SCNC: 109 MMOL/L (ref 98–107)
CO2 SERPL-SCNC: 26 MMOL/L (ref 20–31)
CREAT SERPL-MCNC: 0.8 MG/DL (ref 0.7–1.2)
EOSINOPHIL # BLD: 0.2 K/UL (ref 0–0.4)
EOSINOPHILS RELATIVE PERCENT: 2 % (ref 0–4)
ERYTHROCYTE [DISTWIDTH] IN BLOOD BY AUTOMATED COUNT: 16.5 % (ref 11.5–14.9)
GFR, ESTIMATED: 80 ML/MIN/1.73M2
GLUCOSE SERPL-MCNC: 106 MG/DL (ref 74–99)
HCT VFR BLD AUTO: 34.5 % (ref 36–46)
HGB BLD-MCNC: 11.5 G/DL (ref 12–16)
LYMPHOCYTES NFR BLD: 1.8 K/UL (ref 1–4.8)
LYMPHOCYTES RELATIVE PERCENT: 19 % (ref 24–44)
MCH RBC QN AUTO: 30.9 PG (ref 26–34)
MCHC RBC AUTO-ENTMCNC: 33.2 G/DL (ref 31–37)
MCV RBC AUTO: 92.9 FL (ref 80–100)
MONOCYTES NFR BLD: 0.8 K/UL (ref 0.1–1.3)
MONOCYTES NFR BLD: 8 % (ref 1–7)
NEUTROPHILS NFR BLD: 70 % (ref 36–66)
NEUTS SEG NFR BLD: 6.8 K/UL (ref 1.3–9.1)
PLATELET # BLD AUTO: 338 K/UL (ref 150–450)
PMV BLD AUTO: 7.6 FL (ref 6–12)
POTASSIUM SERPL-SCNC: 3.8 MMOL/L (ref 3.7–5.3)
RBC # BLD AUTO: 3.72 M/UL (ref 4–5.2)
SODIUM SERPL-SCNC: 145 MMOL/L (ref 136–145)
WBC OTHER # BLD: 9.8 K/UL (ref 3.5–11)

## 2025-01-20 PROCEDURE — 97535 SELF CARE MNGMENT TRAINING: CPT

## 2025-01-20 PROCEDURE — 97116 GAIT TRAINING THERAPY: CPT

## 2025-01-20 PROCEDURE — 97129 THER IVNTJ 1ST 15 MIN: CPT

## 2025-01-20 PROCEDURE — 36415 COLL VENOUS BLD VENIPUNCTURE: CPT

## 2025-01-20 PROCEDURE — 97110 THERAPEUTIC EXERCISES: CPT

## 2025-01-20 PROCEDURE — 97530 THERAPEUTIC ACTIVITIES: CPT

## 2025-01-20 PROCEDURE — 99232 SBSQ HOSP IP/OBS MODERATE 35: CPT | Performed by: PHYSICAL MEDICINE & REHABILITATION

## 2025-01-20 PROCEDURE — 6370000000 HC RX 637 (ALT 250 FOR IP)

## 2025-01-20 PROCEDURE — 97130 THER IVNTJ EA ADDL 15 MIN: CPT

## 2025-01-20 PROCEDURE — 6360000002 HC RX W HCPCS

## 2025-01-20 PROCEDURE — 1180000000 HC REHAB R&B

## 2025-01-20 PROCEDURE — 80048 BASIC METABOLIC PNL TOTAL CA: CPT

## 2025-01-20 PROCEDURE — 85025 COMPLETE CBC W/AUTO DIFF WBC: CPT

## 2025-01-20 PROCEDURE — 99232 SBSQ HOSP IP/OBS MODERATE 35: CPT | Performed by: INTERNAL MEDICINE

## 2025-01-20 RX ADMIN — ASPIRIN 81 MG: 81 TABLET, COATED ORAL at 07:34

## 2025-01-20 RX ADMIN — CARVEDILOL 6.25 MG: 6.25 TABLET, FILM COATED ORAL at 16:36

## 2025-01-20 RX ADMIN — LISINOPRIL 40 MG: 20 TABLET ORAL at 07:34

## 2025-01-20 RX ADMIN — ENOXAPARIN SODIUM 40 MG: 100 INJECTION SUBCUTANEOUS at 07:34

## 2025-01-20 RX ADMIN — LEVOTHYROXINE SODIUM 100 MCG: 0.1 TABLET ORAL at 05:44

## 2025-01-20 RX ADMIN — AMLODIPINE BESYLATE 10 MG: 10 TABLET ORAL at 07:34

## 2025-01-20 RX ADMIN — ATORVASTATIN CALCIUM 80 MG: 80 TABLET, FILM COATED ORAL at 20:15

## 2025-01-20 RX ADMIN — CARVEDILOL 6.25 MG: 6.25 TABLET, FILM COATED ORAL at 07:34

## 2025-01-20 ASSESSMENT — PAIN SCALES - GENERAL
PAINLEVEL_OUTOF10: 0

## 2025-01-20 ASSESSMENT — 9 HOLE PEG TEST
TESTTIME_SECONDS: 45.38
TESTTIME_SECONDS: 64.48
TEST_RESULT: IMPAIRED
TEST_RESULT: IMPAIRED

## 2025-01-20 NOTE — PROGRESS NOTES
Independent  Grooming: Supervision  Grooming Skilled Clinical Factors: Washed face, brushed teeth, and brushed hair w/c level at sink  UE Bathing: Supervision  UE Bathing Skilled Clinical Factors: w/c level at sink  LE Bathing: Minimal assistance  LE Bathing Skilled Clinical Factors: CGA/Min A for balance in standing; Cues for thoroughness  UE Dressing: Stand by assistance  UE Dressing Skilled Clinical Factors: OH t-shirt  LE Dressing: Moderate assistance  LE Dressing Skilled Clinical Factors: A to thread 2nd leg into pull-up and BLE into pants; Stood to pull up with CGA/Min A  Putting On/Taking Off Footwear: Minimal assistance  Putting On/Taking Off Footwear Skilled Clinical Factors: Able to doff B socks and don L sock; A to don R sock  Toileting: Minimal assistance      Toilet Transfers  Equipment Used: Standard toilet  Toilet Transfer: Minimal assistance  Toilet Transfers Comments: w/RW (per PT report)      SPEECH:  Subjective: [x] Alert     [x] Cooperative     [] Confused     [] Agitated    [] Lethargic        Objective/Assessment:     Attention: Pt. Easily distracted by internal/external stimuli. Several, frequent redirection to tasks required t/o session.     Orientation: Pt. Oriented to name, age, month, year, and situation independently. Pt. Oriented to JUANITO with min verbal cues. Disoriented to place. Education provided re: use of whiteboard/cell phone for orientation.      Recall: Short-term recall of 2 pictured items: 3-minute delay: 1/2, did not increase with max verbal cues. Additional 3-minute delay: 0/2, did not increase with max verbal cues with pt. Stating \"I didn't really look at them (pictures) and \"I've got too much going on\".      Problem Solving/Reasoning: State category: 4/5 increased to 5/5 with binary choices.   Add to category: 3/5 increased to 4/5 with min verbal cues.      Speech: Pt. With mild-mod dysarthria characterized by reduced rate of speech, blended word boundaries, and  impreciseness. Estimated to be roughly ~90% intelligible in known context. Add following goal to POC: Pt. Will utilize dysarthria compensatory strategies to increase speech clarity.     Other: Call light left within reach at end of session.    Objective:  /67   Pulse 62   Temp 98.6 °F (37 °C) (Oral)   Resp 18   Ht 1.702 m (5' 7.01\")   SpO2 97%   BMI 23.33 kg/m²       GEN: well developed, well nourished, NAD  HEENT: NCAT, PERRL, EOMI, mucous membranes pink and moist  CV: RRR, no murmurs, rubs or gallops  PULM: CTAB, no rales or rhonchi. Respirations WNL and unlabored  ABD: soft, NT, ND, BS+ and equal  NEURO: A&O x 2. Sensation intact to light touch.   MSK: Functional ROM all extremities .  Strength 5/5 key muscles all extremities  EXTREMITIES: No calf tenderness to palpation bilaterally. No edema BLEs  SKIN: warm dry and intact with good turgor  PSYCH: appropriately interactive. Affect WNL.     Diagnostics:     CBC:   Recent Labs     01/18/25  0657 01/19/25  0709 01/20/25  0650   WBC 10.0 10.6 9.8   RBC 3.82* 3.76* 3.72*   HGB 11.8* 11.7* 11.5*   HCT 35.6* 34.9* 34.5*   MCV 93.3 92.9 92.9   RDW 16.5* 16.5* 16.5*    326 338     BMP:   Recent Labs     01/18/25  0657 01/19/25  0709 01/20/25  0650    145 145   K 4.1 3.9 3.8    110* 109*   CO2 25 24 26   BUN 28* 33* 32*   CREATININE 0.7 0.7 0.8   GLUCOSE 94 104* 106*     BNP: No results for input(s): \"BNP\" in the last 72 hours.  PT/INR: No results for input(s): \"PROTIME\", \"INR\" in the last 72 hours.  APTT: No results for input(s): \"APTT\" in the last 72 hours.  CARDIAC ENZYMES: No results for input(s): \"CKMB\", \"CKMBINDEX\", \"TROPONINT\" in the last 72 hours.    Invalid input(s): \"CKTOTAL;3\" troponins   FASTING LIPID PANEL:  Lab Results   Component Value Date    CHOL 386 (H) 01/08/2025    HDL 57 01/08/2025    TRIG 269 (H) 01/08/2025     LIVER PROFILE:   Recent Labs     01/18/25  0657   AST 23   ALT 23   BILIDIR 0.1   BILITOT 0.3   ALKPHOS 69

## 2025-01-20 NOTE — PATIENT CARE CONFERENCE
[facility] Acute Inpatient Rehabilitation  TEAM CONFERENCE NOTE  Date: 25  Patient Name: Lanny Jaimes       Room: 2619/2619-01  MRN: [id number]       : 1955  (69 y.o.)     Gender: female           PRINCIPAL DIAGNOSIS: Encephalopathy    NURSING--------------------------------------------------------------------------------    Bladder Continence:  Incontinent at night  Patient able to sense urge to void urine: Yes  Patient able to control urge to void urine: No    Bowel Continence: Always Continent  Patient able to sense urge to void stool: Yes  Patient able to control urge to void stool: Yes    Date of Last BM: 2025    Bladder/Bowel Program Interventions: Both Bowel & Bladder Program In Place     Wounds/Incisions/Ulcers: Wounds present on bilateral knees, left foot     Pain Control: No pain concerns to address    Pain Medication Regimen Usage Pattern: MAR reviewed and pain medications are being used at the following frequency (Specify Medication, # Doses Administered on average per day, identified patterns of use - for example: time of day, prior to activity/therapy)  Tylenol 650mg every 4 hours as needed, not yet administered  Ibruprofen 800mg every 6 hours as needed, not yet administered    Fall Risk:  Falling star program initiated    Medication Education Program: Patient/Responsible Party unable to manage medication needs currently    Discharge Preparation Patient/Responsible Party Education In Progress:   [Complete for All Patients] Rehab Specific Admitting Diagnosis Education:  , Fall Prevention, Wound Care, and Subcutaneous Injection Education and safety awareness education     Nursing specific communication for TEAM: No additional information identified requiring communication at this time    PHYSICAL THERAPY------------------------------------------------------------------    Pain:  Pain: Pt noting B knee pain 7/10     Bed Mobility:  Bed mobility  Bridging: Stand by

## 2025-01-20 NOTE — PLAN OF CARE
Problem: Discharge Planning  Goal: Discharge to home or other facility with appropriate resources  1/20/2025 0905 by Sonia Wilkerson, RN  Outcome: Progressing     Problem: Skin/Tissue Integrity  Goal: Absence of new skin breakdown  Description: 1.  Monitor for areas of redness and/or skin breakdown  2.  Assess vascular access sites hourly  3.  Every 4-6 hours minimum:  Change oxygen saturation probe site  4.  Every 4-6 hours:  If on nasal continuous positive airway pressure, respiratory therapy assess nares and determine need for appliance change or resting period.  1/20/2025 0905 by Sonia Wilkerson, RN  Outcome: Progressing     Problem: Safety - Adult  Goal: Free from fall injury  1/20/2025 0905 by Sonia Wilkerson, RN  Outcome: Progressing     Problem: ABCDS Injury Assessment  Goal: Absence of physical injury  1/20/2025 0905 by Sonia Wilkerson, RN  Outcome: Progressing

## 2025-01-20 NOTE — PROGRESS NOTES
Physical Therapy  [facility]   Acute Rehabilitation Physical Therapy Treatment    Date: 25  Patient Name: Lanny Jaimes       Room: 2619/2619-01  MRN: [id number]  Account: [id number]   : 1955  (69 y.o.) Gender: female       Family/Caregiver Present: No  Follows Commands: Impaired  Other (Comment): Delayed processing, increased time and repetition required          Past Medical History:  has a past medical history of Thyroid disease.    Past Surgical History:   has no past surgical history on file.    Restrictions  Restrictions/Precautions  Restrictions/Precautions: Fall Risk, Bed Alarm, General Precautions  Activity Level: Up as Tolerated, Up with Assist  Required Braces or Orthoses?: No  Position Activity Restriction  Other Position/Activity Restrictions: SBP <140, activity as tolerated    Subjective  Subjective  Subjective: Pt resting in bed both AM and PM. Pleasant and cooperative with therapy  Pain  Pre-Pain: 0 (declines pain throughout sessions)  Post-Pain: 0     Objective        Orientation  Overall Orientation Status: Impaired  Orientation Level: Oriented to person;Disoriented to place;Disoriented to time;Disoriented to situation  Cognition  Overall Cognitive Status: Exceptions  Arousal/Alertness: Inconsistent responses to stimuli  Following Commands: Follows one step commands with increased time;Follows one step commands with repetition  Attention Span: Attends with cues to redirect;Difficulty dividing attention  Memory: Decreased recall of recent events;Decreased recall of biographical Information  Safety Judgement: Decreased awareness of need for assistance;Decreased awareness of need for safety  Problem Solving: Assistance required to generate solutions;Assistance required to implement solutions  Insights: Decreased awareness of deficits  Initiation: Requires cues for some  Sequencing: Requires cues for some  Cognition Comment: Overall delayed processing and attention

## 2025-01-20 NOTE — PROGRESS NOTES
[facility]   Acute Rehabilitation Occupational Therapy Daily Treatment Note    Date: 25  Patient Name: Lanny Jaimes       Room: 2619/2619-01  MRN: [id number]  Account: [id number]   : 1955  (69 y.o.) Gender: female     Referring Practitioner: Dr. Daniela Garcia  Diagnosis: Encephalopathy  Additional Pertinent Hx: Per chart: Ms. Lanny Jaimes is a 69 y.o. right handed female who was admitted to [facility] on 2025 with Fall (Frequent falls ) and Altered Mental Status.  Patient admitted from [facility] after fall from standing height. Small hemorrhagic parietal lobe contusion found and he was transferred to [facility] for trauma and neurosurgery care.Neuro ICU: MRI showing microhemorrhage consistent with possible cerebral amyloid angiopathy. Small L parietal IPH with possible hemorrhagic contusion vs CAA.      Neurosurgery: confirmed multiple foci of microhemorrhage and diffusion restriction with small enhancing lesion R parietal lobe. No indication for surgical intervention. Recommending repeat MRI in a few months and signed off.Neuro endovascular: recommending strict hypertension management with SBP < 140mm Hg for hypertensive angiopathy. No current indication for antiplatelets. Having altered mentation/encephalopathy. DSA performed 25 with finding of L cavernous saccular aneurysm and plan for procedure in the next month or two.    Treatment Diagnosis: Impaired self-care status    Past Medical History:  has a past medical history of Thyroid disease.    Past Surgical History:   has no past surgical history on file.    Restrictions  Restrictions/Precautions  Restrictions/Precautions: Fall Risk, Bed Alarm, General Precautions  Activity Level: Up as Tolerated, Up with Assist  Required Braces or Orthoses?: No     Position Activity Restriction  Other Position/Activity Restrictions: SBP <140, activity as tolerated          Subjective  Subjective  Subjective: \"It's too

## 2025-01-20 NOTE — PROGRESS NOTES
Speech Language Pathology  [facility] ACUTE REHAB    Cognitive Treatment Note    Date: 1/20/2025  Patient’s Name: Lanny Jaimes  MRN: [id number]  Diagnosis:   Patient Active Problem List   Diagnosis Code    Focal hemorrhagic contusion of cerebrum S06.33AA    Acute intracranial hemorrhage (HCC) I62.9    Cerebral amyloid angiopathy (HCC) E85.4, I68.0    Acquired hypothyroidism E03.9    Primary hypertension I10    Fall W19.XXXA    Aneurysm of cavernous portion of left internal carotid artery I67.1    Hypertensive urgency I16.0    Encephalopathy G93.40       Pain Rating: None reported    Cognitive Treatment    Treatment time:       SLP Individual Minutes  Time In: 1300  Time Out: 1330  Minutes: 30      Subjective: [x] Alert [x] Cooperative     [] Confused     [] Agitated    [] Lethargic      Objective/Assessment:    Attention: Pt. Easily distracted by internal/external stimuli. Several, frequent redirection to tasks required t/o session.    Orientation: Pt. Oriented to name, age, month, year, and situation independently. Pt. Oriented to JUANITO with min verbal cues. Disoriented to place. Education provided re: use of whiteboard/cell phone for orientation.     Recall: Short-term recall of 2 pictured items: 3-minute delay: 1/2, did not increase with max verbal cues. Additional 3-minute delay: 0/2, did not increase with max verbal cues with pt. Stating \"I didn't really look at them (pictures) and \"I've got too much going on\".     Problem Solving/Reasoning: State category: 4/5 increased to 5/5 with binary choices.   Add to category: 3/5 increased to 4/5 with min verbal cues.     Speech: Pt. With mild-mod dysarthria characterized by reduced rate of speech, blended word boundaries, and impreciseness. Estimated to be roughly ~90% intelligible in known context. Add following goal to POC: Pt. Will utilize dysarthria compensatory strategies to increase speech clarity.    Other: Call light left within reach at end of session.    Plan:  [x]

## 2025-01-20 NOTE — CARE COORDINATION
ARU CASE MANAGEMENT NOTE:    Admission Date:  1/17/2025 Lanny Jaimes is a 69 y.o.  female    Admitted for : Encephalopathy [G93.40]       Spoke with patient. Due to cognitive impairment, unable to accurately answer CM assessment questions. This RN and YOHAN Cintron CM called sonSha, and scheduled a meeting with him and Saida tomorrow at 3PM to discuss discharge planning. RN CM will complete initial patient evaluation at that time.     Outside appointments while in ARU: none    DME:TBD    Will continue to follow for additional discharge needs.    Electronically signed by Laura Martinez RN on 1/20/2025 at 2:24 PM

## 2025-01-20 NOTE — PLAN OF CARE
Problem: Discharge Planning  Goal: Discharge to home or other facility with appropriate resources  1/20/2025 0446 by Jessie Mulligan RN  Outcome: Progressing  1/20/2025 0445 by Jessie Mulligan RN  Outcome: Progressing     Problem: Skin/Tissue Integrity  Goal: Absence of new skin breakdown  Description: 1.  Monitor for areas of redness and/or skin breakdown  2.  Assess vascular access sites hourly  3.  Every 4-6 hours minimum:  Change oxygen saturation probe site  4.  Every 4-6 hours:  If on nasal continuous positive airway pressure, respiratory therapy assess nares and determine need for appliance change or resting period.  1/20/2025 0446 by Jessie Mulligan RN  Outcome: Progressing  1/20/2025 0445 by Jessie Mulligan RN  Outcome: Progressing     Problem: Safety - Adult  Goal: Free from fall injury  1/20/2025 0446 by Jessie Mulligan RN  Outcome: Progressing  1/20/2025 0445 by Jessie Mulligan RN  Outcome: Progressing     Problem: ABCDS Injury Assessment  Goal: Absence of physical injury  1/20/2025 0446 by Jessie Mulligan RN  Outcome: Progressing  1/20/2025 0445 by Jessie Mulligan RN  Outcome: Progressing     Problem: Pain  Goal: Verbalizes/displays adequate comfort level or baseline comfort level  1/20/2025 0446 by Jessie Mulligan RN  Outcome: Progressing  1/20/2025 0445 by Jessie Mulligan RN  Outcome: Progressing

## 2025-01-20 NOTE — CONSULTS
[facility] Internal Medicine  Miles Lundberg MD; Chad Gordon MD; Uriel Elliott MD; MD Nely Villa MD; Bonita Argueta MD    [facility] Internal Medicine   IN-PATIENT SERVICE   [facility]    HISTORY AND PHYSICAL EXAMINATION            Date:   1/20/2025  Patient name:  Lanny Jaimes  Date of admission:  1/17/2025  7:18 PM  MRN:   [id number]  Account:  [id number]  YOB: 1955  PCP:    No primary care provider on file.  Room:   [address] [place] [address]  Code Status:    Full Code    Chief Complaint:     No chief complaint on file.  Intracranial hemorrhage    History Obtained From:     Patient medical record nursing staff    History of Present Illness:     Lanny Jaimes is a 69 y.o. Non- / non  female who presents with No chief complaint on file.   and is admitted to the hospital for the management of Encephalopathy.    69-year-old  lady was transferred from [facility] to [facility] with altered mental status found to have small left parietal hemorrhagic contusion after a fall at home  MRI brain was done which shows consistent with hypertensive microangiopathy on presentation patient's systolic blood pressure was over 220  Repeat CTA head and neck showed left ICA cavernous aneurysm neurointervention was consulted has angiogram done which shows right posterior communicating infundibulum and left cavernous medially saccular aneurysm plan for outpatient intervention for left cavernous ICA aneurysm  For deconditioning patient was transferred to acute rehab for further therapy    Past Medical History:     Past Medical History:   Diagnosis Date    Thyroid disease         Past Surgical History:     No past surgical history on file.     Medications Prior to Admission:     Prior to Admission medications    Medication Sig Start Date End Date Taking? Authorizing Provider   levothyroxine (SYNTHROID) 88 MCG tablet Take 1 tablet by

## 2025-01-21 PROCEDURE — 99232 SBSQ HOSP IP/OBS MODERATE 35: CPT

## 2025-01-21 PROCEDURE — 97129 THER IVNTJ 1ST 15 MIN: CPT

## 2025-01-21 PROCEDURE — 6370000000 HC RX 637 (ALT 250 FOR IP): Performed by: PHYSICAL MEDICINE & REHABILITATION

## 2025-01-21 PROCEDURE — 6370000000 HC RX 637 (ALT 250 FOR IP)

## 2025-01-21 PROCEDURE — 99212 OFFICE O/P EST SF 10 MIN: CPT

## 2025-01-21 PROCEDURE — 97110 THERAPEUTIC EXERCISES: CPT

## 2025-01-21 PROCEDURE — 97535 SELF CARE MNGMENT TRAINING: CPT

## 2025-01-21 PROCEDURE — 97530 THERAPEUTIC ACTIVITIES: CPT

## 2025-01-21 PROCEDURE — 1180000000 HC REHAB R&B

## 2025-01-21 PROCEDURE — 6360000002 HC RX W HCPCS

## 2025-01-21 PROCEDURE — 97116 GAIT TRAINING THERAPY: CPT

## 2025-01-21 PROCEDURE — 6370000000 HC RX 637 (ALT 250 FOR IP): Performed by: GENERAL PRACTICE

## 2025-01-21 PROCEDURE — 99232 SBSQ HOSP IP/OBS MODERATE 35: CPT | Performed by: PHYSICAL MEDICINE & REHABILITATION

## 2025-01-21 PROCEDURE — 97130 THER IVNTJ EA ADDL 15 MIN: CPT

## 2025-01-21 RX ADMIN — ATORVASTATIN CALCIUM 80 MG: 80 TABLET, FILM COATED ORAL at 20:57

## 2025-01-21 RX ADMIN — ASPIRIN 81 MG: 81 TABLET, COATED ORAL at 07:21

## 2025-01-21 RX ADMIN — CARVEDILOL 6.25 MG: 6.25 TABLET, FILM COATED ORAL at 07:21

## 2025-01-21 RX ADMIN — AMLODIPINE BESYLATE 10 MG: 10 TABLET ORAL at 07:21

## 2025-01-21 RX ADMIN — POLYETHYLENE GLYCOL 3350 17 G: 17 POWDER, FOR SOLUTION ORAL at 07:21

## 2025-01-21 RX ADMIN — Medication 6 MG: at 20:57

## 2025-01-21 RX ADMIN — LISINOPRIL 40 MG: 20 TABLET ORAL at 07:21

## 2025-01-21 RX ADMIN — CARVEDILOL 6.25 MG: 6.25 TABLET, FILM COATED ORAL at 15:09

## 2025-01-21 RX ADMIN — LEVOTHYROXINE SODIUM 100 MCG: 0.1 TABLET ORAL at 05:33

## 2025-01-21 RX ADMIN — ENOXAPARIN SODIUM 40 MG: 100 INJECTION SUBCUTANEOUS at 07:21

## 2025-01-21 RX ADMIN — ACETAMINOPHEN 650 MG: 325 TABLET ORAL at 15:09

## 2025-01-21 ASSESSMENT — PAIN SCALES - GENERAL
PAINLEVEL_OUTOF10: 7
PAINLEVEL_OUTOF10: 0
PAINLEVEL_OUTOF10: 5
PAINLEVEL_OUTOF10: 3

## 2025-01-21 ASSESSMENT — PAIN DESCRIPTION - ORIENTATION
ORIENTATION: LEFT;RIGHT
ORIENTATION: RIGHT;LEFT

## 2025-01-21 ASSESSMENT — PAIN - FUNCTIONAL ASSESSMENT: PAIN_FUNCTIONAL_ASSESSMENT: ACTIVITIES ARE NOT PREVENTED

## 2025-01-21 ASSESSMENT — PAIN DESCRIPTION - LOCATION
LOCATION: KNEE
LOCATION: KNEE

## 2025-01-21 ASSESSMENT — PAIN DESCRIPTION - DESCRIPTORS
DESCRIPTORS: ACHING
DESCRIPTORS: ACHING

## 2025-01-21 NOTE — PLAN OF CARE
Problem: Discharge Planning  Goal: Discharge to home or other facility with appropriate resources  1/21/2025 0813 by Sonia Wilkerson, RN  Outcome: Progressing     Problem: Skin/Tissue Integrity  Goal: Absence of new skin breakdown  Description: 1.  Monitor for areas of redness and/or skin breakdown  2.  Assess vascular access sites hourly  3.  Every 4-6 hours minimum:  Change oxygen saturation probe site  4.  Every 4-6 hours:  If on nasal continuous positive airway pressure, respiratory therapy assess nares and determine need for appliance change or resting period.  1/21/2025 0813 by Sonia Wilkerson, RN  Outcome: Progressing     Problem: Safety - Adult  Goal: Free from fall injury  1/21/2025 0813 by Sonia Wilkerson, RN  Outcome: Progressing     Problem: ABCDS Injury Assessment  Goal: Absence of physical injury  1/21/2025 0813 by Sonia Wilkerson, RN  Outcome: Progressing     Problem: Pain  Goal: Verbalizes/displays adequate comfort level or baseline comfort level  1/21/2025 0813 by Sonia Wilkerson, RN  Outcome: Progressing

## 2025-01-21 NOTE — CONSULTS
[facility]  Consult Note       NAME:  Lanny Jaimes  MEDICAL RECORD NUMBER:  [id number]  AGE: 69 y.o.   GENDER: female  : 1955  TODAY'S DATE:  2025    Subjective:      Lanny Jaimes is a 69 y.o. female with inpatient referral to [facility] for:  Wound to knees and left foot      Wound Identification:  Wound Type: traumatic  Contributing Factors: none    Wound History: Patient had a fall prior to admission at [facility] on   Current Wound Care Treatment:  none    Patient Goal of Care:  [x] Wound Healing  [] Odor Control  [] Palliative Care  [] Pain Control   [] Other:         PAST MEDICAL HISTORY        Diagnosis Date    Thyroid disease        PAST SURGICAL HISTORY    No past surgical history on file.    FAMILY HISTORY    No family history on file.    SOCIAL HISTORY    Social History     Tobacco Use    Smoking status: Never   Substance Use Topics    Alcohol use: Not Currently    Drug use: Never         ALLERGIES    Allergies   Allergen Reactions    Pcn [Penicillins] Anaphylaxis       HOME MEDICATIONS  Prior to Admission medications    Medication Sig Start Date End Date Taking? Authorizing Provider   levothyroxine (SYNTHROID) 88 MCG tablet Take 1 tablet by mouth Daily    Provider, MD Mohinder       CURRENT MEDICATIONS:  Current Facility-Administered Medications   Medication Dose Route Frequency Provider Last Rate Last Admin    acetaminophen (TYLENOL) tablet 650 mg  650 mg Oral Q4H PRN Jez Mcleod MD        polyethylene glycol (GLYCOLAX) packet 17 g  17 g Oral Daily Jez Mcleod MD   17 g at 25 0721    senna (SENOKOT) tablet 17.2 mg  2 tablet Oral Daily PRN Jez Mcleod MD        bisacodyl (DULCOLAX) suppository 10 mg  10 mg Rectal Daily PRN Jez Mcleod MD        bacitracin ointment   Topical PRN Lewis Salcedo MD        enoxaparin (LOVENOX) injection 40 mg  40 mg SubCUTAneous Daily Lewis Salcedo MD   40

## 2025-01-21 NOTE — PROGRESS NOTES
[facility]   Acute Rehabilitation Occupational Therapy Daily Treatment Note    Date: 25  Patient Name: Lanny Jaimes       Room: 2619/2619-01  MRN: [id number]  Account: [id number]   : 1955  (69 y.o.) Gender: female     Referring Practitioner: Dr. Daniela Garcia  Diagnosis: Encephalopathy  Additional Pertinent Hx: Per chart: Ms. Lanny Jaimes is a 69 y.o. right handed female who was admitted to [facility] on 2025 with Fall (Frequent falls ) and Altered Mental Status.  Patient admitted from [facility] after fall from standing height. Small hemorrhagic parietal lobe contusion found and he was transferred to [facility] for trauma and neurosurgery care.Neuro ICU: MRI showing microhemorrhage consistent with possible cerebral amyloid angiopathy. Small L parietal IPH with possible hemorrhagic contusion vs CAA.      Neurosurgery: confirmed multiple foci of microhemorrhage and diffusion restriction with small enhancing lesion R parietal lobe. No indication for surgical intervention. Recommending repeat MRI in a few months and signed off.Neuro endovascular: recommending strict hypertension management with SBP < 140mm Hg for hypertensive angiopathy. No current indication for antiplatelets. Having altered mentation/encephalopathy. DSA performed 25 with finding of L cavernous saccular aneurysm and plan for procedure in the next month or two.    Treatment Diagnosis: Impaired self-care status    Past Medical History:  has a past medical history of Thyroid disease.    Past Surgical History:   has no past surgical history on file.    Restrictions  Restrictions/Precautions  Restrictions/Precautions: Fall Risk, Bed Alarm, General Precautions  Activity Level: Up as Tolerated, Up with Assist  Required Braces or Orthoses?: No     Position Activity Restriction  Other Position/Activity Restrictions: SBP <140, activity as tolerated          Subjective  Subjective  Subjective: \"yes and no\"  pt unable to answer questions and communicate needs directly occassionally. After shower, writer attempted to encourage pt to wear  clothes but pt adamantly refusing due to comfort of current clothes.  Pain Assessment  Pain Assessment: 0-10  Pain Level: 7 (in AM; in PM, pt denies pain)  Patient's Stated Pain Goal: 0 - No pain  Pain Location: Knee  Pain Orientation: Right;Left  Pain Descriptors: Aching  Non-Pharmaceutical Pain Intervention(s): Rest;Repositioned    Objective  Cognition  Overall Orientation Status: Impaired  Orientation Level: Oriented to person;Disoriented to place;Disoriented to time;Disoriented to situation    Cognition  Overall Cognitive Status: Exceptions  Arousal/Alertness: Inconsistent responses to stimuli  Following Commands: Follows one step commands with increased time;Follows one step commands with repetition  Attention Span: Attends with cues to redirect;Difficulty dividing attention  Memory: Decreased recall of recent events;Decreased recall of biographical Information  Safety Judgement: Decreased awareness of need for assistance;Decreased awareness of need for safety  Problem Solving: Assistance required to generate solutions;Assistance required to implement solutions  Insights: Decreased awareness of deficits  Initiation: Requires cues for some  Sequencing: Requires cues for some  Cognition Comment: Continued difficulty processing and verbalizing ideas/needs; poor problem-solving ability    Activities of Daily Living     Upper Extremity Bathing  Assistance Level: Stand by assist  Skilled Clinical Factors: Pt washes UB in shower with SBA and 2-3 VC to initiate, attend, and thoroughly complete task. set-up with soap and washcloth.    Lower Extremity Bathing  Equipment Provided: Long-handled sponge  Assistance Level: Moderate assistance  Skilled Clinical Factors: Pt requires max verbal and tactile cues with continual redirections required for attending to task safely, demonstrating

## 2025-01-21 NOTE — PROGRESS NOTES
Physical Therapy  Facility/Department: [facility] ACUTE REHAB      NAME: Lanny Jaimes  : 1955 (69 y.o.)  MRN: [id number]  CODE STATUS: Full Code    Date of Service: 25      Past Medical History:   Diagnosis Date    Thyroid disease      No past surgical history on file.    Other (Comment): Delayed processing, increased time and repetition required    Restrictions:  Restrictions/Precautions: Fall Risk;Bed Alarm;General Precautions  Position Activity Restriction  Other Position/Activity Restrictions: SBP <140, activity as tolerated     SUBJECTIVE  Subjective: Patient agreeable to both AM and PM sessions.     OBJECTIVE  Vision  Vision: Impaired  Vision Exceptions: Wears glasses at all times    Hearing  Hearing: Exceptions to WFL  Hearing Exceptions: Hard of hearing/hearing concerns       Functional Mobility  Bed mobility  Supine to Sit: Contact guard assistance (with cues for sequencing and use of bed rail)  Sit to Supine: Unable to assess  Scooting: Stand by assistance (to EOB)  Transfers  Sit to Stand: Contact guard assistance  Stand to Sit: Contact guard assistance  Bed to Chair: Contact guard assistance (stand pivot with RW)  Stand Pivot Transfers: Contact guard assistance (Cues for technique, hand placement, and sequencing)    Environmental Mobility  Ambulation  Surface: Level tile  Device: Rolling Walker  Assistance: Contact guard assistance  Quality of Gait: Unsteady with no true LOB, significant flexed posture with downward gaze, NBOS with mild scissoring gait. Decreased step length RLE  Gait Deviations: Slow Asia;Decreased step length;Decreased step height;Increased SARAH;Deviated path;Shuffles  Distance: 155 ft;  100 ft  Comments: Cues for upright posture, forward gaze, and larger steps RLE. Improves shortly then reverts back, requiring additional cueing  Stairs/Curb  Stairs?: Yes  Stairs  # Steps : 5  Stairs Height:  (4\" and 6\")  Rails: Bilateral  Assistance: Minimal assistance  Comment: Slightly unsteady

## 2025-01-21 NOTE — PROGRESS NOTES
SPEECH LANGUAGE PATHOLOGY  Speech Language Pathology  [facility] ACUTE REHAB    Cognitive Treatment Note    Date: 2025  Patient’s Name: Lanny Jaimes  MRN: [id number]  Diagnosis:   Patient Active Problem List   Diagnosis Code    Focal hemorrhagic contusion of cerebrum S06.33AA    Acute intracranial hemorrhage (HCC) I62.9    Cerebral amyloid angiopathy (HCC) E85.4, I68.0    Acquired hypothyroidism E03.9    Primary hypertension I10    Fall W19.XXXA    Aneurysm of cavernous portion of left internal carotid artery I67.1    Hypertensive urgency I16.0    Encephalopathy G93.40       Pain Rating: no c/o pain  Pain Location: N/A  Non-Pharmaceutical Pain Intervention: N/A    Cognitive Treatment    Treatment time:       SLP Individual Minutes  Time In: 1135  Time Out: 1204  Minutes: 29       Subjective: [x] Alert [x] Cooperative     [] Confused     [] Agitated    [] Lethargic      Objective/Assessment:  Attention: ST modified environment to reduce distractions. Pt required prolonged processing/response time and often required repeated stimuli due to inability to maintain attention to structured tasks.     Orientation: Oriented to self and knows she is in a hospital. Not oriented to facility/location or temporal concepts. Max A to attempt use of external supports.     Recall: Unable to recall morning therapy activities despite max A.  LTM/bio info: 70% mod-max A.     Organization: Convergent namin% MI, did not increase accuracy with max A.     Problem Solving/Reasoning: Simple problem scenarios: stated solutions with 25% accuracy (I). Pt demo decreased theory of mind and often stated , \"That doesn't happen to me.\" Pt required max A to include sufficient detail in responses.      Other: No family present. Pt in bed with call light in reach at end of session.     Plan:  [x] Continue ST services    [] Discharge from ST:      Discharge recommendations:  [x] Further therapy recommended at discharge.   [] No therapy recommended at

## 2025-01-21 NOTE — PROGRESS NOTES
[facility] Internal Medicine  Miles Lundberg MD; Chad Gordon MD; Uriel Elliott MD; MD Nely Villa MD; Bonita Argueta MD    [facility] Internal Medicine   IN-PATIENT SERVICE   [facility]    Progress note            Date:   1/21/2025  Patient name:  Lanny Jaimes  Date of admission:  1/17/2025  7:18 PM  MRN:   [id number]  Account:  [id number]  YOB: 1955  PCP:    No primary care provider on file.  Room:   [address] [place] [address]  Code Status:    Full Code    Chief Complaint:     No chief complaint on file.  Intracranial hemorrhage    History Obtained From:     Patient medical record nursing staff    History of Present Illness:     Lanny Jaimes is a 69 y.o. Non- / non  female who presents with No chief complaint on file.   and is admitted to the hospital for the management of Encephalopathy.    69-year-old  lady was transferred from [facility] to [facility] with altered mental status found to have small left parietal hemorrhagic contusion after a fall at home  MRI brain was done which shows consistent with hypertensive microangiopathy on presentation patient's systolic blood pressure was over 220  Repeat CTA head and neck showed left ICA cavernous aneurysm neurointervention was consulted has angiogram done which shows right posterior communicating infundibulum and left cavernous medially saccular aneurysm plan for outpatient intervention for left cavernous ICA aneurysm  For deconditioning patient was transferred to acute rehab for further therapy    Past Medical History:     Past Medical History:   Diagnosis Date    Thyroid disease         Past Surgical History:     No past surgical history on file.     Medications Prior to Admission:     Prior to Admission medications    Medication Sig Start Date End Date Taking? Authorizing Provider   levothyroxine (SYNTHROID) 88 MCG tablet Take 1 tablet by mouth Daily     Provider, Historical, MD        Allergies:     Pcn [penicillins]    Social History:     Tobacco:    reports that she has never smoked. She does not have any smokeless tobacco history on file.  Alcohol:      reports that she does not currently use alcohol.  Drug Use:  reports no history of drug use.    Family History:     No family history on file.    Review of Systems:     Positive and Negative as described in HPI.    CONSTITUTIONAL:  negative for fevers, chills, sweats, fatigue, weight loss  HEENT:  negative for vision, hearing changes, runny nose, throat pain  RESPIRATORY:  negative for shortness of breath, cough, congestion, wheezing  CARDIOVASCULAR:  negative for chest pain, palpitations  GASTROINTESTINAL:  negative for nausea, vomiting, diarrhea, constipation, change in bowel habits, abdominal pain   GENITOURINARY:  negative for difficulty of urination, burning with urination, frequency   INTEGUMENT:  negative for rash, skin lesions, easy bruising   HEMATOLOGIC/LYMPHATIC:  negative for swelling/edema   ALLERGIC/IMMUNOLOGIC:  negative for urticaria , itching  ENDOCRINE:  negative increase in drinking, increase in urination, hot or cold intolerance  MUSCULOSKELETAL:  negative joint pains, muscle aches, swelling of joints  NEUROLOGICAL:  negative for headaches, dizziness, lightheadedness, numbness, pain, tingling extremities  BEHAVIOR/PSYCH:  negative for depression, anxiety    Physical Exam:   /81   Pulse 62   Temp 98.2 °F (36.8 °C) (Oral)   Resp 15   Ht 1.702 m (5' 7.01\")   SpO2 95%   BMI 23.33 kg/m²   Temp (24hrs), Av.8 °F (36.6 °C), Min:97.3 °F (36.3 °C), Max:98.2 °F (36.8 °C)    No results for input(s): \"POCGLU\" in the last 72 hours.    Intake/Output Summary (Last 24 hours) at 2025 1539  Last data filed at 2025 0551  Gross per 24 hour   Intake --   Output 400 ml   Net -400 ml     Slightly  Mildly speech deficit  General Appearance: alert, well appearing, and in no acute

## 2025-01-21 NOTE — PROGRESS NOTES
Physical Medicine & Rehabilitation  Progress Note      Subjective:      69 year-old female with encephalopathy with impaired cognition. Patient is observed working with speech therapy today and exhibiting impaired problem-solving, impaired short term memory. Reviewed sleep log and she had poor sleep last night.     ROS:  Denies fevers, chills, sweats.  No chest pain, palpitations, lightheadedness.  Denies coughing, wheezing or shortness of breath.  Denies abdominal pain, nausea, diarrhea or constipation.  No new areas of joint pain.  Denies new areas of numbness or weakness.  Denies new anxiety or depression issues.  No new skin problems.    Rehabilitation:     PT:    Bed mobility  Bridging: Stand by assistance  Rolling to Left: Stand by assistance  Rolling to Right: Stand by assistance  Supine to Sit: Contact guard assistance (with cues for sequencing and use of bed rail)  Sit to Supine: Unable to assess  Scooting: Stand by assistance (to EOB)  Bed Mobility Comments: Pt left up in recliner in AM and in w/c with PT in PM         Transfers  Sit to Stand: Minimal Assistance  Stand to Sit: Minimal Assistance  Bed to Chair: Contact guard assistance (stand pivot with RW)  Stand Pivot Transfers: Contact guard assistance (Cues for technique, hand placement, and sequencing)  Comment: Pt needing cues to recall hand placement for all transfers -- tends B hands on walker. Osbaldo needed throughout to increase fwd translation and power to arise. Multiple STS tranfers from WC <> RW, WC <> // bas, WC <> nustep         Ambulation  Surface: Level tile  Device: Rolling Walker  Assistance: Contact guard assistance  Quality of Gait: Unsteady with no true LOB, significant flexed posture with downward gaze, NBOS with mild scissoring gait. Decreased step length RLE  Gait Deviations: Slow Asia, Decreased step length, Decreased step height, Increased SARAH, Deviated path, Shuffles  Distance: 213' in AM, 114' in PM  Comments: Cues for upright

## 2025-01-21 NOTE — CARE COORDINATION
[facility] Acute Inpatient Rehabilitation  Case Management Assessment  Initial Evaluation    Date/Time of Evaluation: 1/21/2025 4:51 PM  Assessment Completed by: Saida Metcalf RN    If patient is discharged prior to next notation, then this note serves as note for discharge by case management.    Patient Name: Lanny Jaimes                     Date / Time: 1/17/2025  7:18 PM  YOB: 1955  Diagnosis: Encephalopathy [G93.40]                     Patient Admission Status: REHAB IP   Readmission Risk (Low < 19, Mod (19-27), High > 27): Readmission Risk Score: 14.3      Current PCP: No primary care provider on file.  PCP verified by CM? Yes (Per son, no PCP - he had arranged new pt appt with Dr. Juan Antonio Barreto scheduled for 1/21 for his mother but had to cancel.)  Chart Reviewed: Yes      History Provided by: Child/Family  Patient Orientation: Person    Patient Cognition: Short Term Memory Deficit    Advance Directives:    Code Status: Full Code   Patient's Primary Decision Maker is: Legal Next of Kin    Primary Decision Maker: kim Friedman - Child - [phone]    Current Services Prior to Admission:  Current Financial resources: Medicare, Food [place]  Current community resources: Housing (subsidized apartment)  Current services prior to admission: Durable Medical Equipment  Current Home DME: Cane  Do you have a wheelchair ramp/Plans to build? No  Handicap Placard: Not Needed At This Time    Discharge Planning:  Patient lives with: Alone   Type of Home: House  Primary Care Giver: Self  Marital Status: Never   Patient Support Systems include: Children, Family Members   Family can provide assistance at DC: No  Does patient have 24 hour assistance at home:  No  Is patient agreeable to VNS or Outpatient therapy No - SNF  Freedom of choice provided: Yes  List of Home Care Agencies/Outpatient therapy provided: List of SNFs provided to patient's son  Type of Home Care services anticipated in

## 2025-01-22 PROBLEM — F03.90 MAJOR NEUROCOGNITIVE DISORDER (HCC): Status: ACTIVE | Noted: 2025-01-22

## 2025-01-22 PROCEDURE — 99231 SBSQ HOSP IP/OBS SF/LOW 25: CPT

## 2025-01-22 PROCEDURE — 1180000000 HC REHAB R&B

## 2025-01-22 PROCEDURE — 99232 SBSQ HOSP IP/OBS MODERATE 35: CPT | Performed by: PHYSICAL MEDICINE & REHABILITATION

## 2025-01-22 PROCEDURE — 6360000002 HC RX W HCPCS

## 2025-01-22 PROCEDURE — 2500000003 HC RX 250 WO HCPCS: Performed by: PHYSICAL MEDICINE & REHABILITATION

## 2025-01-22 PROCEDURE — 97130 THER IVNTJ EA ADDL 15 MIN: CPT

## 2025-01-22 PROCEDURE — 97110 THERAPEUTIC EXERCISES: CPT

## 2025-01-22 PROCEDURE — 6370000000 HC RX 637 (ALT 250 FOR IP)

## 2025-01-22 PROCEDURE — 6370000000 HC RX 637 (ALT 250 FOR IP): Performed by: PHYSICAL MEDICINE & REHABILITATION

## 2025-01-22 PROCEDURE — 97535 SELF CARE MNGMENT TRAINING: CPT

## 2025-01-22 PROCEDURE — 97530 THERAPEUTIC ACTIVITIES: CPT

## 2025-01-22 PROCEDURE — 97116 GAIT TRAINING THERAPY: CPT

## 2025-01-22 PROCEDURE — 97129 THER IVNTJ 1ST 15 MIN: CPT

## 2025-01-22 PROCEDURE — 6370000000 HC RX 637 (ALT 250 FOR IP): Performed by: GENERAL PRACTICE

## 2025-01-22 PROCEDURE — 90792 PSYCH DIAG EVAL W/MED SRVCS: CPT | Performed by: PSYCHIATRY & NEUROLOGY

## 2025-01-22 PROCEDURE — 92507 TX SP LANG VOICE COMM INDIV: CPT

## 2025-01-22 RX ORDER — DONEPEZIL HYDROCHLORIDE 10 MG/1
10 TABLET, FILM COATED ORAL NIGHTLY
Status: DISCONTINUED | OUTPATIENT
Start: 2025-01-25 | End: 2025-01-30 | Stop reason: HOSPADM

## 2025-01-22 RX ORDER — DONEPEZIL HYDROCHLORIDE 5 MG/1
5 TABLET, FILM COATED ORAL NIGHTLY
Status: COMPLETED | OUTPATIENT
Start: 2025-01-22 | End: 2025-01-24

## 2025-01-22 RX ADMIN — Medication 6 MG: at 20:55

## 2025-01-22 RX ADMIN — LEVOTHYROXINE SODIUM 100 MCG: 0.1 TABLET ORAL at 06:19

## 2025-01-22 RX ADMIN — LISINOPRIL 40 MG: 20 TABLET ORAL at 08:37

## 2025-01-22 RX ADMIN — MICONAZOLE NITRATE: 20 POWDER TOPICAL at 20:56

## 2025-01-22 RX ADMIN — DONEPEZIL HYDROCHLORIDE 5 MG: 5 TABLET, FILM COATED ORAL at 20:58

## 2025-01-22 RX ADMIN — POLYETHYLENE GLYCOL 3350 17 G: 17 POWDER, FOR SOLUTION ORAL at 08:37

## 2025-01-22 RX ADMIN — CARVEDILOL 6.25 MG: 6.25 TABLET, FILM COATED ORAL at 08:37

## 2025-01-22 RX ADMIN — ATORVASTATIN CALCIUM 80 MG: 80 TABLET, FILM COATED ORAL at 20:55

## 2025-01-22 RX ADMIN — ENOXAPARIN SODIUM 40 MG: 100 INJECTION SUBCUTANEOUS at 08:37

## 2025-01-22 RX ADMIN — AMLODIPINE BESYLATE 10 MG: 10 TABLET ORAL at 08:37

## 2025-01-22 RX ADMIN — MICONAZOLE NITRATE: 20 POWDER TOPICAL at 12:24

## 2025-01-22 RX ADMIN — CARVEDILOL 6.25 MG: 6.25 TABLET, FILM COATED ORAL at 17:14

## 2025-01-22 RX ADMIN — ASPIRIN 81 MG: 81 TABLET, COATED ORAL at 08:37

## 2025-01-22 RX ADMIN — ACETAMINOPHEN 650 MG: 325 TABLET ORAL at 08:37

## 2025-01-22 ASSESSMENT — PAIN SCALES - GENERAL
PAINLEVEL_OUTOF10: 0
PAINLEVEL_OUTOF10: 9
PAINLEVEL_OUTOF10: 7

## 2025-01-22 ASSESSMENT — PAIN DESCRIPTION - DESCRIPTORS: DESCRIPTORS: ACHING;TENDER

## 2025-01-22 ASSESSMENT — PAIN DESCRIPTION - LOCATION: LOCATION: ANKLE

## 2025-01-22 ASSESSMENT — PAIN - FUNCTIONAL ASSESSMENT: PAIN_FUNCTIONAL_ASSESSMENT: ACTIVITIES ARE NOT PREVENTED

## 2025-01-22 ASSESSMENT — PAIN DESCRIPTION - ORIENTATION: ORIENTATION: RIGHT;LEFT

## 2025-01-22 NOTE — PROGRESS NOTES
[facility] Internal Medicine  Miles Lundberg MD; Chad Gordon MD; Uriel Elliott MD; MD Nely Villa MD; Bonita Argueta MD    [facility] Internal Medicine   IN-PATIENT SERVICE   [facility]    Progress note            Date:   1/22/2025  Patient name:  Lanny Jaimes  Date of admission:  1/17/2025  7:18 PM  MRN:   [id number]  Account:  [id number]  YOB: 1955  PCP:    No primary care provider on file.  Room:   [address] [place] [address]  Code Status:    Full Code    Chief Complaint:     No chief complaint on file.  Intracranial hemorrhage    History Obtained From:     Patient medical record nursing staff    History of Present Illness:     Lanny Jaimes is a 69 y.o. Non- / non  female who presents with No chief complaint on file.   and is admitted to the hospital for the management of Encephalopathy.    69-year-old  lady was transferred from [facility] to [facility] with altered mental status found to have small left parietal hemorrhagic contusion after a fall at home  MRI brain was done which shows consistent with hypertensive microangiopathy on presentation patient's systolic blood pressure was over 220  Repeat CTA head and neck showed left ICA cavernous aneurysm neurointervention was consulted has angiogram done which shows right posterior communicating infundibulum and left cavernous medially saccular aneurysm plan for outpatient intervention for left cavernous ICA aneurysm  For deconditioning patient was transferred to acute rehab for further therapy    Past Medical History:     Past Medical History:   Diagnosis Date    Thyroid disease         Past Surgical History:     No past surgical history on file.     Medications Prior to Admission:     Prior to Admission medications    Medication Sig Start Date End Date Taking? Authorizing Provider   levothyroxine (SYNTHROID) 88 MCG tablet Take 1 tablet by mouth Daily     icterus, redness, pupils equal and reactive, extraocular eye movements intact, conjunctiva clear  Ear: normal external ear, no discharge, hearing intact  Nose: no drainage noted  Mouth: mucous membranes moist  Neck: supple, no carotid bruits, thyroid not palpable  Lungs: Bilateral equal air entry, clear to ausculation, no wheezing, rales or rhonchi, normal effort  Cardiovascular: normal rate, regular rhythm, no murmur, gallop, rub  Abdomen: Soft, nontender, nondistended, normal bowel sounds, no hepatomegaly or splenomegaly  Neurologic: There are no new focal motor or sensory deficits, normal muscle tone and bulk, no abnormal sensation, normal speech, cranial nerves II through XII grossly intact  Skin: No gross lesions, rashes, bruising or bleeding on exposed skin area  Extremities: peripheral pulses palpable, no pedal edema or calf pain with palpation  Psych: normal affect    Investigations:      Laboratory Testing:  No results found for this or any previous visit (from the past 24 hour(s)).      Imaging/Diagnostics:  No results found.    Assessment :      Hospital Problems             Last Modified POA    * (Principal) Encephalopathy 1/17/2025 Yes       Plan:   69-year-old  lady was transferred from [facility] to [facility] with altered mental status found to have small left parietal hemorrhagic contusion after a fall at home  MRI brain was done which shows consistent with hypertensive microangiopathy on presentation patient's systolic blood pressure was over 220  Repeat CTA head and neck showed left ICA cavernous aneurysm neurointervention was consulted has angiogram done which shows right posterior communicating infundibulum and left cavernous medially saccular aneurysm plan for outpatient intervention for left cavernous ICA aneurysm  For deconditioning patient was transferred to acute rehab for further therapy    Hypertension Norvasc 10 mg daily carvedilol 6.25 twice daily and

## 2025-01-22 NOTE — PLAN OF CARE
Problem: Discharge Planning  Goal: Discharge to home or other facility with appropriate resources  1/22/2025 0336 by Sherley Sanders LPN  Outcome: Progressing     Problem: Skin/Tissue Integrity  Goal: Absence of new skin breakdown  Description: 1.  Monitor for areas of redness and/or skin breakdown  2.  Assess vascular access sites hourly  3.  Every 4-6 hours minimum:  Change oxygen saturation probe site  4.  Every 4-6 hours:  If on nasal continuous positive airway pressure, respiratory therapy assess nares and determine need for appliance change or resting period.  1/22/2025 0336 by Sherley Sanders LPN  Outcome: Progressing     Problem: Safety - Adult  Goal: Free from fall injury  1/22/2025 0336 by Sherley Sanders LPN  Outcome: Progressing     Problem: ABCDS Injury Assessment  Goal: Absence of physical injury  1/22/2025 0336 by Sherley Sanders LPN  Outcome: Progressing     Problem: Pain  Goal: Verbalizes/displays adequate comfort level or baseline comfort level  1/22/2025 0336 by Sherley Sanders LPN  Outcome: Progressing

## 2025-01-22 NOTE — PROGRESS NOTES
[facility]   Acute Rehabilitation Occupational Therapy Daily Treatment Note    Date: 25  Patient Name: Lanny Jaimes       Room: 2619/2619-01  MRN: [id number]  Account: [id number]   : 1955  (69 y.o.) Gender: female       Referring Practitioner: Dr. Daniela Garcia  Diagnosis: Encephalopathy  Additional Pertinent Hx: Per chart: Ms. Lanny Jaimes is a 69 y.o. right handed female who was admitted to [facility] on 2025 with Fall (Frequent falls ) and Altered Mental Status.  Patient admitted from [facility] after fall from standing height. Small hemorrhagic parietal lobe contusion found and he was transferred to [place] for trauma and neurosurgery care.Neuro ICU: MRI showing microhemorrhage consistent with possible cerebral amyloid angiopathy. Small L parietal IPH with possible hemorrhagic contusion vs CAA.      Neurosurgery: confirmed multiple foci of microhemorrhage and diffusion restriction with small enhancing lesion R parietal lobe. No indication for surgical intervention. Recommending repeat MRI in a few months and signed off.Neuro endovascular: recommending strict hypertension management with SBP < 140mm Hg for hypertensive angiopathy. No current indication for antiplatelets. Having altered mentation/encephalopathy. DSA performed 25 with finding of L cavernous saccular aneurysm and plan for procedure in the next month or two.    Treatment Diagnosis: Impaired self-care status    Past Medical History:  has a past medical history of Thyroid disease.    Past Surgical History:   has no past surgical history on file.    Restrictions  Restrictions/Precautions  Restrictions/Precautions: Fall Risk, Bed Alarm, General Precautions  Activity Level: Up as Tolerated, Up with Assist  Required Braces or Orthoses?: No     Position Activity Restriction  Other Position/Activity Restrictions: SBP <140, activity as tolerated     Vitals      Subjective  Subjective  Subjective: AM:

## 2025-01-22 NOTE — PROGRESS NOTES
Physical Medicine & Rehabilitation  Progress Note      Subjective:      69 year-old female with encephalopathy with impaired cognition. Patient is observed working with speech therapy today and exhibiting impaired short-term memory. She does report that she slept better last night which is also confirmed by nursing. She is noting some mild aching pain in her B knees and ankles - encouraged her to try prn Tylenol.      ROS:  Denies fevers, chills, sweats.  No chest pain, palpitations, lightheadedness.  Denies coughing, wheezing or shortness of breath.  Denies abdominal pain, nausea, diarrhea or constipation.  No new areas of joint pain.  Denies new areas of numbness or weakness.  Denies new anxiety or depression issues.  No new skin problems.    Rehabilitation:     PT:    Bed mobility  Bridging: Stand by assistance  Rolling to Left: Stand by assistance  Rolling to Right: Stand by assistance  Supine to Sit: Contact guard assistance (with cues for sequencing and use of bed rail)  Sit to Supine: Unable to assess  Scooting: Stand by assistance (to EOB)  Bed Mobility Comments: Pt left up in recliner in AM and in w/c with PT in PM         Transfers  Sit to Stand: Contact guard assistance  Stand to Sit: Contact guard assistance  Bed to Chair: Contact guard assistance (stand pivot with RW)  Stand Pivot Transfers: Contact guard assistance (Cues for technique, hand placement, and sequencing)  Comment: Pt needing cues to recall hand placement for all transfers -- tends B hands on walker. Osbaldo needed throughout to increase fwd translation and power to arise. Multiple STS tranfers from WC <> RW, WC <> // bas, WC <> nustep         Ambulation  Surface: Level tile  Device: Rolling Walker  Assistance: Contact guard assistance  Quality of Gait: Unsteady with no true LOB, significant flexed posture with downward gaze, NBOS with mild scissoring gait. Decreased step length RLE  Gait Deviations: Slow Asia, Decreased step length,  on amlodipine, atorvastatin, ASA, carvedilol, lisinopril  Hypothyroidism: levothyroxine  L cavernous ICA aneurysm: s/p DSA 1/9/25. For outpatient neuro endovascular follow up  Bowel Management: Miralax daily, Senokot prn, Dulcolax prn  Insomnia: will start melatonin at hs.   Cutaneous candidiasis: start micotin powder  Internal medicine for medical management  DVT prophylaxis:  On Lovenox, TEDs during the day, EPC cuffs at night  Follow up: PCP 1-2 weeks, Neurosurgery (Dr. Ascencio), Neuro endovascular, Neurology, PM&R      Electronically signed by CHRISTIE AQUINO MD on 1/22/2025 at 9:10 AM      This note is created with the assistance of a speech recognition program.  While intending to generate a document that actually reflects the content of the visit, the document can still have some errors including those of syntax and sound a like substitutions which may escape proof reading.  In such instances, actual meaning can be extrapolated by contextual diversion.

## 2025-01-22 NOTE — CONSULTS
Department of Psychiatry  Behavioral Health Consult    REASON FOR CONSULT: Evaluation for decision making capacity     CONSULTING PHYSICIAN: Dr. Garcia    History obtained from: EMR     HISTORY OF PRESENT ILLNESS:    The patient is a 69 y.o. female with recent past medical history of a small left parietal hemorrhagic contusion after a fall at home.  Patient was transferred to [facility] and workup showed patient had multiple hypertensive microangiopathy findings on MRI.  Patient was found to have multiple saccular aneurysms and plans for outpatient intervention for left cavernous ICA aneurysm.  Patient was also found to have deconditioning and was transferred to [place] for acute rehab and further therapy.      Patient has a diagnosis of dementia and encephalopathy currently on donepezil.  No other psychiatric diagnoses from chart review.  Psychiatry was consulted for evaluation of decision-making capacity as family is initiating guardianship process.     Plan has not completely alert aware and oriented to her current situation.    Understanding  When asked if she understands why she is currently in the hospital and what occurred over the past few days patient is unable to recall the thoroughly and properly answer what her current medical situation is.  Patient is also unable to recall her previous medical history.  From my interview I do not believe patient completely understands her current medical situation.    Expressing a choice  No specific choice was to be made currently.     Appreciation   She does not appreciate her current medical situation.  Does not understand why she is in rehab.  Is quite frustrated that people are continuously asking her questions and she has she is having difficulty with recall and answering most questions.    Reasoning   She does not exhibit general understanding or appreciation of her current condition to be able to state proper reasoning for further medical

## 2025-01-22 NOTE — PROGRESS NOTES
Speech Language Pathology  [facility] ACUTE REHAB    Cognitive/Speech Treatment Note    Date: 2025  Patient’s Name: Lanny Jaimes  MRN: [id number]  Diagnosis:   Patient Active Problem List   Diagnosis Code    Focal hemorrhagic contusion of cerebrum S06.33AA    Acute intracranial hemorrhage (HCC) I62.9    Cerebral amyloid angiopathy (HCC) E85.4, I68.0    Acquired hypothyroidism E03.9    Primary hypertension I10    Fall W19.XXXA    Aneurysm of cavernous portion of left internal carotid artery I67.1    Hypertensive urgency I16.0    Encephalopathy G93.40       Pain Ratin/10, knees, Dr. Garcia rounding and aware.    Cognitive/Speech Treatment    Treatment time:       SLP Individual Minutes  Time: 5208-1350, 0099-6666  Minutes: 60      Subjective: [x] Alert [x] Cooperative     [] Confused     [] Agitated    [] Lethargic      Objective/Assessment:    Attention: ST shut door upon arrival and muted television to reduce external distractions. Pt. Very internally distracted t/o session and required frequent redirection to tasks.    Orientation: Oriented to name, , and situation independently. Oriented to JUANITO with use of visual supports (whiteboard). Oriented to age and current year with binary choices. Oriented to place/month with min verbal/phonemic cues. Reinforced use of whiteboard to increase orientation to time.     Recall: Immediate recall of 3 units: 5/5.  Immediate recall of 4 units: 2/5 increased to 5/5 with several repetitions of stimuli, min verbal cues, or use of chunking.   Short-term recall of 2 pictured items: 2-minute delay: 1/2 increased to 2/2 with min verbal cues, additional 3-minute delay: 0/2 increased to 2/2 with min verbal cues.     Problem Solving/Reasoning: Category members (1st letter naming): 1/5 increased to 4/5 with mod-max verbal/phonemic cues. ST implementing visualization as word finding strategy t/o task.  Similarities/differences: 2/6 increased to 6/6 with mod verbal cues.     Speech: Pt.

## 2025-01-22 NOTE — PLAN OF CARE
Problem: Discharge Planning  Goal: Discharge to home or other facility with appropriate resources  1/22/2025 1257 by Kaylee Jacobson RN  Outcome: Progressing     Problem: Skin/Tissue Integrity  Goal: Absence of new skin breakdown  Description: 1.  Monitor for areas of redness and/or skin breakdown  2.  Assess vascular access sites hourly  3.  Every 4-6 hours minimum:  Change oxygen saturation probe site  4.  Every 4-6 hours:  If on nasal continuous positive airway pressure, respiratory therapy assess nares and determine need for appliance change or resting period.  1/22/2025 1257 by Kaylee Jacobson, RN  Outcome: Progressing     Problem: Safety - Adult  Goal: Free from fall injury  1/22/2025 1257 by Kaylee Jacobson RN  Outcome: Progressing     Problem: ABCDS Injury Assessment  Goal: Absence of physical injury  1/22/2025 1257 by Kaylee Jacobson, RN  Outcome: Progressing

## 2025-01-23 PROCEDURE — 6370000000 HC RX 637 (ALT 250 FOR IP)

## 2025-01-23 PROCEDURE — 97110 THERAPEUTIC EXERCISES: CPT

## 2025-01-23 PROCEDURE — 6370000000 HC RX 637 (ALT 250 FOR IP): Performed by: GENERAL PRACTICE

## 2025-01-23 PROCEDURE — 6370000000 HC RX 637 (ALT 250 FOR IP): Performed by: PHYSICAL MEDICINE & REHABILITATION

## 2025-01-23 PROCEDURE — 97116 GAIT TRAINING THERAPY: CPT

## 2025-01-23 PROCEDURE — 97535 SELF CARE MNGMENT TRAINING: CPT

## 2025-01-23 PROCEDURE — 97130 THER IVNTJ EA ADDL 15 MIN: CPT

## 2025-01-23 PROCEDURE — 97530 THERAPEUTIC ACTIVITIES: CPT

## 2025-01-23 PROCEDURE — 1180000000 HC REHAB R&B

## 2025-01-23 PROCEDURE — 6360000002 HC RX W HCPCS

## 2025-01-23 PROCEDURE — 97129 THER IVNTJ 1ST 15 MIN: CPT

## 2025-01-23 PROCEDURE — 99232 SBSQ HOSP IP/OBS MODERATE 35: CPT | Performed by: INTERNAL MEDICINE

## 2025-01-23 PROCEDURE — 99232 SBSQ HOSP IP/OBS MODERATE 35: CPT | Performed by: PHYSICAL MEDICINE & REHABILITATION

## 2025-01-23 PROCEDURE — 92507 TX SP LANG VOICE COMM INDIV: CPT

## 2025-01-23 RX ADMIN — POLYETHYLENE GLYCOL 3350 17 G: 17 POWDER, FOR SOLUTION ORAL at 07:47

## 2025-01-23 RX ADMIN — Medication 6 MG: at 21:54

## 2025-01-23 RX ADMIN — ACETAMINOPHEN 650 MG: 325 TABLET ORAL at 07:52

## 2025-01-23 RX ADMIN — ASPIRIN 81 MG: 81 TABLET, COATED ORAL at 07:47

## 2025-01-23 RX ADMIN — CARVEDILOL 6.25 MG: 6.25 TABLET, FILM COATED ORAL at 07:47

## 2025-01-23 RX ADMIN — CARVEDILOL 6.25 MG: 6.25 TABLET, FILM COATED ORAL at 17:05

## 2025-01-23 RX ADMIN — ATORVASTATIN CALCIUM 80 MG: 80 TABLET, FILM COATED ORAL at 21:54

## 2025-01-23 RX ADMIN — LEVOTHYROXINE SODIUM 100 MCG: 0.1 TABLET ORAL at 06:39

## 2025-01-23 RX ADMIN — ENOXAPARIN SODIUM 40 MG: 100 INJECTION SUBCUTANEOUS at 07:47

## 2025-01-23 RX ADMIN — AMLODIPINE BESYLATE 10 MG: 10 TABLET ORAL at 07:47

## 2025-01-23 RX ADMIN — LISINOPRIL 40 MG: 20 TABLET ORAL at 07:47

## 2025-01-23 RX ADMIN — DONEPEZIL HYDROCHLORIDE 5 MG: 5 TABLET, FILM COATED ORAL at 21:54

## 2025-01-23 RX ADMIN — ACETAMINOPHEN 650 MG: 325 TABLET ORAL at 21:54

## 2025-01-23 ASSESSMENT — PAIN DESCRIPTION - DESCRIPTORS
DESCRIPTORS: ACHING

## 2025-01-23 ASSESSMENT — PAIN DESCRIPTION - LOCATION
LOCATION: KNEE

## 2025-01-23 ASSESSMENT — PAIN SCALES - GENERAL
PAINLEVEL_OUTOF10: 3
PAINLEVEL_OUTOF10: 9
PAINLEVEL_OUTOF10: 0
PAINLEVEL_OUTOF10: 9
PAINLEVEL_OUTOF10: 8
PAINLEVEL_OUTOF10: 8

## 2025-01-23 ASSESSMENT — PAIN DESCRIPTION - ORIENTATION
ORIENTATION: LEFT
ORIENTATION: LEFT;RIGHT;ANTERIOR
ORIENTATION: RIGHT;LEFT

## 2025-01-23 ASSESSMENT — PAIN - FUNCTIONAL ASSESSMENT
PAIN_FUNCTIONAL_ASSESSMENT: ACTIVITIES ARE NOT PREVENTED
PAIN_FUNCTIONAL_ASSESSMENT: ACTIVITIES ARE NOT PREVENTED

## 2025-01-23 NOTE — PROGRESS NOTES
01/23/25 1100   Encounter Summary   Encounter Overview/Reason Attempted Encounter   Service Provided For Patient not available  (Patient  with therapy.)   Assessment/Intervention/Outcome   Assessment Unable to assess  (Patient unavailable with therapy.)

## 2025-01-23 NOTE — PLAN OF CARE
Problem: Discharge Planning  Goal: Discharge to home or other facility with appropriate resources  1/23/2025 1038 by Mary Cortez LPN  Outcome: Progressing     Problem: Skin/Tissue Integrity  Goal: Absence of new skin breakdown  Description: 1.  Monitor for areas of redness and/or skin breakdown  2.  Assess vascular access sites hourly  3.  Every 4-6 hours minimum:  Change oxygen saturation probe site  4.  Every 4-6 hours:  If on nasal continuous positive airway pressure, respiratory therapy assess nares and determine need for appliance change or resting period.  1/23/2025 1038 by Mary Cortez LPN  Outcome: Progressing     Problem: Safety - Adult  Goal: Free from fall injury  1/23/2025 1038 by Mary Cortez LPN  Outcome: Progressing     Problem: ABCDS Injury Assessment  Goal: Absence of physical injury  1/23/2025 1038 by Mary Cortez LPN  Outcome: Progressing     Problem: Pain  Goal: Verbalizes/displays adequate comfort level or baseline comfort level  Outcome: Progressing

## 2025-01-23 NOTE — PROGRESS NOTES
Speech Language Pathology  [facility] ACUTE REHAB    Cognitive Treatment Note    Date: 2025  Patient’s Name: Lanny Jaimes  MRN: [id number]  Diagnosis:   Patient Active Problem List   Diagnosis Code    Focal hemorrhagic contusion of cerebrum S06.33AA    Acute intracranial hemorrhage (HCC) I62.9    Cerebral amyloid angiopathy (HCC) E85.4, I68.0    Acquired hypothyroidism E03.9    Primary hypertension I10    Fall W19.XXXA    Aneurysm of cavernous portion of left internal carotid artery I67.1    Hypertensive urgency I16.0    Encephalopathy G93.40    Major neurocognitive disorder (HCC) F03.90       Pain Ratin/10, knees    Cognitive Treatment    Treatment time:       SLP Individual Minutes  Time In: 1033  Time Out: 1103  Minutes: 30     SLP Individual Minutes  Time In: 1335  Time Out: 1400  Minutes: 25       Subjective: [x] Alert [x] Cooperative     [] Confused     [] Agitated    [] Lethargic      Objective/Assessment:    Attention: ST shut door upon arrival and muted television to reduce external distractions. Pt. Demo intermittent internal distraction t/o session and required occasional redirection to tasks.     Orientation: Pt. Oriented to place, , and JUANITO independently. Oriented to age and year with binary choices. Oriented to month and situation with min verbal cues (pt. Stated she was in rehab \"because of her knees\" but could not elaborate). Pt. Able to state whiteboard as visual support, requires cueing to utilize independently.     Recall: Category inclusion (2 units): 4/4.   Category inclusion (3 units): 3/8 increased to 8/8 with mod-max verbal/visual cues, several repetitions of stimuli, and binary choices.   Functional memory (directions): 6/10 increased to 9/10 with several repetitions of stimuli, mod verbal/visual cues, and chunking.    Problem Solving/Reasoning: Divergent naming (body parts): +1 independently increased to +6 with mod verbal/visual cues.   Missing equipment: 0/4 increased to 4/4 with

## 2025-01-23 NOTE — CARE COORDINATION
ARU CASE MANAGEMENT NOTE:    Admission Date:  1/17/2025 Lanny Jaimes is a 69 y.o.  female    Admitted for : Encephalopathy [G93.40]    Expected Discharge Date: 01/28/25 (Pending SNF Placement)    RN CM received call from [facility] - they are able to accept patient at this time. [facility] also responded - they do not have an open bed at this time but will call RN CM Friday afternoon to update on pending discharges. RN CM will update son.    Outside appointments while in ARU: none    DME:TBD    Will continue to follow for additional discharge needs.    Electronically signed by Saida Metcalf RN on 1/23/2025 at 3:14 PM

## 2025-01-23 NOTE — PROGRESS NOTES
Physical Therapy  Facility/Department: [facility] ACUTE REHAB      NAME: Lanny Jaimes  : 1955 (69 y.o.)  MRN: [id number]  CODE STATUS: Full Code    Date of Service: 25      Past Medical History:   Diagnosis Date    Thyroid disease      No past surgical history on file.    Family/Caregiver Present: No  Other (Comment): Delayed processing, increased time and repetition required    Restrictions:  Restrictions/Precautions: Fall Risk;Bed Alarm;General Precautions  Position Activity Restriction  Other Position/Activity Restrictions: SBP <140, activity as tolerated     SUBJECTIVE  Subjective: Patient continues to be pleasantly confused.  Reports she was able to visit with her son the other day.       Functional Mobility  Bed mobility  Bridging: Supervision  Rolling to Left: Supervision  Rolling to Right: Supervision  Supine to Sit: Minimal assistance (States L elbow is too sore to press up onto.)  Sit to Supine: Supervision  Scooting: Supervision (hips laterally on mat)  Bed Mobility Comments: Mat, wedge, 2 pillows. Entering/exiting to Pt's L.  Transfers  Sit to Stand: Contact guard assistance  Stand to Sit: Contact guard assistance (Cues for eccentric control; no carryover.)  Bed to Chair: Contact guard assistance (stand pivot w/o device; Pt seeks UE support.)  Stand Pivot Transfers: Contact guard assistance (Cues for technique, hand placement, and sequencing)  Comment: RW, unless noted otherwise.  Balance  Posture: Fair  Sitting - Static: Fair;+ (Edge of mat)  Sitting - Dynamic: Fair (Edge of mat)  Standing - Static: Poor;+ (w/o device; refused to let go of w/c)  Standing - Dynamic: Poor;+ (mckenzie rail.)    Environmental Mobility  Ambulation  Surface: Level tile  Device: Mckenzie rail  Assistance: Minimal assistance  Quality of Gait: Unsteady, lacks bilateral TKE, flexed posture & downward gaze, NBOS with mild scissoring gait. Decreased step length RLE  Gait Deviations: Slow Asia;Decreased step length;Decreased step

## 2025-01-23 NOTE — PROGRESS NOTES
[facility] Internal Medicine  Miles Lundberg MD; Chad Gordon MD; Uriel Elliott MD; MD Nely Villa MD; Bonita Argueta MD    [facility] Internal Medicine   IN-PATIENT SERVICE   [facility]    Progress note            Date:   1/23/2025  Patient name:  Lanny Jaimes  Date of admission:  1/17/2025  7:18 PM  MRN:   [id number]  Account:  [id number]  YOB: 1955  PCP:    No primary care provider on file.  Room:   [address] [place] [address]  Code Status:    Full Code    Chief Complaint:     No chief complaint on file.  Intracranial hemorrhage    History Obtained From:     Patient medical record nursing staff    History of Present Illness:     Lanny Jaimes is a 69 y.o. Non- / non  female who presents with No chief complaint on file.   and is admitted to the hospital for the management of Encephalopathy.    69-year-old  lady was transferred from [facility] to [facility] with altered mental status found to have small left parietal hemorrhagic contusion after a fall at home  MRI brain was done which shows consistent with hypertensive microangiopathy on presentation patient's systolic blood pressure was over 220  Repeat CTA head and neck showed left ICA cavernous aneurysm neurointervention was consulted has angiogram done which shows right posterior communicating infundibulum and left cavernous medially saccular aneurysm plan for outpatient intervention for left cavernous ICA aneurysm  For deconditioning patient was transferred to acute rehab for further therapy    Past Medical History:     Past Medical History:   Diagnosis Date    Thyroid disease         Past Surgical History:     No past surgical history on file.     Medications Prior to Admission:     Prior to Admission medications    Medication Sig Start Date End Date Taking? Authorizing Provider   levothyroxine (SYNTHROID) 88 MCG tablet Take 1 tablet by mouth Daily

## 2025-01-23 NOTE — PROGRESS NOTES
Physical Therapy  [facility]   Acute Rehabilitation Physical Therapy Treatment    Date: 25  Patient Name: Lanny Jaimes       Room: 2619/2619-01  MRN: [id number]  Account: [id number]   : 1955  (69 y.o.) Gender: female     Response To Previous Treatment: Patient with no complaints from previous session.  Family/Caregiver Present: No  Follows Commands: Impaired  Other (Comment): Delayed processing, increased time and repetition required     Past Medical History:  has a past medical history of Thyroid disease.    Past Surgical History:   has no past surgical history on file.    Restrictions  Restrictions/Precautions  Restrictions/Precautions: Fall Risk, Bed Alarm, General Precautions  Activity Level: Up as Tolerated, Up with Assist  Required Braces or Orthoses?: No  Position Activity Restriction  Other Position/Activity Restrictions: SBP <140, activity as tolerated    Subjective  Subjective  Subjective: Patient agreeable to both AM and PM sessions. 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 States she slept well, appetite is good.  Pain  Pre-Pain: 8  Pain Location: Knee;Right;Left  Pain Descriptor: Aching;Sore  Pain Interventions: Nurse notified (Nani reports Pt had Tylenol <1h before.)     Objective  Bed Mobility   Bed mobility  Bridging: Supervision  Rolling to Left: Supervision  Rolling to Right: Supervision  Supine to Sit: Minimal assistance (States L elbow is too sore to press up onto.)  Sit to Supine: Supervision  Scooting: Supervision (hips laterally on mat)  Bed Mobility Comments: Mat, wedge, 2 pillows. Entering/exiting to Pt's L.    Transfers   Transfers  Sit to Stand: Contact guard assistance  Stand to Sit: Contact guard assistance (Cues for eccentric control; no carryover.)  Bed to Chair: Contact guard assistance (stand

## 2025-01-23 NOTE — PROGRESS NOTES
[facility]   Acute Rehabilitation Occupational Therapy Daily Treatment Note    Date: 25  Patient Name: Lanny Jaimes       Room: 2619/2619-01  MRN: [id number]  Account: [id number]   : 1955  (69 y.o.) Gender: female       Referring Practitioner: Dr. Daniela Garcia  Diagnosis: Encephalopathy  Additional Pertinent Hx: Per chart: Ms. Lanny Jaimes is a 69 y.o. right handed female who was admitted to [facility] on 2025 with Fall (Frequent falls ) and Altered Mental Status.  Patient admitted from [facility] after fall from standing height. Small hemorrhagic parietal lobe contusion found and he was transferred to [facility] for trauma and neurosurgery care.Neuro ICU: MRI showing microhemorrhage consistent with possible cerebral amyloid angiopathy. Small L parietal IPH with possible hemorrhagic contusion vs CAA.      Neurosurgery: confirmed multiple foci of microhemorrhage and diffusion restriction with small enhancing lesion R parietal lobe. No indication for surgical intervention. Recommending repeat MRI in a few months and signed off.Neuro endovascular: recommending strict hypertension management with SBP < 140mm Hg for hypertensive angiopathy. No current indication for antiplatelets. Having altered mentation/encephalopathy. DSA performed 25 with finding of L cavernous saccular aneurysm and plan for procedure in the next month or two.    Treatment Diagnosis: Impaired self-care status    Past Medical History:  has a past medical history of Thyroid disease.    Past Surgical History:   has no past surgical history on file.    Restrictions  Restrictions/Precautions  Restrictions/Precautions: Fall Risk, Bed Alarm, General Precautions  Activity Level: Up as Tolerated, Up with Assist  Required Braces or Orthoses?: No     Position Activity Restriction  Other Position/Activity Restrictions: SBP <140, activity as tolerated     Vitals      Subjective  Subjective  Subjective: AM:  Pt in recliner chair upon arrival. \"I just want to lay down, cover up, and take a load off.\" Declining participation in bathing or dressing tasks. Agreeable to use bathroom. Declined to go back down to the therapy gym to participate. \"This is all just too much.\" Agreeable to simple functional tasks at bed side. PM: Pt in wheelchair upon arrival. Agreeable to participate with writer at this time. But continues to say \"This is all too much.\"     Objective  Cognition  Overall Orientation Status: Impaired  Orientation Level: Oriented to person;Disoriented to place;Disoriented to time;Disoriented to situation    Cognition  Overall Cognitive Status: Exceptions  Arousal/Alertness: Inconsistent responses to stimuli  Following Commands: Follows one step commands with increased time;Follows one step commands with repetition  Attention Span: Attends with cues to redirect;Difficulty dividing attention  Memory: Decreased recall of recent events;Decreased recall of biographical Information  Safety Judgement: Decreased awareness of need for assistance;Decreased awareness of need for safety  Problem Solving: Assistance required to generate solutions;Assistance required to implement solutions  Insights: Decreased awareness of deficits  Initiation: Requires cues for some  Sequencing: Requires cues for some  Cognition Comment: Continued difficulty processing and verbalizing ideas/needs; poor problem-solving ability    Activities of Daily Living    Grooming/Oral Hygiene  Assistance Level: Contact guard assist  Skilled Clinical Factors: Standing at sink with rw for hand hygiene. Verbal cuing for safety.    Toileting  Assistance Level: Contact guard assist  Skilled Clinical Factors: Pt voids on toilet. CGA provided while pt managed clothing down past hips. Was able to complete hygiene after voiding and after BM while seated and weight shifting. Verbal cuing required for hand placement and technique with rw while pivoting in front of

## 2025-01-23 NOTE — PROGRESS NOTES
Physical Medicine & Rehabilitation  Progress Note      Subjective:      69 year-old female with encephalopathy with impaired cognition. Patient is doing well today. She continues to exhibit impaired cognition. She currently denies pain. She is participating with therapies. Staff reports she continues to sleep well with melatonin.    ROS:  Denies fevers, chills, sweats.  No chest pain, palpitations, lightheadedness.  Denies coughing, wheezing or shortness of breath.  Denies abdominal pain, nausea, diarrhea or constipation.  No new areas of joint pain.  Denies new areas of numbness or weakness.  Denies new anxiety or depression issues.  No new skin problems.    Rehabilitation:     PT:    Bed mobility  Bridging: Supervision  Rolling to Left: Supervision  Rolling to Right: Supervision  Supine to Sit: Minimal assistance (States L elbow is too sore to press up onto.)  Sit to Supine: Supervision  Scooting: Supervision (hips laterally on mat)  Bed Mobility Comments: Mat, wedge, 2 pillows. Entering/exiting to Pt's L.         Transfers  Sit to Stand: Contact guard assistance  Stand to Sit: Contact guard assistance (Cues for eccentric control; no carryover.)  Bed to Chair: Contact guard assistance (stand pivot w/o device; Pt seeks UE support.)  Stand Pivot Transfers: Contact guard assistance (Cues for technique, hand placement, and sequencing)  Comment: RW, unless noted otherwise.         Ambulation  Surface: Level tile  Device: Moore rail  Assistance: Minimal assistance  Quality of Gait: Unsteady, lacks bilateral TKE, flexed posture & downward gaze, NBOS with mild scissoring gait. Decreased step length RLE  Gait Deviations: Slow Asia, Decreased step length, Decreased step height, Shuffles, Decreased head and trunk rotation, Decreased arm swing  Distance: 40'  Comments: Hand-held A on side opposite rail.  More Ambulation?: Yes  Ambulation 2  Surface - 2: level tile  Device 2: Rolling Walker  Assistance 2: Minimal

## 2025-01-24 PROCEDURE — 97530 THERAPEUTIC ACTIVITIES: CPT

## 2025-01-24 PROCEDURE — 99232 SBSQ HOSP IP/OBS MODERATE 35: CPT | Performed by: INTERNAL MEDICINE

## 2025-01-24 PROCEDURE — 6360000002 HC RX W HCPCS

## 2025-01-24 PROCEDURE — 6370000000 HC RX 637 (ALT 250 FOR IP)

## 2025-01-24 PROCEDURE — 6370000000 HC RX 637 (ALT 250 FOR IP): Performed by: GENERAL PRACTICE

## 2025-01-24 PROCEDURE — 99233 SBSQ HOSP IP/OBS HIGH 50: CPT | Performed by: PHYSICAL MEDICINE & REHABILITATION

## 2025-01-24 PROCEDURE — 6370000000 HC RX 637 (ALT 250 FOR IP): Performed by: PHYSICAL MEDICINE & REHABILITATION

## 2025-01-24 PROCEDURE — 1180000000 HC REHAB R&B

## 2025-01-24 PROCEDURE — 97129 THER IVNTJ 1ST 15 MIN: CPT

## 2025-01-24 PROCEDURE — 97116 GAIT TRAINING THERAPY: CPT

## 2025-01-24 PROCEDURE — 97130 THER IVNTJ EA ADDL 15 MIN: CPT

## 2025-01-24 PROCEDURE — 97535 SELF CARE MNGMENT TRAINING: CPT

## 2025-01-24 PROCEDURE — 97110 THERAPEUTIC EXERCISES: CPT

## 2025-01-24 RX ADMIN — LISINOPRIL 40 MG: 20 TABLET ORAL at 07:54

## 2025-01-24 RX ADMIN — LEVOTHYROXINE SODIUM 100 MCG: 0.1 TABLET ORAL at 05:09

## 2025-01-24 RX ADMIN — ACETAMINOPHEN 650 MG: 325 TABLET ORAL at 12:45

## 2025-01-24 RX ADMIN — ATORVASTATIN CALCIUM 80 MG: 80 TABLET, FILM COATED ORAL at 19:48

## 2025-01-24 RX ADMIN — POLYETHYLENE GLYCOL 3350 17 G: 17 POWDER, FOR SOLUTION ORAL at 07:54

## 2025-01-24 RX ADMIN — Medication 6 MG: at 19:48

## 2025-01-24 RX ADMIN — ASPIRIN 81 MG: 81 TABLET, COATED ORAL at 07:54

## 2025-01-24 RX ADMIN — CARVEDILOL 6.25 MG: 6.25 TABLET, FILM COATED ORAL at 07:54

## 2025-01-24 RX ADMIN — MICONAZOLE NITRATE: 20 POWDER TOPICAL at 19:50

## 2025-01-24 RX ADMIN — DONEPEZIL HYDROCHLORIDE 5 MG: 5 TABLET, FILM COATED ORAL at 19:52

## 2025-01-24 RX ADMIN — ACETAMINOPHEN 650 MG: 325 TABLET ORAL at 19:48

## 2025-01-24 RX ADMIN — AMLODIPINE BESYLATE 10 MG: 10 TABLET ORAL at 07:54

## 2025-01-24 RX ADMIN — ENOXAPARIN SODIUM 40 MG: 100 INJECTION SUBCUTANEOUS at 07:54

## 2025-01-24 ASSESSMENT — PAIN DESCRIPTION - ONSET: ONSET: ON-GOING

## 2025-01-24 ASSESSMENT — PAIN DESCRIPTION - LOCATION
LOCATION: KNEE
LOCATION: KNEE

## 2025-01-24 ASSESSMENT — PAIN SCALES - GENERAL
PAINLEVEL_OUTOF10: 8
PAINLEVEL_OUTOF10: 5
PAINLEVEL_OUTOF10: 9

## 2025-01-24 ASSESSMENT — PAIN DESCRIPTION - ORIENTATION
ORIENTATION: RIGHT;LEFT
ORIENTATION: RIGHT;LEFT

## 2025-01-24 ASSESSMENT — PAIN DESCRIPTION - DESCRIPTORS
DESCRIPTORS: ACHING;DISCOMFORT
DESCRIPTORS: ACHING

## 2025-01-24 ASSESSMENT — PAIN DESCRIPTION - PAIN TYPE: TYPE: ACUTE PAIN

## 2025-01-24 ASSESSMENT — PAIN DESCRIPTION - FREQUENCY: FREQUENCY: INTERMITTENT

## 2025-01-24 NOTE — PROGRESS NOTES
Physical Medicine & Rehabilitation  Progress Note      Subjective:      69 year-old female with encephalopathy with impaired cognition. Patient is noting good sleep last night. She is having intermittent aching knee pain which reportedly responds to prn Tylenol. She is participating with therapies and progressing physically but cognition continues to negatively affect ADLs, safety and self care.     ROS:  Denies fevers, chills, sweats.  No chest pain, palpitations, lightheadedness.  Denies coughing, wheezing or shortness of breath.  Denies abdominal pain, nausea, diarrhea or constipation.  No new areas of joint pain.  Denies new areas of numbness or weakness.  Denies new anxiety or depression issues.  No new skin problems.    Rehabilitation:     PT:    Bed mobility  Bridging: Supervision  Rolling to Left: Supervision  Rolling to Right: Supervision  Supine to Sit: Minimal assistance (States L elbow is too sore to press up onto.)  Sit to Supine: Supervision  Scooting: Supervision (hips laterally on mat)  Bed Mobility Comments: Mat, wedge, 2 pillows. Entering/exiting to Pt's L.         Transfers  Sit to Stand: Contact guard assistance  Stand to Sit: Contact guard assistance (Cues for eccentric control; no carryover.)  Bed to Chair: Contact guard assistance (stand pivot w/o device; Pt seeks UE support.)  Stand Pivot Transfers: Contact guard assistance (Cues for technique, hand placement, and sequencing)  Comment: All transfers with RW. Repetitive verbal and tactile cues required for proper hand placement as pt tends to pull from RW.         Ambulation  Surface: Level tile  Device: Rolling Walker  Assistance: Contact guard assistance  Quality of Gait: Unsteady, lacks bilateral TKE, flexed posture & downward gaze, NBOS with mild scissoring gait. Decreased step length RLE  Gait Deviations: Slow Asia, Decreased step length, Decreased step height, Shuffles, Decreased head and trunk rotation, Decreased arm swing  Distance:

## 2025-01-24 NOTE — PROGRESS NOTES
[facility] Internal Medicine  Miles Lundberg MD; Chad Gordon MD; Uriel Elliott MD; MD Nely Villa MD; Bonita Argueta MD    [facility] Internal Medicine   IN-PATIENT SERVICE   [facility]    Progress note            Date:   1/24/2025  Patient name:  Lanny Jaimes  Date of admission:  1/17/2025  7:18 PM  MRN:   [id number]  Account:  [id number]  YOB: 1955  PCP:    No primary care provider on file.  Room:   [address] [place] [address]  Code Status:    Full Code    Chief Complaint:     No chief complaint on file.  Intracranial hemorrhage    History Obtained From:     Patient medical record nursing staff    History of Present Illness:     Lanny Jaimes is a 69 y.o. Non- / non  female who presents with No chief complaint on file.   and is admitted to the hospital for the management of Encephalopathy.    69-year-old  lady was transferred from [facility] to [facility] with altered mental status found to have small left parietal hemorrhagic contusion after a fall at home  MRI brain was done which shows consistent with hypertensive microangiopathy on presentation patient's systolic blood pressure was over 220  Repeat CTA head and neck showed left ICA cavernous aneurysm neurointervention was consulted has angiogram done which shows right posterior communicating infundibulum and left cavernous medially saccular aneurysm plan for outpatient intervention for left cavernous ICA aneurysm  For deconditioning patient was transferred to acute rehab for further therapy    Past Medical History:     Past Medical History:   Diagnosis Date    Thyroid disease         Past Surgical History:     No past surgical history on file.     Medications Prior to Admission:     Prior to Admission medications    Medication Sig Start Date End Date Taking? Authorizing Provider   levothyroxine (SYNTHROID) 88 MCG tablet Take 1 tablet by mouth Daily

## 2025-01-24 NOTE — PLAN OF CARE
Problem: Discharge Planning  Goal: Discharge to home or other facility with appropriate resources  1/24/2025 0020 by Jez Mejia, RN  Outcome: Progressing     Problem: Skin/Tissue Integrity  Goal: Absence of new skin breakdown  Description: 1.  Monitor for areas of redness and/or skin breakdown  2.  Assess vascular access sites hourly  3.  Every 4-6 hours minimum:  Change oxygen saturation probe site  4.  Every 4-6 hours:  If on nasal continuous positive airway pressure, respiratory therapy assess nares and determine need for appliance change or resting period.  1/24/2025 0020 by Jez Mejia, RN  Outcome: Progressing     Problem: Safety - Adult  Goal: Free from fall injury  1/24/2025 0020 by Jez Mejia, RN  Outcome: Progressing     Problem: ABCDS Injury Assessment  Goal: Absence of physical injury  1/24/2025 0020 by Jez Mejia, RN  Outcome: Progressing     Problem: Pain  Goal: Verbalizes/displays adequate comfort level or baseline comfort level  1/24/2025 0020 by Jez Mejia, RN  Outcome: Progressing

## 2025-01-24 NOTE — PROGRESS NOTES
[facility]   Acute Rehabilitation Occupational Therapy Daily Treatment Note    Date: 25  Patient Name: Lanny Jaimes       Room: 2619/2619-01  MRN: [id number]  Account: [id number]   : 1955  (69 y.o.) Gender: female       Referring Practitioner: Dr. Daniela Garcia  Diagnosis: Encephalopathy  Additional Pertinent Hx: Per chart: Ms. Lanny Jaimes is a 69 y.o. right handed female who was admitted to [facility] on 2025 with Fall (Frequent falls ) and Altered Mental Status.  Patient admitted from [facility] after fall from standing height. Small hemorrhagic parietal lobe contusion found and he was transferred to [facility] for trauma and neurosurgery care.Neuro ICU: MRI showing microhemorrhage consistent with possible cerebral amyloid angiopathy. Small L parietal IPH with possible hemorrhagic contusion vs CAA.      Neurosurgery: confirmed multiple foci of microhemorrhage and diffusion restriction with small enhancing lesion R parietal lobe. No indication for surgical intervention. Recommending repeat MRI in a few months and signed off.Neuro endovascular: recommending strict hypertension management with SBP < 140mm Hg for hypertensive angiopathy. No current indication for antiplatelets. Having altered mentation/encephalopathy. DSA performed 25 with finding of L cavernous saccular aneurysm and plan for procedure in the next month or two.    Treatment Diagnosis: Impaired self-care status    Past Medical History:  has a past medical history of Thyroid disease.    Past Surgical History:   has no past surgical history on file.    Restrictions  Restrictions/Precautions  Restrictions/Precautions: Fall Risk, Bed Alarm, General Precautions  Activity Level: Up as Tolerated, Up with Assist  Required Braces or Orthoses?: No     Position Activity Restriction  Other Position/Activity Restrictions: SBP <140, activity as tolerated     Vitals      Subjective  Subjective  Subjective: AM:

## 2025-01-24 NOTE — PROGRESS NOTES
SPEECH LANGUAGE PATHOLOGY  Speech Language Pathology  [facility]    Cognitive Treatment Note    Date: 2025  Patient’s Name: Lanny Jaimes  MRN: [id number]  Diagnosis:   Patient Active Problem List   Diagnosis Code    Focal hemorrhagic contusion of cerebrum S06.33AA    Acute intracranial hemorrhage (HCC) I62.9    Cerebral amyloid angiopathy (HCC) E85.4, I68.0    Acquired hypothyroidism E03.9    Primary hypertension I10    Fall W19.XXXA    Aneurysm of cavernous portion of left internal carotid artery I67.1    Hypertensive urgency I16.0    Encephalopathy G93.40    Major neurocognitive disorder (HCC) F03.90       Pain Rating: no c/o pain  Pain Location: N/A  Non-Pharmaceutical Pain Intervention: N/A    Cognitive Treatment    Treatment time:       SLP Individual Minutes  Time In: 1104  Time Out: 1135  Minutes: 31     SLP Individual Minutes  Time In: 1337 (session delayed due to Pt using restroom)  Time Out: 1404  Minutes: 27       Subjective: [x] Alert [x] Cooperative     [] Confused     [] Agitated    [] Lethargic      Objective/Assessment:  Attention: ST modified environment to reduce distractions. Pt required prolonged processing/response time and often required repeated stimuli due to inability to maintain attention to structured tasks.     Orientation: x1/4 temporal, 0/2 spatial concepts.     Recall: Delayed recall: 0/3 (I), 1/3 mod A.     Organization: Convergent namin% MI, Pt required max A to improved accuracy to 70%. Pt stated \"I hate these.\" ST offered encouragement as Pt became frustrated when encountering difficulty.     Problem Solving/Reasoning: Word deduction: 20% (I) improved to 45% with max A.     Other: Pt perseverating on needing \"a new emperatriz\" to \"make [her] life easier.\" ST assisted Pt to utilize calendar emperatriz on her phone to record upcoming events/appointments and set reminders. Upon further questioning, Pt able to state she wanted an emperatriz for exercises similar to the ones she does in PT/OT in

## 2025-01-24 NOTE — CARE COORDINATION
ARU CASE MANAGEMENT NOTE:    Admission Date:  1/17/2025 Lanny Jaimes is a 69 y.o.  female    Admitted for : Encephalopathy [G93.40]    Expected Discharge Date: 01/28/25 (Pending SNF Placement)    Spoke with [facility] regarding acceptance of patient and auth initiation for SNF. Acceptance of patient was confirmed and the plan is to initiate authorization today through [employer]. Facility provided with planned dc date of Tuesday, 1/28.     Son, Sha, to meet with  today regarding patient and care plan.    Outside appointments while in ARU: None    DME:N/A    Will continue to follow for additional discharge needs.    Electronically signed by [facility] Martinez RN on 1/24/2025 at 11:50 AM

## 2025-01-24 NOTE — PROGRESS NOTES
Physical Therapy  [facility]   Acute Rehabilitation Physical Therapy Treatment    Date: 25  Patient Name: Lanny Jaimes       Room: 2619/2619-01  MRN: [id number]  Account: [id number]   : 1955  (69 y.o.) Gender: female     Response To Previous Treatment: Patient with no complaints from previous session.  Family/Caregiver Present: No  Follows Commands: Impaired  Other (Comment): Delayed processing, increased time and repetition required     Past Medical History:  has a past medical history of Thyroid disease.  Past Surgical History:   has no past surgical history on file.    Restrictions  Restrictions/Precautions  Restrictions/Precautions: Fall Risk, Bed Alarm, General Precautions  Activity Level: Up as Tolerated, Up with Assist  Required Braces or Orthoses?: No  Position Activity Restriction  Other Position/Activity Restrictions: SBP <140, activity as tolerated    Subjective  Subjective  Subjective: Patient continues to be pleasantly confused. Agreeable to PT treatment sessions with no new complaints.  Pain  Pre-Pain: 0  Post-Pain: 0     Objective  Orientation  Overall Orientation Status: Impaired  Orientation Level: Oriented to person;Disoriented to place;Disoriented to situation;Disoriented to time  Cognition  Overall Cognitive Status: Exceptions  Arousal/Alertness: Inconsistent responses to stimuli  Following Commands: Follows one step commands with increased time;Follows one step commands with repetition  Attention Span: Attends with cues to redirect;Difficulty dividing attention  Memory: Decreased recall of recent events;Decreased recall of biographical Information  Safety Judgement: Decreased awareness of need for assistance;Decreased awareness of need for safety  Problem Solving: Assistance required to generate solutions;Assistance required to implement solutions  Insights: Decreased awareness of deficits  Initiation: Requires cues for some  Sequencing: Requires cues for some

## 2025-01-25 LAB
ANION GAP SERPL CALCULATED.3IONS-SCNC: 10 MMOL/L (ref 9–16)
BASOPHILS # BLD: 0 K/UL (ref 0–0.2)
BASOPHILS NFR BLD: 0 % (ref 0–2)
BUN SERPL-MCNC: 27 MG/DL (ref 8–23)
CALCIUM SERPL-MCNC: 9.1 MG/DL (ref 8.6–10.4)
CHLORIDE SERPL-SCNC: 107 MMOL/L (ref 98–107)
CO2 SERPL-SCNC: 26 MMOL/L (ref 20–31)
CREAT SERPL-MCNC: 0.7 MG/DL (ref 0.7–1.2)
EOSINOPHIL # BLD: 0.2 K/UL (ref 0–0.4)
EOSINOPHILS RELATIVE PERCENT: 2 % (ref 0–4)
ERYTHROCYTE [DISTWIDTH] IN BLOOD BY AUTOMATED COUNT: 16.4 % (ref 11.5–14.9)
GFR, ESTIMATED: >90 ML/MIN/1.73M2
GLUCOSE SERPL-MCNC: 92 MG/DL (ref 74–99)
HCT VFR BLD AUTO: 33.9 % (ref 36–46)
HGB BLD-MCNC: 11.2 G/DL (ref 12–16)
LYMPHOCYTES NFR BLD: 1.4 K/UL (ref 1–4.8)
LYMPHOCYTES RELATIVE PERCENT: 17 % (ref 24–44)
MCH RBC QN AUTO: 31.3 PG (ref 26–34)
MCHC RBC AUTO-ENTMCNC: 33.2 G/DL (ref 31–37)
MCV RBC AUTO: 94.3 FL (ref 80–100)
MONOCYTES NFR BLD: 0.8 K/UL (ref 0.1–1.3)
MONOCYTES NFR BLD: 9 % (ref 1–7)
NEUTROPHILS NFR BLD: 72 % (ref 36–66)
NEUTS SEG NFR BLD: 6.3 K/UL (ref 1.3–9.1)
PLATELET # BLD AUTO: 334 K/UL (ref 150–450)
PMV BLD AUTO: 8.1 FL (ref 6–12)
POTASSIUM SERPL-SCNC: 4 MMOL/L (ref 3.7–5.3)
RBC # BLD AUTO: 3.59 M/UL (ref 4–5.2)
SODIUM SERPL-SCNC: 143 MMOL/L (ref 136–145)
T4 FREE SERPL-MCNC: 0.8 NG/DL (ref 0.9–1.7)
TSH SERPL DL<=0.05 MIU/L-ACNC: 21.2 UIU/ML (ref 0.27–4.2)
WBC OTHER # BLD: 8.7 K/UL (ref 3.5–11)

## 2025-01-25 PROCEDURE — 6370000000 HC RX 637 (ALT 250 FOR IP)

## 2025-01-25 PROCEDURE — 84439 ASSAY OF FREE THYROXINE: CPT

## 2025-01-25 PROCEDURE — 36415 COLL VENOUS BLD VENIPUNCTURE: CPT

## 2025-01-25 PROCEDURE — 97110 THERAPEUTIC EXERCISES: CPT

## 2025-01-25 PROCEDURE — 97530 THERAPEUTIC ACTIVITIES: CPT

## 2025-01-25 PROCEDURE — 1180000000 HC REHAB R&B

## 2025-01-25 PROCEDURE — 99232 SBSQ HOSP IP/OBS MODERATE 35: CPT | Performed by: PHYSICAL MEDICINE & REHABILITATION

## 2025-01-25 PROCEDURE — 97116 GAIT TRAINING THERAPY: CPT

## 2025-01-25 PROCEDURE — 6370000000 HC RX 637 (ALT 250 FOR IP): Performed by: PHYSICAL MEDICINE & REHABILITATION

## 2025-01-25 PROCEDURE — 80048 BASIC METABOLIC PNL TOTAL CA: CPT

## 2025-01-25 PROCEDURE — 97130 THER IVNTJ EA ADDL 15 MIN: CPT

## 2025-01-25 PROCEDURE — 97535 SELF CARE MNGMENT TRAINING: CPT

## 2025-01-25 PROCEDURE — 84443 ASSAY THYROID STIM HORMONE: CPT

## 2025-01-25 PROCEDURE — 92507 TX SP LANG VOICE COMM INDIV: CPT

## 2025-01-25 PROCEDURE — 85025 COMPLETE CBC W/AUTO DIFF WBC: CPT

## 2025-01-25 PROCEDURE — 6360000002 HC RX W HCPCS

## 2025-01-25 PROCEDURE — 6370000000 HC RX 637 (ALT 250 FOR IP): Performed by: GENERAL PRACTICE

## 2025-01-25 PROCEDURE — 99232 SBSQ HOSP IP/OBS MODERATE 35: CPT | Performed by: INTERNAL MEDICINE

## 2025-01-25 PROCEDURE — 97129 THER IVNTJ 1ST 15 MIN: CPT

## 2025-01-25 RX ORDER — LEVOTHYROXINE SODIUM 125 UG/1
125 TABLET ORAL DAILY
Status: DISCONTINUED | OUTPATIENT
Start: 2025-01-26 | End: 2025-01-30 | Stop reason: HOSPADM

## 2025-01-25 RX ADMIN — AMLODIPINE BESYLATE 10 MG: 10 TABLET ORAL at 07:17

## 2025-01-25 RX ADMIN — ASPIRIN 81 MG: 81 TABLET, COATED ORAL at 07:17

## 2025-01-25 RX ADMIN — CARVEDILOL 6.25 MG: 6.25 TABLET, FILM COATED ORAL at 07:17

## 2025-01-25 RX ADMIN — CARVEDILOL 6.25 MG: 6.25 TABLET, FILM COATED ORAL at 16:45

## 2025-01-25 RX ADMIN — LISINOPRIL 40 MG: 20 TABLET ORAL at 07:17

## 2025-01-25 RX ADMIN — IBUPROFEN 800 MG: 800 TABLET, FILM COATED ORAL at 07:28

## 2025-01-25 RX ADMIN — DONEPEZIL HYDROCHLORIDE 10 MG: 10 TABLET, FILM COATED ORAL at 21:13

## 2025-01-25 RX ADMIN — Medication 6 MG: at 21:13

## 2025-01-25 RX ADMIN — LEVOTHYROXINE SODIUM 100 MCG: 0.1 TABLET ORAL at 05:18

## 2025-01-25 RX ADMIN — ATORVASTATIN CALCIUM 80 MG: 80 TABLET, FILM COATED ORAL at 21:13

## 2025-01-25 RX ADMIN — ACETAMINOPHEN 650 MG: 325 TABLET ORAL at 05:18

## 2025-01-25 RX ADMIN — POLYETHYLENE GLYCOL 3350 17 G: 17 POWDER, FOR SOLUTION ORAL at 07:17

## 2025-01-25 RX ADMIN — MICONAZOLE NITRATE: 20 POWDER TOPICAL at 21:13

## 2025-01-25 RX ADMIN — ENOXAPARIN SODIUM 40 MG: 100 INJECTION SUBCUTANEOUS at 07:17

## 2025-01-25 RX ADMIN — MICONAZOLE NITRATE: 20 POWDER TOPICAL at 07:18

## 2025-01-25 RX ADMIN — IBUPROFEN 800 MG: 800 TABLET, FILM COATED ORAL at 16:45

## 2025-01-25 ASSESSMENT — PAIN SCALES - GENERAL
PAINLEVEL_OUTOF10: 6
PAINLEVEL_OUTOF10: 8
PAINLEVEL_OUTOF10: 8
PAINLEVEL_OUTOF10: 0
PAINLEVEL_OUTOF10: 8
PAINLEVEL_OUTOF10: 8
PAINLEVEL_OUTOF10: 2

## 2025-01-25 ASSESSMENT — PAIN DESCRIPTION - ORIENTATION
ORIENTATION: RIGHT;LEFT
ORIENTATION: LEFT;RIGHT
ORIENTATION: RIGHT;LEFT

## 2025-01-25 ASSESSMENT — PAIN DESCRIPTION - LOCATION
LOCATION: HIP;KNEE
LOCATION: KNEE
LOCATION: KNEE;HIP

## 2025-01-25 ASSESSMENT — PAIN - FUNCTIONAL ASSESSMENT
PAIN_FUNCTIONAL_ASSESSMENT: ACTIVITIES ARE NOT PREVENTED

## 2025-01-25 ASSESSMENT — PAIN DESCRIPTION - DESCRIPTORS
DESCRIPTORS: ACHING

## 2025-01-25 NOTE — PLAN OF CARE
Problem: Skin/Tissue Integrity  Goal: Absence of new skin breakdown  Description: 1.  Monitor for areas of redness and/or skin breakdown  2.  Assess vascular access sites hourly  3.  Every 4-6 hours minimum:  Change oxygen saturation probe site  4.  Every 4-6 hours:  If on nasal continuous positive airway pressure, respiratory therapy assess nares and determine need for appliance change or resting period.  Outcome: Progressing     Problem: Safety - Adult  Goal: Free from fall injury  Outcome: Progressing     Problem: Pain  Goal: Verbalizes/displays adequate comfort level or baseline comfort level  Outcome: Progressing

## 2025-01-25 NOTE — PLAN OF CARE
Problem: Discharge Planning  Goal: Discharge to home or other facility with appropriate resources  1/25/2025 0908 by Shruthi Carrion LPN  Outcome: Progressing  1/24/2025 2239 by Kaylee Jacobson RN  Outcome: Progressing  Flowsheets (Taken 1/24/2025 0900 by Courtney Mark, RN)  Discharge to home or other facility with appropriate resources:   Identify barriers to discharge with patient and caregiver   Arrange for needed discharge resources and transportation as appropriate   Identify discharge learning needs (meds, wound care, etc)   Refer to discharge planning if patient needs post-hospital services based on physician order or complex needs related to functional status, cognitive ability or social support system     Problem: Skin/Tissue Integrity  Goal: Absence of new skin breakdown  Description: 1.  Monitor for areas of redness and/or skin breakdown  2.  Assess vascular access sites hourly  3.  Every 4-6 hours minimum:  Change oxygen saturation probe site  4.  Every 4-6 hours:  If on nasal continuous positive airway pressure, respiratory therapy assess nares and determine need for appliance change or resting period.  1/25/2025 0908 by Shruthi Carrion LPN  Outcome: Progressing  1/24/2025 2239 by Kaylee Jacobson RN  Outcome: Progressing     Problem: Safety - Adult  Goal: Free from fall injury  1/25/2025 0908 by Shruthi Carrion LPN  Outcome: Progressing  1/24/2025 2239 by Kaylee Jacobson RN  Outcome: Progressing     Problem: ABCDS Injury Assessment  Goal: Absence of physical injury  1/25/2025 0908 by Shruthi Carrion LPN  Outcome: Progressing  1/24/2025 2239 by Kaylee Jacobson RN  Outcome: Progressing     Problem: Pain  Goal: Verbalizes/displays adequate comfort level or baseline comfort level  1/25/2025 0908 by Shruthi Carrion LPN  Outcome: Progressing  1/24/2025 2239 by Kaylee Jacobson RN  Outcome: Progressing

## 2025-01-25 NOTE — PROGRESS NOTES
[facility] Internal Medicine  Miles Lundberg MD; Chad Gordon MD; Uriel Elliott MD; MD Nely Villa MD; Bonita Argueta MD    [facility] Internal Medicine   IN-PATIENT SERVICE   [facility]    Progress note            Date:   1/25/2025  Patient name:  Lanny Jaimes  Date of admission:  1/17/2025  7:18 PM  MRN:   [id number]  Account:  [id number]  YOB: 1955  PCP:    No primary care provider on file.  Room:   [address] [place] [address]  Code Status:    Full Code    Chief Complaint:     No chief complaint on file.  Intracranial hemorrhage    History Obtained From:     Patient medical record nursing staff    History of Present Illness:     Lanny Jaimes is a 69 y.o. Non- / non  female who presents with No chief complaint on file.   and is admitted to the hospital for the management of Encephalopathy.    69-year-old  lady was transferred from [facility] to [facility] with altered mental status found to have small left parietal hemorrhagic contusion after a fall at home  MRI brain was done which shows consistent with hypertensive microangiopathy on presentation patient's systolic blood pressure was over 220  Repeat CTA head and neck showed left ICA cavernous aneurysm neurointervention was consulted has angiogram done which shows right posterior communicating infundibulum and left cavernous medially saccular aneurysm plan for outpatient intervention for left cavernous ICA aneurysm  For deconditioning patient was transferred to acute rehab for further therapy    Past Medical History:     Past Medical History:   Diagnosis Date    Thyroid disease         Past Surgical History:     No past surgical history on file.     Medications Prior to Admission:     Prior to Admission medications    Medication Sig Start Date End Date Taking? Authorizing Provider   levothyroxine (SYNTHROID) 88 MCG tablet Take 1 tablet by mouth Daily

## 2025-01-25 NOTE — PROGRESS NOTES
Physical Medicine & Rehabilitation  Progress Note    1/25/2025 11:35 AM     CC: Ambulatory and ADL dysfunction due to encephalopathy with impaired cognition    Subjective:   No complaints from patient or staff.  Working with therapy.  Appears in good spirits    ROS:  Denies fevers, chills, sweats.  No chest pain, palpitations, lightheadedness.  Denies coughing, wheezing or shortness of breath.  Denies abdominal pain, nausea, diarrhea or constipation.  No new areas of joint pain.  Denies new areas of numbness or weakness.  Denies new anxiety or depression issues.  No new skin problems.    Rehabilitation:   PT:    Bed mobility  Bridging: Supervision  Rolling to Left: Supervision  Rolling to Right: Supervision  Supine to Sit: Contact guard assistance (use of hand rail)  Sit to Supine: Supervision  Scooting: Supervision  Bed Mobility Comments: Mat, wedge, 2 pillows. Entering/exiting to Pt's L.    Transfers  Sit to Stand: Contact guard assistance (education for hand placement, always attempts to pull from walker, slow to process correct sequence.)  Stand to Sit: Contact guard assistance (decreased eccentric control, education for hand placement)  Bed to Chair: Contact guard assistance  Stand Pivot Transfers: Contact guard assistance (c walker)  Comment: All transfers with RW. Repetitive verbal and tactile cues required for proper hand placement as pt tends to pull from RW.    Ambulation  Surface: Level tile  Device: Rolling Walker  Assistance: Contact guard assistance  Quality of Gait: Unsteady, lacks bilateral TKE, flexed posture & downward gaze, NBOS . Decreased step length RLE  Gait Deviations: Slow Asia, Decreased step length, Decreased step height, Shuffles, Decreased head and trunk rotation (Genu Varus)  Distance: 150 ft c 2 turns.  Comments: tactile education needed at times, easily distracted by people around her.  More Ambulation?: Yes  Ambulation 2  Surface - 2: level tile, ramp  Device 2: Rolling  and during transfers. CGA during management of clothing back up over hips.  Toilet Transfers  Technique:  (Ambulating with rw.)  Equipment: Standard toilet, Grab bars  Additional Factors: Verbal cues, Cues for hand placement, Increased time to complete  Assistance Level: Minimal assistance  Skilled Clinical Factors: Osbaldo for assistance with RW positioning; pt not keeping RW in front of her while transferring and poses safety concern with continued poor BUE placement. CGA for the sit/stand to/from toilet.      ST:    Subjective: [x] Alert     [x] Cooperative     [] Confused     [] Agitated    [] Lethargic        Objective/Assessment:  Attention: ST modified environment to reduce distractions. Pt required prolonged processing/response time and often required repeated stimuli due to inability to maintain attention to structured tasks.      Orientation: x1/4 temporal, 0/2 spatial concepts.      Recall: Delayed recall: 0/3 (I), 1/3 mod A.      Organization: Convergent namin% MI, Pt required max A to improved accuracy to 70%. Pt stated \"I hate these.\" ST offered encouragement as Pt became frustrated when encountering difficulty.      Problem Solving/Reasoning: Word deduction: 20% (I) improved to 45% with max A.      Other: Pt perseverating on needing \"a new emperatriz\" to \"make [her] life easier.\" ST assisted Pt to utilize calendar emperatriz on her phone to record upcoming events/appointments and set reminders. Upon further questioning, Pt able to state she wanted an emperatriz for exercises similar to the ones she does in PT/OT in order to help her remember to do them. ST requested PT/OT to provided Pt with copies of exercise program.       Plan:  [x] Continue ST services    [] Discharge from ST:      Objective:  /75   Pulse 63   Temp 97.5 °F (36.4 °C) (Oral)   Resp 16   Ht 1.702 m (5' 7.01\")   SpO2 96%   BMI 23.33 kg/m²  I Body mass index is 23.33 kg/m². I   Wt Readings from Last 1 Encounters:   25 67.6 kg (149 lb)

## 2025-01-25 NOTE — PROGRESS NOTES
[facility]   Acute Rehabilitation Occupational Therapy Daily Treatment Note    Date: 25  Patient Name: Lanny Jaimes       Room: 2619/2619-01  MRN: [id number]  Account: [id number]   : 1955  (69 y.o.) Gender: female       Referring Practitioner: Dr. Daniela Garcia  Diagnosis: Encephalopathy  Additional Pertinent Hx: Per chart: Ms. Lanny Jaimes is a 69 y.o. right handed female who was admitted to [facility] on 2025 with Fall (Frequent falls ) and Altered Mental Status.  Patient admitted from [facility] after fall from standing height. Small hemorrhagic parietal lobe contusion found and he was transferred to [place] for trauma and neurosurgery care.Neuro ICU: MRI showing microhemorrhage consistent with possible cerebral amyloid angiopathy. Small L parietal IPH with possible hemorrhagic contusion vs CAA.      Neurosurgery: confirmed multiple foci of microhemorrhage and diffusion restriction with small enhancing lesion R parietal lobe. No indication for surgical intervention. Recommending repeat MRI in a few months and signed off.Neuro endovascular: recommending strict hypertension management with SBP < 140mm Hg for hypertensive angiopathy. No current indication for antiplatelets. Having altered mentation/encephalopathy. DSA performed 25 with finding of L cavernous saccular aneurysm and plan for procedure in the next month or two.    Treatment Diagnosis: Impaired self-care status    Past Medical History:  has a past medical history of Thyroid disease.    Past Surgical History:   has no past surgical history on file.    Restrictions  Restrictions/Precautions  Restrictions/Precautions: Fall Risk, Bed Alarm, General Precautions  Activity Level: Up as Tolerated, Up with Assist  Required Braces or Orthoses?: No     Position Activity Restriction  Other Position/Activity Restrictions: SBP <140, activity as tolerated          Vitals  Vital Signs  O2 Device: None (Room air)      Subjective  Subjective  Subjective: \"Thank you, thank you, thank you.\"  Pain Assessment  Pain Assessment: 0-10  Pain Level: 8  Pain Location: Knee  Pain Orientation: Right;Left    Objective  Cognition  Overall Orientation Status: Impaired  Orientation Level: Oriented to person;Disoriented to place;Disoriented to situation;Disoriented to time    Cognition  Overall Cognitive Status: Exceptions  Arousal/Alertness: Inconsistent responses to stimuli  Following Commands: Follows one step commands with increased time;Follows one step commands with repetition  Attention Span: Attends with cues to redirect;Difficulty dividing attention  Memory: Decreased recall of recent events;Decreased recall of biographical Information  Safety Judgement: Decreased awareness of need for assistance;Decreased awareness of need for safety  Problem Solving: Assistance required to generate solutions;Assistance required to implement solutions  Insights: Decreased awareness of deficits  Initiation: Requires cues for some  Sequencing: Requires cues for some  Cognition Comment: Continued difficulty processing and verbalizing ideas/needs; poor problem-solving ability    Activities of Daily Living  Feeding  Assistance Level: Independent  Skilled Clinical Factors: Per pt report.         Lower Extremity Bathing  Assistance Level: Contact guard assist  Skilled Clinical Factors: With max encouragement and cues for thoroughness pt able to cleanse rohit/buttock area only. RW used for UE support as needed        Lower Extremity Dressing  Assistance Level: Contact guard assist  Skilled Clinical Factors: with increased time pt able to thread LE clothing and able to manage over hips with CGA for balance using RW       Toileting  Assistance Level: Contact guard assist  Skilled Clinical Factors: CGA for clothing mgt and to complete rohit/buttock hygiene post toileting- vc for increased cleanliness    Toilet Transfers  Technique:  (ambulating with

## 2025-01-25 NOTE — PROGRESS NOTES
Physical Therapy  Facility/Department: [facility] ACUTE REHAB  Rehabilitation Physical Therapy Progress Note.    NAME: Lanny Jaimes  : 1955 (69 y.o.)  MRN: [id number]  CODE STATUS: Full Code    Date of Service: 25      Past Medical History:   Diagnosis Date    Thyroid disease      No past surgical history on file.         Restrictions:  Restrictions/Precautions: Fall Risk;Bed Alarm;General Precautions  Position Activity Restriction  Other Position/Activity Restrictions: SBP <140, activity as tolerated     SUBJECTIVE  Subjective: No c/o pain noted by patient.       OBJECTIVE  Vision  Vision: Impaired  Vision Exceptions: Wears glasses at all times    Hearing  Hearing Exceptions: Hard of hearing/hearing concerns    Cognition  Overall Cognitive Status: Exceptions  Arousal/Alertness: Inconsistent responses to stimuli  Following Commands: Follows one step commands with increased time;Follows one step commands with repetition  Attention Span: Attends with cues to redirect;Difficulty dividing attention  Memory: Decreased recall of recent events;Decreased recall of biographical Information  Safety Judgement: Decreased awareness of need for assistance;Decreased awareness of need for safety  Problem Solving: Assistance required to generate solutions;Assistance required to implement solutions  Insights: Decreased awareness of deficits  Initiation: Requires cues for some  Sequencing: Requires cues for some         Functional Mobility  Bed mobility  Supine to Sit: Contact guard assistance (use of hand rail)  Sit to Supine: Supervision (no rail)  Scooting: Supervision    Transfers  Sit to Stand: Contact guard assistance (education for hand placement, always attempts to pull from walker, slow to process correct sequence.)  Stand to Sit: Contact guard assistance (decreased eccentric control, education for hand placement)  Stand Pivot Transfers: Contact guard assistance (c walker)  Comment: slow to process tasks, added time  decreased balance, endurance, cognition, mobility  Discharge Recommendations: Continue to assess pending progress;24 hour supervision or assist      GOALS  Short Term Goals  Time Frame for Short Term Goals: 5-7 days  Short Term Goal 1: pt able to perform bed mobility fom flat surface with SBA  Short Term Goal 2: pt able to transfer from various surfaces with SBA  Short Term Goal 3: pt able to ambulate with RW and CGA/SBA x 100ft  Short Term Goal 4: Improve standing dynamic balance to Fair for increased safety with ADLs/transfers  Short Term Goal 5: Pt will perform supine<>sit transfer with supervision.  Long Term Goals  Time Frame for Long Term Goals : at time of discharge  Long Term Goal 1: pt able to perform mod-I bd mobility  Long Term Goal 2: pt abl to transfer from various surfaces with supervision  Long Term Goal 3: pt able to ambulate on various surfaces with least restrictive device x 150ft with supervision  Long Term Goal 4: pt able to negotiate curb sized step with device and supervision for community mobility  Long Term Goal 5: pt able to demonstrate Fair+ Dynamic standing balance for improved safety of ADLs and mobility    PLAN OF CARE  Frequency: 1-2 treatment sessions per day, 5-7 days per week  Physical Therapy Plan  General Plan:  minutes of therapy at least 5 out of 7 days a week  Current Treatment Recommendations: Strengthening;Functional mobility training;Gait training;Stair training;Transfer training;Endurance training;Safety education & training;Patient/Caregiver education & training;Therapeutic activities;Home exercise program  Safety Devices  Type of Devices: Gait belt;All fall risk precautions in place;Patient at risk for falls;Left in chair;Chair alarm in place;Call light within reach    EDUCATION  Education  Education Given To: Patient  Education Provided: Role of Therapy;Home Exercise Program;Safety;Mobility Training;Transfer Training;Fall Prevention Strategies  Education Method:

## 2025-01-25 NOTE — PROGRESS NOTES
Speech Language Pathology  [facility] ACUTE REHAB    Cognitive Treatment Note    Date: 2025  Patient’s Name: Lanny Jaimes  MRN: [id number]  Diagnosis:   Patient Active Problem List   Diagnosis Code    Focal hemorrhagic contusion of cerebrum S06.33AA    Acute intracranial hemorrhage (HCC) I62.9    Cerebral amyloid angiopathy (HCC) E85.4, I68.0    Acquired hypothyroidism E03.9    Primary hypertension I10    Fall W19.XXXA    Aneurysm of cavernous portion of left internal carotid artery I67.1    Hypertensive urgency I16.0    Encephalopathy G93.40    Major neurocognitive disorder (HCC) F03.90       Pain Ratin/10, knees    Cognitive Treatment    Treatment time:       SLP Individual Minutes  Time In: 1003  Time Out: 1033  Minutes: 30       Subjective: [x] Alert [x] Cooperative     [] Confused     [] Agitated    [] Lethargic      Objective/Assessment:    Attention: ST shutting door upon arrival and muting television to reduce external distractions. Pt. Demo intermittent internal distraction t/o session and required occasional redirection to tasks.     Orientation: Oriented to name, age, , and president independently. Pt. Stating she is in the hospital, unable to provide name of hospital without min verbal cues. Pt. Stated she is in rehab because of \"my knees\", unable to elaborate. Pt. Oriented to month and JUANITO with min-mod verbal cues to utilize whiteboard. Oriented to year with binary choices. ST reinforcing use of whiteboard with pt. Demo fair carryover.     Recall: JUANITO, month following 1-minute delay: 2/2 with use of visual support (whiteboard), additional 2-minute delay: 0/2 increased to 2/2 with 1 self-correction for JUANITO and min verbal cues to utilize whiteboard.     Problem Solving/Reasoning: Inconsistencies in sentences: 7/10 increased to 10/10 with mod-max verbal/visual/phonemic cues.     Word Finding: Category members (naming by 1st letter): 1/6 increased to 6/6 with mod-max verbal/visual cues.     Other: Call

## 2025-01-26 PROCEDURE — 6370000000 HC RX 637 (ALT 250 FOR IP): Performed by: INTERNAL MEDICINE

## 2025-01-26 PROCEDURE — 97129 THER IVNTJ 1ST 15 MIN: CPT

## 2025-01-26 PROCEDURE — 99232 SBSQ HOSP IP/OBS MODERATE 35: CPT | Performed by: PHYSICAL MEDICINE & REHABILITATION

## 2025-01-26 PROCEDURE — 97530 THERAPEUTIC ACTIVITIES: CPT

## 2025-01-26 PROCEDURE — 99232 SBSQ HOSP IP/OBS MODERATE 35: CPT | Performed by: INTERNAL MEDICINE

## 2025-01-26 PROCEDURE — 6370000000 HC RX 637 (ALT 250 FOR IP)

## 2025-01-26 PROCEDURE — 97110 THERAPEUTIC EXERCISES: CPT

## 2025-01-26 PROCEDURE — 92507 TX SP LANG VOICE COMM INDIV: CPT

## 2025-01-26 PROCEDURE — 6370000000 HC RX 637 (ALT 250 FOR IP): Performed by: PHYSICAL MEDICINE & REHABILITATION

## 2025-01-26 PROCEDURE — 97116 GAIT TRAINING THERAPY: CPT

## 2025-01-26 PROCEDURE — 6360000002 HC RX W HCPCS

## 2025-01-26 PROCEDURE — 1180000000 HC REHAB R&B

## 2025-01-26 PROCEDURE — 97535 SELF CARE MNGMENT TRAINING: CPT

## 2025-01-26 RX ADMIN — MICONAZOLE NITRATE: 20 POWDER TOPICAL at 20:09

## 2025-01-26 RX ADMIN — CARVEDILOL 6.25 MG: 6.25 TABLET, FILM COATED ORAL at 07:09

## 2025-01-26 RX ADMIN — CARVEDILOL 6.25 MG: 6.25 TABLET, FILM COATED ORAL at 16:57

## 2025-01-26 RX ADMIN — Medication 6 MG: at 20:10

## 2025-01-26 RX ADMIN — MICONAZOLE NITRATE: 20 POWDER TOPICAL at 07:10

## 2025-01-26 RX ADMIN — DONEPEZIL HYDROCHLORIDE 10 MG: 10 TABLET, FILM COATED ORAL at 20:10

## 2025-01-26 RX ADMIN — ENOXAPARIN SODIUM 40 MG: 100 INJECTION SUBCUTANEOUS at 07:10

## 2025-01-26 RX ADMIN — ATORVASTATIN CALCIUM 80 MG: 80 TABLET, FILM COATED ORAL at 20:10

## 2025-01-26 RX ADMIN — LEVOTHYROXINE SODIUM 125 MCG: 0.12 TABLET ORAL at 06:12

## 2025-01-26 RX ADMIN — AMLODIPINE BESYLATE 10 MG: 10 TABLET ORAL at 07:09

## 2025-01-26 RX ADMIN — ONDANSETRON 4 MG: 4 TABLET, ORALLY DISINTEGRATING ORAL at 11:32

## 2025-01-26 RX ADMIN — ASPIRIN 81 MG: 81 TABLET, COATED ORAL at 07:09

## 2025-01-26 ASSESSMENT — PAIN SCALES - GENERAL
PAINLEVEL_OUTOF10: 5
PAINLEVEL_OUTOF10: 0
PAINLEVEL_OUTOF10: 0

## 2025-01-26 ASSESSMENT — PAIN DESCRIPTION - PAIN TYPE: TYPE: ACUTE PAIN

## 2025-01-26 ASSESSMENT — PAIN DESCRIPTION - LOCATION: LOCATION: KNEE

## 2025-01-26 ASSESSMENT — PAIN DESCRIPTION - ORIENTATION: ORIENTATION: RIGHT;LEFT

## 2025-01-26 NOTE — PROGRESS NOTES
[facility] Internal Medicine  Miles Lundberg MD; Chad Gordon MD; Uriel Elliott MD; MD Nely Villa MD; Bonita Argueta MD    [facility] Internal Medicine   IN-PATIENT SERVICE   [facility]    Progress note            Date:   1/26/2025  Patient name:  Lanny Jaimes  Date of admission:  1/17/2025  7:18 PM  MRN:   [id number]  Account:  [id number]  YOB: 1955  PCP:    No primary care provider on file.  Room:   [address] [place] [address]  Code Status:    Full Code    Chief Complaint:     No chief complaint on file.  Intracranial hemorrhage    History Obtained From:     Patient medical record nursing staff    History of Present Illness:     Lanny Jaimes is a 69 y.o. Non- / non  female who presents with No chief complaint on file.   and is admitted to the hospital for the management of Encephalopathy.    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     Medications Prior to Admission:     Prior to Admission medications    Medication Sig Start Date End Date Taking? Authorizing Provider   levothyroxine (SYNTHROID) 88 MCG tablet Take 1 tablet by mouth Daily     Norvasc 10 mg daily carvedilol 6.25 twice daily and lisinopril 40 mg  Goal systolic hypertension less than 140  DVT prophylaxis Lovenox 40 mg  Hypothyroidism Synthroid 100 mcg daily  Hyperlipidemia Lipitor 80 mg day  Aspirin 81 mg daily  Blood pressure log noted close monitoring    s.  1/26  Patient seen and examined  Labs reviewed, hypothyroidism, TSH is 21 T4 is low  increased the dose of levothyroxine recommended to repeat TSH in 4 to 6 weeks next  Vitals have been stable, no bradycardia    Consultations:   IP CONSULT TO DIETITIAN  IP CONSULT TO SOCIAL WORK  IP CONSULT TO INTERNAL MEDICINE  IP CONSULT TO PSYCHIATRY      Juliet Murillo MD  1/26/2025  3:57 PM      Please note that this chart was generated using voice recognition Dragon dictation software.  Although every effort was made to ensure the accuracy of this automated transcription, some errors in transcription may have occurred.

## 2025-01-26 NOTE — PROGRESS NOTES
[facility]   Acute Rehabilitation Occupational Therapy Daily Treatment Note    Date: 25  Patient Name: Lanny Jaimes       Room: 2619/2619-01  MRN: [id number]  Account: [id number]   : 1955  (69 y.o.) Gender: female       Referring Practitioner: Dr. Daniela Garcia  Diagnosis: Encephalopathy  Additional Pertinent Hx: Per chart: Ms. Lanny Jaimes is a 69 y.o. right handed female who was admitted to [facility] on 2025 with Fall (Frequent falls ) and Altered Mental Status.  Patient admitted from [facility] after fall from standing height. Small hemorrhagic parietal lobe contusion found and he was transferred to [place] for trauma and neurosurgery care.Neuro ICU: MRI showing microhemorrhage consistent with possible cerebral amyloid angiopathy. Small L parietal IPH with possible hemorrhagic contusion vs CAA.      Neurosurgery: confirmed multiple foci of microhemorrhage and diffusion restriction with small enhancing lesion R parietal lobe. No indication for surgical intervention. Recommending repeat MRI in a few months and signed off.Neuro endovascular: recommending strict hypertension management with SBP < 140mm Hg for hypertensive angiopathy. No current indication for antiplatelets. Having altered mentation/encephalopathy. DSA performed 25 with finding of L cavernous saccular aneurysm and plan for procedure in the next month or two.    Treatment Diagnosis: Impaired self-care status    Past Medical History:  has a past medical history of Thyroid disease.    Past Surgical History:   has no past surgical history on file.    Restrictions  Restrictions/Precautions  Restrictions/Precautions: Fall Risk, Bed Alarm, General Precautions  Activity Level: Up as Tolerated, Up with Assist  Required Braces or Orthoses?: No     Position Activity Restriction  Other Position/Activity Restrictions: SBP <140, activity as tolerated          Vitals  Vital Signs  O2 Device: None (Room air)

## 2025-01-26 NOTE — PROGRESS NOTES
Physical Medicine & Rehabilitation  Progress Note    1/26/2025 10:42 AM     CC: Ambulatory and ADL dysfunction due to encephalopathy with impaired cognition    Subjective:   No complaints from patient or staff.  Good spirits.  Has some loose stool this morning-patient attributes to some food she had.  Otherwise feels well.  No chest pain shortness of breath nausea vomiting abdominal pain discomfort nausea or vomiting    ROS:  Denies fevers, chills, sweats.  No chest pain, palpitations, lightheadedness.  Denies coughing, wheezing or shortness of breath.  Denies abdominal pain, nausea, diarrhea or constipation.  No new areas of joint pain.  Denies new areas of numbness or weakness.  Denies new anxiety or depression issues.  No new skin problems.    Rehabilitation:   PT:    Bed mobility  Bridging: Supervision  Rolling to Left: Supervision  Rolling to Right: Supervision  Supine to Sit: Contact guard assistance (Additional time needed to complete task c bed rail, education for hand placement)  Sit to Supine: Supervision (no rail)  Scooting: Stand by assistance  Bed Mobility Comments: Mat, wedge, 2 pillows. Entering/exiting to Pt's L.    Transfers  Sit to Stand: Stand by assistance (Patient always attempts to pull from walker, has very poor carryover c education to correct for safety.)  Stand to Sit: Contact guard assistance (Education to reach back, decreased eccentric control.)  Bed to Chair: Contact guard assistance (c walker)  Stand Pivot Transfers: Contact guard assistance (c walker)  Comment: Toilet transfer Sit<>stand MIN A, hygiene dependent, patient confused on sequence of cleaning herself,donning/doffing pants/brief MAX A, patient having difficulting reaching c sequencing issues, slow processing of task c poor awareness she needed to use restroom.    Ambulation  Surface: Level tile  Device: Rolling Walker  Assistance: Contact guard assistance  Quality of Gait: Unsteady, lacks bilateral TKE, flexed posture &

## 2025-01-26 NOTE — PLAN OF CARE
Problem: Discharge Planning  Goal: Discharge to home or other facility with appropriate resources  1/26/2025 0412 by Umesh Joshi, RN  Outcome: Progressing     Problem: Skin/Tissue Integrity  Goal: Absence of new skin breakdown  Description: 1.  Monitor for areas of redness and/or skin breakdown  2.  Assess vascular access sites hourly  3.  Every 4-6 hours minimum:  Change oxygen saturation probe site  4.  Every 4-6 hours:  If on nasal continuous positive airway pressure, respiratory therapy assess nares and determine need for appliance change or resting period.  1/26/2025 0412 by Umesh Joshi, RN  Outcome: Progressing     Problem: Safety - Adult  Goal: Free from fall injury  1/26/2025 0412 by Umesh Joshi, RN  Outcome: Progressing     Problem: ABCDS Injury Assessment  Goal: Absence of physical injury  1/26/2025 0412 by Umesh Joshi, RN  Outcome: Progressing     Problem: Pain  Goal: Verbalizes/displays adequate comfort level or baseline comfort level  1/26/2025 0412 by Umesh Joshi, RN  Outcome: Progressing

## 2025-01-26 NOTE — PROGRESS NOTES
assistance  Quality of Gait: Unsteady, lacks bilateral TKE, flexed posture & downward gaze, NBOS . Decreased step length RLE  Gait Deviations: Slow Asia;Decreased step length;Decreased step height;Shuffles;Decreased head and trunk rotation (Genu Varus)  Distance: 150 ft c 2 turns, 20 ft x 2, pm 10 ft x 2 rolling walker  Comments: tactile education needed at times, easily distracted by people around her.    PT Exercises  Resistive Exercises: 20 reps lime green T-band LAQ (B) LE  Pressure Relief Exercises: 10 reps of STS c walker in front of her working on processing sequence, 10 reps of STS w/o walker in front of her working on sequence c activity.  Static Sitting Balance Exercises: Pt sat EOB unsupported x  15 minutes in pm c/o of  not feeling well.  Dynamic Sitting Balance Exercises: (B) seated ankle pumps, heel raises SBA , marching MIN to increase rom.    Activity Tolerance: Treatment limited secondary to decreased cognition  Comments: increased confusion noted in pm    Therapy Prognosis: Good  Decision Making: Medium Complexity  History: Encephalopathy  Exam: decreased balance, endurance, cognition, mobility  Barriers to Learning: cognition    Discharge Recommendations: Continue to assess pending progress;24 hour supervision or assist         GOALS  Short Term Goals  Time Frame for Short Term Goals: 5-7 days  Short Term Goal 1: pt able to perform bed mobility fom flat surface with SBA  Short Term Goal 2: pt able to transfer from various surfaces with SBA  Short Term Goal 3: pt able to ambulate with RW and CGA/SBA x 100ft  Short Term Goal 4: Improve standing dynamic balance to Fair for increased safety with ADLs/transfers  Short Term Goal 5: Pt will perform supine<>sit transfer with supervision.  Long Term Goals  Time Frame for Long Term Goals : at time of discharge  Long Term Goal 1: pt able to perform mod-I bd mobility  Long Term Goal 2: pt abl to transfer from various surfaces with supervision  Long Term  Goal 3: pt able to ambulate on various surfaces with least restrictive device x 150ft with supervision  Long Term Goal 4: pt able to negotiate curb sized step with device and supervision for community mobility  Long Term Goal 5: pt able to demonstrate Fair+ Dynamic standing balance for improved safety of ADLs and mobility    PLAN OF CARE  Frequency: 1-2 treatment sessions per day, 5-7 days per week  Physical Therapy Plan  General Plan:  minutes of therapy at least 5 out of 7 days a week  Current Treatment Recommendations: Strengthening;Functional mobility training;Gait training;Stair training;Transfer training;Endurance training;Safety education & training;Patient/Caregiver education & training;Therapeutic activities;Home exercise program  Safety Devices  Type of Devices: Gait belt;All fall risk precautions in place;Patient at risk for falls;Left in chair;Chair alarm in place;Call light within reach    EDUCATION  Education Given To: Patient  Education Provided: Role of Therapy;Home Exercise Program;Safety;Mobility Training;Transfer Training;Fall Prevention Strategies  Education Provided Comments: Increased time to process tasks c poor carryover.  Education Method: Verbal;Demonstration  Barriers to Learning: Cognition  Education Outcome: Continued education needed    ELOS:          Therapy Time     01/26/25 0730 01/26/25 0731   PT Individual Minutes   Time In 0757 1255   Time Out 0900 1330   Minutes 63 35               Corrina Chavez PTA, 01/26/25 at 2:20 PM

## 2025-01-26 NOTE — PROGRESS NOTES
Speech Language Pathology  [facility] ACUTE REHAB    Cognitive Treatment Note    Date: 1/26/2025  Patient’s Name: Lanny Jaimes  MRN: [id number]  Diagnosis:   Patient Active Problem List   Diagnosis Code    Focal hemorrhagic contusion of cerebrum S06.33AA    Acute intracranial hemorrhage (HCC) I62.9    Cerebral amyloid angiopathy (HCC) E85.4, I68.0    Acquired hypothyroidism E03.9    Primary hypertension I10    Fall W19.XXXA    Aneurysm of cavernous portion of left internal carotid artery I67.1    Hypertensive urgency I16.0    Encephalopathy G93.40    Major neurocognitive disorder (HCC) F03.90       Pain Rating: Reporting nausea, stating \"I feel like I'm going to throw up\". RN, Sonia, notified.     Cognitive Treatment    Treatment time:       SLP Individual Minutes  Time In: 1109  Time Out: 1130  Minutes: 21       Subjective: [x] Alert [x] Cooperative     [] Confused     [] Agitated    [] Lethargic      Objective/Assessment:    Attention: ST shutting door upon arrival and muting television to reduce external distractions. Pt. Demo intermittent internal distraction t/o session and required occasional redirection to tasks.     Orientation: Oriented to person, disoriented to place. Unable to provide cues for accurate orientation to place as pt. Stating \"I just don't feel good\". Further orientation prompts discontinued.     LTM: Pt. Has photo album on bedside table. ST and pt. Engaged in photo album with able to provide details re: who the person in the photo is independently. Pt. Able to provide some additional details (what they were doing, etc.) with further prompts/questioning.    Speech: Pt. Continues with mild dysarthria characterized by reduced rate, impreciseness, and blended word boundaries. Pt. Unable to state dysarthria strategies independently, able to state 2/3 with binary choices. Pt. Instructed to repeat 5-6 word phrases using dysarthria strategies with mod A from ST. Suspect poor carryover of strategies d/t recall

## 2025-01-27 PROCEDURE — 97110 THERAPEUTIC EXERCISES: CPT

## 2025-01-27 PROCEDURE — 6370000000 HC RX 637 (ALT 250 FOR IP)

## 2025-01-27 PROCEDURE — 97116 GAIT TRAINING THERAPY: CPT

## 2025-01-27 PROCEDURE — 6370000000 HC RX 637 (ALT 250 FOR IP): Performed by: INTERNAL MEDICINE

## 2025-01-27 PROCEDURE — 6370000000 HC RX 637 (ALT 250 FOR IP): Performed by: PHYSICAL MEDICINE & REHABILITATION

## 2025-01-27 PROCEDURE — 6370000000 HC RX 637 (ALT 250 FOR IP): Performed by: GENERAL PRACTICE

## 2025-01-27 PROCEDURE — 1180000000 HC REHAB R&B

## 2025-01-27 PROCEDURE — 97130 THER IVNTJ EA ADDL 15 MIN: CPT

## 2025-01-27 PROCEDURE — 97129 THER IVNTJ 1ST 15 MIN: CPT

## 2025-01-27 PROCEDURE — 99232 SBSQ HOSP IP/OBS MODERATE 35: CPT | Performed by: PHYSICAL MEDICINE & REHABILITATION

## 2025-01-27 PROCEDURE — 97530 THERAPEUTIC ACTIVITIES: CPT

## 2025-01-27 PROCEDURE — 99232 SBSQ HOSP IP/OBS MODERATE 35: CPT | Performed by: INTERNAL MEDICINE

## 2025-01-27 PROCEDURE — 6360000002 HC RX W HCPCS

## 2025-01-27 PROCEDURE — 92507 TX SP LANG VOICE COMM INDIV: CPT

## 2025-01-27 PROCEDURE — 97535 SELF CARE MNGMENT TRAINING: CPT

## 2025-01-27 RX ADMIN — ENOXAPARIN SODIUM 40 MG: 100 INJECTION SUBCUTANEOUS at 07:07

## 2025-01-27 RX ADMIN — MICONAZOLE NITRATE: 20 POWDER TOPICAL at 07:09

## 2025-01-27 RX ADMIN — CARVEDILOL 6.25 MG: 6.25 TABLET, FILM COATED ORAL at 07:07

## 2025-01-27 RX ADMIN — AMLODIPINE BESYLATE 10 MG: 10 TABLET ORAL at 07:08

## 2025-01-27 RX ADMIN — LISINOPRIL 40 MG: 20 TABLET ORAL at 07:08

## 2025-01-27 RX ADMIN — CARVEDILOL 6.25 MG: 6.25 TABLET, FILM COATED ORAL at 17:05

## 2025-01-27 RX ADMIN — ATORVASTATIN CALCIUM 80 MG: 80 TABLET, FILM COATED ORAL at 22:38

## 2025-01-27 RX ADMIN — DONEPEZIL HYDROCHLORIDE 10 MG: 10 TABLET, FILM COATED ORAL at 22:38

## 2025-01-27 RX ADMIN — ASPIRIN 81 MG: 81 TABLET, COATED ORAL at 07:07

## 2025-01-27 RX ADMIN — Medication 6 MG: at 22:38

## 2025-01-27 RX ADMIN — LEVOTHYROXINE SODIUM 125 MCG: 0.12 TABLET ORAL at 05:33

## 2025-01-27 RX ADMIN — ACETAMINOPHEN 650 MG: 325 TABLET ORAL at 22:38

## 2025-01-27 ASSESSMENT — PAIN DESCRIPTION - ORIENTATION
ORIENTATION: RIGHT;LEFT
ORIENTATION: RIGHT;LEFT

## 2025-01-27 ASSESSMENT — PAIN DESCRIPTION - LOCATION
LOCATION: KNEE
LOCATION: KNEE

## 2025-01-27 ASSESSMENT — PAIN DESCRIPTION - DESCRIPTORS
DESCRIPTORS: ACHING;SORE
DESCRIPTORS: ACHING

## 2025-01-27 ASSESSMENT — PAIN - FUNCTIONAL ASSESSMENT
PAIN_FUNCTIONAL_ASSESSMENT: ACTIVITIES ARE NOT PREVENTED
PAIN_FUNCTIONAL_ASSESSMENT: PREVENTS OR INTERFERES SOME ACTIVE ACTIVITIES AND ADLS

## 2025-01-27 ASSESSMENT — PAIN SCALES - GENERAL
PAINLEVEL_OUTOF10: 0
PAINLEVEL_OUTOF10: 9
PAINLEVEL_OUTOF10: 10

## 2025-01-27 ASSESSMENT — PAIN DESCRIPTION - PAIN TYPE: TYPE: CHRONIC PAIN

## 2025-01-27 NOTE — PROGRESS NOTES
ARU CASE MANAGEMENT NOTE:    Admission Date:  1/17/2025 Lanny Jaimes is a 69 y.o.  female    Admitted for : Encephalopathy [G93.40]    Discharge plan is to discharge to SNF. [facility] [place] [facility] and authorization was initiated on Friday 1/24/25 Lanny from [facility] left a VM message that prior auth was approved for patient to admit patient on 1/28/24    Called and spoke with Percy who works with [employer] and has agreed to accept patient tomorrow between 3-4pm  Spoke with Denice from [employer], Patient will be picked up at 4pm    Called son Sha and updated him on the above.    Outside appointments while in ARU: none    DME:none    Will continue to follow for additional discharge needs.    Electronically signed by Maria F Freyer, RN on 1/27/2025 at 9:25 AM

## 2025-01-27 NOTE — PLAN OF CARE
Problem: Safety - Adult  Goal: Free from fall injury  1/27/2025 1514 by Lavinia Suresh, RN  Outcome: Progressing  1/27/2025 0348 by Umesh Joshi RN  Outcome: Progressing     Problem: Pain  Goal: Verbalizes/displays adequate comfort level or baseline comfort level  1/27/2025 1514 by Lavinia Suresh, RN  Outcome: Progressing  1/27/2025 0348 by Umesh Joshi, RN  Outcome: Progressing

## 2025-01-27 NOTE — PROGRESS NOTES
Physical Medicine & Rehabilitation  Progress Note    1/27/2025 10:59 AM     CC: Ambulatory and ADL dysfunction due to encephalopathy with impaired cognition    Subjective:   No complaints from patient or staff.  Good spirits.  Bowels better.  Seen in gym    ROS:  Denies fevers, chills, sweats.  No chest pain, palpitations, lightheadedness.  Denies coughing, wheezing or shortness of breath.  Denies abdominal pain, nausea, diarrhea or constipation.  No new areas of joint pain.  Denies new areas of numbness or weakness.  Denies new anxiety or depression issues.  No new skin problems.    Rehabilitation:   PT:    Bed mobility  Bridging: Supervision  Rolling to Left: Stand by assistance  Rolling to Right: Supervision  Supine to Sit: Contact guard assistance (Additional time needed to complete task c bed rail, education for hand placement)  Sit to Supine: Supervision  Scooting: Stand by assistance  Bed Mobility Comments: Mat, wedge, 2 pillows. Entering/exiting to Pt's L.    Transfers  Sit to Stand: Stand by assistance (Patient always attempts to pull from walker, has very poor carryover c education to correct for safety.)  Stand to Sit: Contact guard assistance (Education to reach back, decreased eccentric control.)  Bed to Chair: Contact guard assistance (c walker)  Stand Pivot Transfers: Contact guard assistance (c walker bed<> recliner)  Comment: Toilet transfer Sit<>stand MIN A, hygiene dependent, patient confused on sequence of cleaning herself,donning/doffing pants/brief MAX A, patient having difficulting reaching c sequencing issues, slow processing of task c poor awareness she needed to use restroom.    Ambulation  Surface: Level tile  Device: Rolling Walker  Assistance: Contact guard assistance  Quality of Gait: Unsteady, lacks bilateral TKE, flexed posture & downward gaze, NBOS . Decreased step length RLE  Gait Deviations: Slow Asia, Decreased step length, Decreased step height, Shuffles, Decreased head and  services    [] Discharge from :      Objective:  /71   Pulse 62   Temp 97.7 °F (36.5 °C) (Oral)   Resp 16   Ht 1.702 m (5' 7\")   Wt 67.6 kg (149 lb)   SpO2 93%   BMI 23.34 kg/m²  I Body mass index is 23.34 kg/m². I   Wt Readings from Last 1 Encounters:   25 67.6 kg (149 lb)      Temp (24hrs), Av.6 °F (36.4 °C), Min:97.5 °F (36.4 °C), Max:97.7 °F (36.5 °C)         GEN: well developed, well nourished, no acute distress  HEENT: Normocephalic atraumatic, EOMI, mucous membranes pink and moist  CV: RRR, no murmurs, rubs or gallops  PULM: CTAB, no rales or rhonchi. Respirations WNL and unlabored  ABD: soft, NT no guarding or rebound all 4 quadrants  Neuro today alert knew she was in the hospital, knew president not sure of year, follow command. Sensation intact to light touch.   MSK: 4+/5 upper and lower extremity  EXTREMITIES: No calf tenderness to palpation bilaterally. No edema BLEs  SKIN: warm dry and intact with good turgor  PSYCH: appropriately interactive. Affect WNL.         Medications   Scheduled Meds:   levothyroxine  125 mcg Oral Daily    donepezil  10 mg Oral Nightly    melatonin  6 mg Oral Nightly    miconazole   Topical BID    polyethylene glycol  17 g Oral Daily    enoxaparin  40 mg SubCUTAneous Daily    amLODIPine  10 mg Oral Daily    atorvastatin  80 mg Oral Nightly    aspirin  81 mg Oral Daily    lisinopril  40 mg Oral Daily    carvedilol  6.25 mg Oral BID WC     Continuous Infusions:  PRN Meds:.acetaminophen, senna, bisacodyl, bacitracin, ibuprofen, hydrocortisone, ondansetron **OR** [DISCONTINUED] ondansetron     Diagnostics:     CBC:   Recent Labs     25  0612   WBC 8.7   RBC 3.59*   HGB 11.2*   HCT 33.9*   MCV 94.3   RDW 16.4*        BMP:   Recent Labs     25  0612      K 4.0      CO2 26   BUN 27*   CREATININE 0.7     BNP: No results for input(s): \"BNP\" in the last 72 hours.  PT/INR: No results for input(s): \"PROTIME\", \"INR\" in the last 72

## 2025-01-27 NOTE — PROGRESS NOTES
[facility]   Acute Rehabilitation Occupational Therapy Daily Treatment Note    Date: 25  Patient Name: Lanny Jaimes       Room: 2619/2619-01  MRN: [id number]  Account: [id number]   : 1955  (69 y.o.) Gender: female       Referring Practitioner: Dr. Daneila Garcia  Diagnosis: Encephalopathy  Additional Pertinent Hx: Per chart: Ms. Lanny Jaimes is a 69 y.o. right handed female who was admitted to [facility] on 2025 with Fall (Frequent falls ) and Altered Mental Status.  Patient admitted from [facility] after fall from standing height. Small hemorrhagic parietal lobe contusion found and he was transferred to [place] for trauma and neurosurgery care.Neuro ICU: MRI showing microhemorrhage consistent with possible cerebral amyloid angiopathy. Small L parietal IPH with possible hemorrhagic contusion vs CAA.      Neurosurgery: confirmed multiple foci of microhemorrhage and diffusion restriction with small enhancing lesion R parietal lobe. No indication for surgical intervention. Recommending repeat MRI in a few months and signed off.Neuro endovascular: recommending strict hypertension management with SBP < 140mm Hg for hypertensive angiopathy. No current indication for antiplatelets. Having altered mentation/encephalopathy. DSA performed 25 with finding of L cavernous saccular aneurysm and plan for procedure in the next month or two.    Treatment Diagnosis: Impaired self-care status    Past Medical History:  has a past medical history of Thyroid disease.    Past Surgical History:   has no past surgical history on file.    Restrictions  Restrictions/Precautions  Restrictions/Precautions: Fall Risk, Bed Alarm, General Precautions  Activity Level: Up as Tolerated, Up with Assist  Required Braces or Orthoses?: No     Position Activity Restriction  Other Position/Activity Restrictions: SBP <140, activity as tolerated          Vitals

## 2025-01-27 NOTE — PATIENT CARE CONFERENCE
[facility] Acute Inpatient Rehabilitation  TEAM CONFERENCE NOTE  Date: 25  Patient Name: Lanny Jaimes       Room: 2619/2619-01  MRN: [id number]       : 1955  (69 y.o.)     Gender: female           PRINCIPAL DIAGNOSIS: Encephalopathy    NURSING--------------------------------------------------------------------------------    Bladder Continence:   Incontinent at night  - urinates before she can ask for help  Patient able to sense urge to void urine: Yes  Patient able to control urge to void urine: No    Bowel Continence: Always Continent  Patient able to sense urge to void stool: Yes  Patient able to control urge to void stool: Yes    Date of Last BM: 25    Bladder/Bowel Program Interventions: Both Bowel & Bladder Program In Place     Wounds/Incisions/Ulcers: Wounds present on Bilateral knees, left foot     Pain Control: Patient's pain currently controlled and pain regimen effective as ordered    Pain Medication Regimen Usage Pattern: MAR reviewed and pain medications are being used at the following frequency (Specify Medication, # Doses Administered on average per day, identified patterns of use - for example: time of day, prior to activity/therapy)  Tylenol 650 mg q4 PRN, takes less than once daily  Ibuprofen 800 mg q6 PRN, has only been administered one day    Fall Risk:  Falling star program initiated    Medication Education Program: Patient currently unable to manage medications and responsible party being educated    Discharge Preparation Patient/Responsible Party Education In Progress:   [Complete for All Patients] Rehab Specific Admitting Diagnosis Education: Safety, Stroke Education, and Fall Prevention    Nursing specific communication for TEAM: No additional information identified requiring communication at this time    PHYSICAL THERAPY------------------------------------------------------------------    Bed mobility  Rolling to Left: Supervision  Rolling to Right:

## 2025-01-27 NOTE — PROGRESS NOTES
Speech Language Pathology  [facility] ACUTE REHAB    Cognitive Treatment Note    Date: 2025  Patient’s Name: Lanny Jaimes  MRN: [id number]  Diagnosis:   Patient Active Problem List   Diagnosis Code    Focal hemorrhagic contusion of cerebrum S06.33AA    Acute intracranial hemorrhage (HCC) I62.9    Cerebral amyloid angiopathy (HCC) E85.4, I68.0    Acquired hypothyroidism E03.9    Primary hypertension I10    Fall W19.XXXA    Aneurysm of cavernous portion of left internal carotid artery I67.1    Hypertensive urgency I16.0    Encephalopathy G93.40    Major neurocognitive disorder (HCC) F03.90       Pain Rating: None reported    Cognitive Treatment    Treatment time:       SLP Individual Minutes  Time In: 1103  Time Out: 1133  Minutes: 30     SLP Individual Minutes  Time In: 1339  Time Out: 1406  Minutes: 27     Subjective: [x] Alert [x] Cooperative     [] Confused     [] Agitated    [] Lethargic      Objective/Assessment:    Attention: ST shutting door upon arrival and muting television to reduce external distractions. Pt. Demo intermittent internal distraction t/o session and required occasional redirection to tasks.     Pt. More distracted in PM session, perseverating on pain goal.     Orientation: Pt. Oriented to name, month, and year independently. Oriented to  and place with mod verbal cues. Oriented to age with binary choices. When questioned re: situation, pt. Stated \"my knees\". Able to state she had a fall with additional prompts.     Recall: Largest number (3 units): 7/10 increased to 10/10 with min-mod verbal cues/repetition of stimuli.   Attribute inclusion (3 units): 5/10 increased to 10/10 with mod-max verbal/visual cues and several repetitions of stimuli.     Problem Solving/Reasoning: Problem solving (safety scenarios): 5/10 increased to 10/10 with mod-max verbal cues.   Divergent naming (colors): +3 independently increased to +7 with mod verbal cues.     Speech: Pt. Continues with mild dysarthria

## 2025-01-27 NOTE — DISCHARGE SUMMARY
Physical Medicine & Rehabilitation  Discharge Summary     Patient Identification:  Lanny Jaimes  : 1955  Admit date: 2025  Discharge date: 25   Attending provider: Daniela Garcia MD      Discharging provider: Brien Valle MD    Primary care provider: Juan Antonio Barreto MD     Discharge Diagnoses:   Metabolic encephalopathy  Hypertensive angiopathy with microhemorrhages  Fall with intraparenchymal hemorrhage  Dementia  Hypertension  Hyperlipidemia  Hypothyroidism  Left cavernous ICA aneurysm status post DSA  Insomnia  Cutaneous candidiasis    Discharge Functional Status:    Physical Therapy:  Bed Mobility:   Bed mobility  Bridging: Supervision  Rolling to Left: Modified independent  Rolling to Right: Modified independent  Supine to Sit: Supervision  Sit to Supine: Supervision  Scooting: Supervision  Bed Mobility Comments: Flat bed, pt used 1 bedrail         Transfers:   Transfers  Sit to Stand: Stand by assistance  Stand to Sit: Contact guard assistance (lacking eccentric control, pt will \"drop or plop\" into seat.)  Bed to Chair: Contact guard assistance  Stand Pivot Transfers: Contact guard assistance  Comment: Cues for safe hand positioning. Pt attempts to pull self up from RW into a standing position.         Mobility:   Ambulation  Surface: Level tile  Device: Rolling Walker  Assistance: Contact guard assistance  Quality of Gait: scissoring gait at times, slow pace with very narrow SARAH, flexed posture with rounded shoulders, head and gaze downward, flexed hips, flexed knees, short stride  Gait Deviations: Slow Asia, Decreased step length, Decreased step height, Decreased arm swing, Deviated path, Shuffles  Distance: 175' AM  Comments: cues to widen SARAH, tactile cues to pull shoulders back and hips into extension for upright posture, pt quickly relaxes posture back into flexed postion with little retention.  More Ambulation?: Yes  Ambulation 2  Surface - 2: level tile, ramp  Device 2: Rolling

## 2025-01-27 NOTE — PROGRESS NOTES
[facility] Internal Medicine  Miles Lundberg MD; Chad Gordon MD; Uriel Elliott MD; MD Nely Villa MD; Bonita Argueta MD    [facility] Internal Medicine   IN-PATIENT SERVICE   [facility]    Progress note            Date:   1/27/2025  Patient name:  Lanny Jaimes  Date of admission:  1/17/2025  7:18 PM  MRN:   [id number]  Account:  [id number]  YOB: 1955  PCP:    No primary care provider on file.  Room:   [address] [place] [address]  Code Status:    Full Code    Chief Complaint:     No chief complaint on file.  Intracranial hemorrhage    History Obtained From:     Patient medical record nursing staff    History of Present Illness:     Lanny Jaimes is a 69 y.o. Non- / non  female who presents with No chief complaint on file.   and is admitted to the hospital for the management of Encephalopathy.    69-year-old  lady was transferred from [facility] to [facility] with altered mental status found to have small left parietal hemorrhagic contusion after a fall at home  MRI brain was done which shows consistent with hypertensive microangiopathy on presentation patient's systolic blood pressure was over 220  Repeat CTA head and neck showed left ICA cavernous aneurysm neurointervention was consulted has angiogram done which shows right posterior communicating infundibulum and left cavernous medially saccular aneurysm plan for outpatient intervention for left cavernous ICA aneurysm  For deconditioning patient was transferred to acute rehab for further therapy    Past Medical History:     Past Medical History:   Diagnosis Date    Thyroid disease         Past Surgical History:     No past surgical history on file.     Medications Prior to Admission:     Prior to Admission medications    Medication Sig Start Date End Date Taking? Authorizing Provider   levothyroxine (SYNTHROID) 88 MCG tablet Take 1 tablet by mouth Daily

## 2025-01-27 NOTE — PROGRESS NOTES
Comprehensive Nutrition Assessment    Type and Reason for Visit:  Reassess    Nutrition Recommendations/Plan:   Continue current diet.   Monitor wtl.      Malnutrition Assessment:  Malnutrition Status:  Insufficient data (None suspected) (01/19/25 7981)    Context:  Acute Illness     Findings of the 6 clinical characteristics of malnutrition:  Energy Intake:  Mild decrease in energy intake (Possibly over course of hospitalization)  Weight Loss:  Unable to assess     Body Fat Loss:  Unable to assess     Muscle Mass Loss:  Unable to assess    Fluid Accumulation:  No fluid accumulation     Strength:  Not Performed    Nutrition Assessment:    Pt states she is eating okay. Observed lunch tray. Pt frequently repeating herself.    Nutrition Related Findings:    No edema. Synthroid. Labs and other meds reviewed. Hx: uncontrolled hypertension, hyperthyroidism treated with radiation resulting in iatrogenic hypothyroidism. Wound Type: Multiple (traumatic wounds)       Current Nutrition Intake & Therapies:    Average Meal Intake: 51-75%, %     ADULT DIET; Regular    Anthropometric Measures:  Height: 170.2 cm (5' 7\")  Ideal Body Weight (IBW): 135 lbs (61 kg)       Current Body Weight: 67.6 kg (149 lb 0.5 oz) (1/9 method unspecified), 110.4 % IBW. Weight Source: Not specified  Current BMI (kg/m2): 23.3  Usual Body Weight: 68 kg (150 lb) (per pt)     % Weight Change (Calculated): -0.6                    BMI Categories: Normal Weight (BMI 18.5-24.9)    Estimated Daily Nutrient Needs:  Energy Requirements Based On: Formula  Weight Used for Energy Requirements:  (listed wt 1/9)  Energy (kcal/day): 5779-1082 based on Lincoln-St. Jeor with 1.2-1.3 factor  Weight Used for Protein Requirements:  (wt 1/9)  Protein (g/day): 68-74 based on 1 to 1.1 gm per kg  Method Used for Fluid Requirements: 1 ml/kcal  Fluid (ml/day): or per physician    Nutrition Diagnosis:   No nutrition diagnosis at this time related to   as evidenced by

## 2025-01-27 NOTE — PROGRESS NOTES
Physical Therapy  Facility/Department: [facility] ACUTE REHAB    NAME: Lanny Jaimes  : 1955 (69 y.o.)  MRN: [id number]  CODE STATUS: Full Code    Date of Service: 25      Past Medical History:   Diagnosis Date    Thyroid disease      No past surgical history on file.    Other (Comment): Delayed processing, increased time and repetition required  General  General Comments: decreased coginition in pm, having repetitive conversation c with patient, unable to redirect patient patient kept repeating she wasn't feeling well but could not clarify what was wrong.    Restrictions:  Restrictions/Precautions: Fall Risk;Bed Alarm;General Precautions     SUBJECTIVE  Subjective: Patient agreeable to both AM and PM sessions this date.  She continues to demonstrate confusion when asked orientation questions.       OBJECTIVE  Vision  Vision: Impaired  Vision Exceptions: Wears glasses at all times    Hearing  Hearing Exceptions: Hard of hearing/hearing concerns       Functional Mobility  Bed mobility  Rolling to Left: Supervision  Rolling to Right: Supervision  Supine to Sit: Contact guard assistance (Additional time needed to complete task c bed rail, education for hand placement.  Occasional CGA for balance during task.)  Sit to Supine: Supervision  Scooting: Supervision  Transfers  Sit to Stand: Stand by assistance (Patient always attempts to pull from walker, has very poor carryover c education to correct for safety.)  Stand to Sit: Contact guard assistance (Education to reach back, decreased eccentric control.)  Bed to Chair: Contact guard assistance (c walker)  Stand Pivot Transfers: Contact guard assistance (c walker bed<> recliner)    Environmental Mobility  Ambulation  Surface: Level tile  Device: Rolling Walker  Assistance: Contact guard assistance (CGA with turns;  SBA at times during straight forward amb.)  Quality of Gait: Unsteady, lacks bilateral TKE, flexed posture & downward gaze, NBOS . Decreased step length

## 2025-01-27 NOTE — DISCHARGE INSTR - COC
Continuity of Care Form    Patient Name: Lanny Jaimes   :  1955  MRN:  [id number]    Admit date:  2025  Discharge date:  2025    Code Status Order: Full Code   Advance Directives:   Advance Care Flowsheet Documentation             Admitting Physician:  Daniela Garcia MD  PCP: No primary care provider on file.    Discharging Nurse: brad lara  Discharging Hospital Unit/Room#: 2619/2619-01  Discharging Unit Phone Number: [phone]    Emergency Contact:   Extended Emergency Contact Information  Primary Emergency Contact: kim Friedman  Mobile Phone: [phone]  Relation: Child  Preferred language: English   needed? No  Secondary Emergency Contact: Lavinia Blanc  Mobile Phone: [phone]  Relation: Friend  Preferred language: English   needed? No    Past Surgical History:  No past surgical history on file.    Immunization History:   Immunization History   Administered Date(s) Administered    COVID-19, J&J, (age 18y+), IM, 0.5 mL 03/10/2021    TDaP, ADACEL (age 10y-64y), BOOSTRIX (age 10y+), IM, 0.5mL 2025       Active Problems:  Patient Active Problem List   Diagnosis Code    Focal hemorrhagic contusion of cerebrum S06.33AA    Acute intracranial hemorrhage (HCC) I62.9    Cerebral amyloid angiopathy (HCC) E85.4, I68.0    Acquired hypothyroidism E03.9    Primary hypertension I10    Fall W19.XXXA    Aneurysm of cavernous portion of left internal carotid artery I67.1    Hypertensive urgency I16.0    Encephalopathy G93.40    Major neurocognitive disorder (HCC) F03.90       Isolation/Infection:   Isolation            No Isolation          Patient Infection Status       None to display            Nurse Assessment:  Last Vital Signs: /71   Pulse 62   Temp 97.7 °F (36.5 °C) (Oral)   Resp 16   Ht 1.702 m (5' 7\")   Wt 67.6 kg (149 lb)   SpO2 93%   BMI 23.34 kg/m²     Last documented pain score (0-10 scale): Pain Level: 10  Last Weight:   Wt Readings from Last 1 Encounters:

## 2025-01-28 ENCOUNTER — APPOINTMENT (OUTPATIENT)
Dept: CT IMAGING | Age: 70
DRG: 949 | End: 2025-01-28
Attending: PHYSICAL MEDICINE & REHABILITATION
Payer: MEDICARE

## 2025-01-28 ENCOUNTER — APPOINTMENT (OUTPATIENT)
Dept: GENERAL RADIOLOGY | Age: 70
DRG: 949 | End: 2025-01-28
Attending: PHYSICAL MEDICINE & REHABILITATION
Payer: MEDICARE

## 2025-01-28 LAB
AMMONIA PLAS-SCNC: 17 UMOL/L (ref 11–51)
ANION GAP SERPL CALCULATED.3IONS-SCNC: 8 MMOL/L (ref 9–16)
B PARAP IS1001 DNA NPH QL NAA+NON-PROBE: NOT DETECTED
B PERT DNA SPEC QL NAA+PROBE: NOT DETECTED
BUN SERPL-MCNC: 24 MG/DL (ref 8–23)
C PNEUM DNA NPH QL NAA+NON-PROBE: NOT DETECTED
CALCIUM SERPL-MCNC: 9.1 MG/DL (ref 8.6–10.4)
CHLORIDE SERPL-SCNC: 105 MMOL/L (ref 98–107)
CO2 SERPL-SCNC: 30 MMOL/L (ref 20–31)
CREAT SERPL-MCNC: 0.8 MG/DL (ref 0.7–1.2)
ERYTHROCYTE [DISTWIDTH] IN BLOOD BY AUTOMATED COUNT: 15.8 % (ref 11.5–14.9)
FLUAV RNA NPH QL NAA+NON-PROBE: NOT DETECTED
FLUBV RNA NPH QL NAA+NON-PROBE: NOT DETECTED
GFR, ESTIMATED: 80 ML/MIN/1.73M2
GLUCOSE SERPL-MCNC: 114 MG/DL (ref 74–99)
HADV DNA NPH QL NAA+NON-PROBE: NOT DETECTED
HCOV 229E RNA NPH QL NAA+NON-PROBE: NOT DETECTED
HCOV HKU1 RNA NPH QL NAA+NON-PROBE: NOT DETECTED
HCOV NL63 RNA NPH QL NAA+NON-PROBE: NOT DETECTED
HCOV OC43 RNA NPH QL NAA+NON-PROBE: NOT DETECTED
HCT VFR BLD AUTO: 33.7 % (ref 36–46)
HGB BLD-MCNC: 11 G/DL (ref 12–16)
HMPV RNA NPH QL NAA+NON-PROBE: NOT DETECTED
HPIV1 RNA NPH QL NAA+NON-PROBE: NOT DETECTED
HPIV2 RNA NPH QL NAA+NON-PROBE: NOT DETECTED
HPIV3 RNA NPH QL NAA+NON-PROBE: NOT DETECTED
HPIV4 RNA NPH QL NAA+NON-PROBE: NOT DETECTED
M PNEUMO DNA NPH QL NAA+NON-PROBE: NOT DETECTED
MCH RBC QN AUTO: 30.5 PG (ref 26–34)
MCHC RBC AUTO-ENTMCNC: 32.7 G/DL (ref 31–37)
MCV RBC AUTO: 93.2 FL (ref 80–100)
PLATELET # BLD AUTO: 304 K/UL (ref 150–450)
PMV BLD AUTO: 7.6 FL (ref 6–12)
POTASSIUM SERPL-SCNC: 4 MMOL/L (ref 3.7–5.3)
RBC # BLD AUTO: 3.62 M/UL (ref 4–5.2)
RSV RNA NPH QL NAA+NON-PROBE: NOT DETECTED
RV+EV RNA NPH QL NAA+NON-PROBE: NOT DETECTED
SARS-COV-2 RNA NPH QL NAA+NON-PROBE: NOT DETECTED
SODIUM SERPL-SCNC: 143 MMOL/L (ref 136–145)
SPECIMEN DESCRIPTION: NORMAL
WBC OTHER # BLD: 9.3 K/UL (ref 3.5–11)

## 2025-01-28 PROCEDURE — 80048 BASIC METABOLIC PNL TOTAL CA: CPT

## 2025-01-28 PROCEDURE — 36415 COLL VENOUS BLD VENIPUNCTURE: CPT

## 2025-01-28 PROCEDURE — 6370000000 HC RX 637 (ALT 250 FOR IP): Performed by: GENERAL PRACTICE

## 2025-01-28 PROCEDURE — 6370000000 HC RX 637 (ALT 250 FOR IP): Performed by: PHYSICAL MEDICINE & REHABILITATION

## 2025-01-28 PROCEDURE — 1180000000 HC REHAB R&B

## 2025-01-28 PROCEDURE — 6360000002 HC RX W HCPCS

## 2025-01-28 PROCEDURE — 6370000000 HC RX 637 (ALT 250 FOR IP): Performed by: INTERNAL MEDICINE

## 2025-01-28 PROCEDURE — 99239 HOSP IP/OBS DSCHRG MGMT >30: CPT | Performed by: PHYSICAL MEDICINE & REHABILITATION

## 2025-01-28 PROCEDURE — 97530 THERAPEUTIC ACTIVITIES: CPT

## 2025-01-28 PROCEDURE — 81001 URINALYSIS AUTO W/SCOPE: CPT

## 2025-01-28 PROCEDURE — 74018 RADEX ABDOMEN 1 VIEW: CPT

## 2025-01-28 PROCEDURE — 0202U NFCT DS 22 TRGT SARS-COV-2: CPT

## 2025-01-28 PROCEDURE — 85027 COMPLETE CBC AUTOMATED: CPT

## 2025-01-28 PROCEDURE — 70450 CT HEAD/BRAIN W/O DYE: CPT

## 2025-01-28 PROCEDURE — 6370000000 HC RX 637 (ALT 250 FOR IP)

## 2025-01-28 PROCEDURE — 97129 THER IVNTJ 1ST 15 MIN: CPT

## 2025-01-28 PROCEDURE — 99232 SBSQ HOSP IP/OBS MODERATE 35: CPT | Performed by: INTERNAL MEDICINE

## 2025-01-28 PROCEDURE — 97116 GAIT TRAINING THERAPY: CPT

## 2025-01-28 PROCEDURE — 97110 THERAPEUTIC EXERCISES: CPT

## 2025-01-28 PROCEDURE — 82140 ASSAY OF AMMONIA: CPT

## 2025-01-28 RX ORDER — LISINOPRIL 40 MG/1
40 TABLET ORAL DAILY
DISCHARGE
Start: 2025-01-28

## 2025-01-28 RX ORDER — DONEPEZIL HYDROCHLORIDE 10 MG/1
10 TABLET, FILM COATED ORAL NIGHTLY
DISCHARGE
Start: 2025-01-28

## 2025-01-28 RX ORDER — BENZOCAINE/MENTHOL 6 MG-10 MG
LOZENGE MUCOUS MEMBRANE
DISCHARGE
Start: 2025-01-28 | End: 2025-02-03

## 2025-01-28 RX ORDER — ATORVASTATIN CALCIUM 80 MG/1
80 TABLET, FILM COATED ORAL NIGHTLY
DISCHARGE
Start: 2025-01-28

## 2025-01-28 RX ORDER — ONDANSETRON 4 MG/1
4 TABLET, ORALLY DISINTEGRATING ORAL EVERY 8 HOURS PRN
DISCHARGE
Start: 2025-01-28

## 2025-01-28 RX ORDER — GINSENG 100 MG
CAPSULE ORAL
DISCHARGE
Start: 2025-01-28 | End: 2025-02-06

## 2025-01-28 RX ORDER — BISACODYL 10 MG
10 SUPPOSITORY, RECTAL RECTAL DAILY PRN
DISCHARGE
Start: 2025-01-28 | End: 2025-02-27

## 2025-01-28 RX ORDER — ASPIRIN 81 MG/1
81 TABLET ORAL DAILY
DISCHARGE
Start: 2025-01-28

## 2025-01-28 RX ORDER — LEVOTHYROXINE SODIUM 125 UG/1
125 TABLET ORAL DAILY
DISCHARGE
Start: 2025-01-28

## 2025-01-28 RX ORDER — SENNOSIDES A AND B 8.6 MG/1
2 TABLET, FILM COATED ORAL DAILY PRN
DISCHARGE
Start: 2025-01-28 | End: 2025-02-27

## 2025-01-28 RX ORDER — AMLODIPINE BESYLATE 10 MG/1
10 TABLET ORAL DAILY
DISCHARGE
Start: 2025-01-28

## 2025-01-28 RX ORDER — POLYETHYLENE GLYCOL 3350 17 G/17G
17 POWDER, FOR SOLUTION ORAL DAILY
DISCHARGE
Start: 2025-01-28 | End: 2025-02-27

## 2025-01-28 RX ORDER — CARVEDILOL 6.25 MG/1
6.25 TABLET ORAL 2 TIMES DAILY WITH MEALS
DISCHARGE
Start: 2025-01-28

## 2025-01-28 RX ADMIN — Medication 3 MG: at 21:53

## 2025-01-28 RX ADMIN — ONDANSETRON 4 MG: 4 TABLET, ORALLY DISINTEGRATING ORAL at 10:27

## 2025-01-28 RX ADMIN — ACETAMINOPHEN 650 MG: 325 TABLET ORAL at 18:08

## 2025-01-28 RX ADMIN — Medication 6 MG: at 21:53

## 2025-01-28 RX ADMIN — ASPIRIN 81 MG: 81 TABLET, COATED ORAL at 08:43

## 2025-01-28 RX ADMIN — ACETAMINOPHEN 650 MG: 325 TABLET ORAL at 08:43

## 2025-01-28 RX ADMIN — POLYETHYLENE GLYCOL 3350 17 G: 17 POWDER, FOR SOLUTION ORAL at 08:43

## 2025-01-28 RX ADMIN — ATORVASTATIN CALCIUM 80 MG: 80 TABLET, FILM COATED ORAL at 21:53

## 2025-01-28 RX ADMIN — MICONAZOLE NITRATE: 20 POWDER TOPICAL at 08:51

## 2025-01-28 RX ADMIN — LEVOTHYROXINE SODIUM 125 MCG: 0.12 TABLET ORAL at 05:33

## 2025-01-28 RX ADMIN — SENNOSIDES 17.2 MG: 8.6 TABLET, FILM COATED ORAL at 18:08

## 2025-01-28 RX ADMIN — DONEPEZIL HYDROCHLORIDE 10 MG: 10 TABLET, FILM COATED ORAL at 21:53

## 2025-01-28 RX ADMIN — LISINOPRIL 40 MG: 20 TABLET ORAL at 08:43

## 2025-01-28 RX ADMIN — AMLODIPINE BESYLATE 10 MG: 10 TABLET ORAL at 08:43

## 2025-01-28 RX ADMIN — ENOXAPARIN SODIUM 40 MG: 100 INJECTION SUBCUTANEOUS at 08:43

## 2025-01-28 RX ADMIN — CARVEDILOL 6.25 MG: 6.25 TABLET, FILM COATED ORAL at 18:08

## 2025-01-28 RX ADMIN — CARVEDILOL 6.25 MG: 6.25 TABLET, FILM COATED ORAL at 08:43

## 2025-01-28 ASSESSMENT — PAIN SCALES - GENERAL
PAINLEVEL_OUTOF10: 0
PAINLEVEL_OUTOF10: 1
PAINLEVEL_OUTOF10: 1
PAINLEVEL_OUTOF10: 3
PAINLEVEL_OUTOF10: 3

## 2025-01-28 ASSESSMENT — PAIN DESCRIPTION - DESCRIPTORS
DESCRIPTORS: DULL;ACHING
DESCRIPTORS: ACHING;DULL

## 2025-01-28 ASSESSMENT — PAIN DESCRIPTION - LOCATION
LOCATION: GENERALIZED
LOCATION: ABDOMEN

## 2025-01-28 ASSESSMENT — PAIN DESCRIPTION - ORIENTATION: ORIENTATION: MID

## 2025-01-28 NOTE — PROGRESS NOTES
Physical Therapy        Physical Therapy Cancel Note      DATE: 2025    NAME: Lanny Jaimes  MRN: [id number]   : 1955      Patient not seen this date for Physical Therapy due to:    Patient Declined: Patient declined due to not feeling good this PM.  Patient was seen this AM, planning for DC.             25 1348   Minute Variance   Variance 45   Reason Illness  (Patient reporting \"I don't feel good\".  Tests ordered per MD.)         Electronically signed by Елена Infante PTA on 2025 at 1:49 PM

## 2025-01-28 NOTE — PROGRESS NOTES
[facility]   OCCUPATIONAL THERAPY MISSED TREATMENT NOTE   ACUTE REHAB  Date: 25  Patient Name: Lanny Jaimes       Room: 2619/2619-01  MRN: [id number]   Account #: [id number]    : 1955  (69 y.o.)  Gender: female   Referring Practitioner: Dr. Daniela Garcia  Diagnosis: Encephalopathy             REASON FOR MISSED TREATMENT:  Patient declined   -    upon arrival pt sitting in recliner, pt states \"I don't feel good\", pt unable to verbalized her symptoms but repeats \"I don't feel good\", pt adamant to return to bed and refusing therapy at this time, this writer providing assist to bed, pt begins to lay supine with suddenly dry heaving, basin provided and pt having 2-3 small episodes of emesis, NSG made aware and LPN Leydi present in room, pt continues to refuse therapy at this time, will continue to follow as needed, anticipated DC this date.     PM: pt refusing PM session due to \"not feeling good\", pt reports she continues to feel \"ill\", will continue to follow as needed.      Electronically signed by Denice DANIELS on 25 at 10:45 AM EST

## 2025-01-28 NOTE — PROGRESS NOTES
[facility] Internal Medicine  Miles Lundberg MD; Chad Gordon MD; Uriel Elliott MD; MD Nely Villa MD; Bonita Argueta MD    [facility] Internal Medicine   IN-PATIENT SERVICE   [facility]    Progress note            Date:   1/28/2025  Patient name:  Lanny Jaimes  Date of admission:  1/17/2025  7:18 PM  MRN:   [id number]  Account:  [id number]  YOB: 1955  PCP:    Juan Antonio Barreto MD  Room:   2619/2619-01  Code Status:    Full Code    Chief Complaint:     No chief complaint on file.  Intracranial hemorrhage    History Obtained From:     Patient medical record nursing staff    History of Present Illness:     Lanny Jaimes is a 69 y.o. Non- / non  female who presents with No chief complaint on file.   and is admitted to the hospital for the management of Encephalopathy.    69-year-old  lady was transferred from [facility] to [facility] with altered mental status found to have small left parietal hemorrhagic contusion after a fall at home  MRI brain was done which shows consistent with hypertensive microangiopathy on presentation patient's systolic blood pressure was over 220  Repeat CTA head and neck showed left ICA cavernous aneurysm neurointervention was consulted has angiogram done which shows right posterior communicating infundibulum and left cavernous medially saccular aneurysm plan for outpatient intervention for left cavernous ICA aneurysm  For deconditioning patient was transferred to acute rehab for further therapy    Past Medical History:     Past Medical History:   Diagnosis Date    Thyroid disease         Past Surgical History:     No past surgical history on file.     Medications Prior to Admission:     Prior to Admission medications    Medication Sig Start Date End Date Taking? Authorizing Provider   aspirin 81 MG EC tablet Take 1 tablet by mouth daily 1/28/25  Yes Brien Root MD  ausculation, no wheezing, rales or rhonchi, normal effort  Cardiovascular: normal rate, regular rhythm, no murmur, gallop, rub  Abdomen: Soft, nontender, nondistended, normal bowel sounds, no hepatomegaly or splenomegaly  Neurologic: There are no new focal motor or sensory deficits, normal muscle tone and bulk, no abnormal sensation, normal speech, cranial nerves II through XII grossly intact  Skin: No gross lesions, rashes, bruising or bleeding on exposed skin area  Extremities: peripheral pulses palpable, no pedal edema or calf pain with palpation  Psych: normal affect    Investigations:      Laboratory Testing:  Recent Results (from the past 24 hour(s))   Basic Metabolic Panel    Collection Time: 01/28/25 12:21 PM   Result Value Ref Range    Sodium 143 136 - 145 mmol/L    Potassium 4.0 3.7 - 5.3 mmol/L    Chloride 105 98 - 107 mmol/L    CO2 30 20 - 31 mmol/L    Anion Gap 8 (L) 9 - 16 mmol/L    Glucose 114 (H) 74 - 99 mg/dL    BUN 24 (H) 8 - 23 mg/dL    Creatinine 0.8 0.7 - 1.2 mg/dL    Est, Glom Filt Rate 80 >60 mL/min/1.73m2    Calcium 9.1 8.6 - 10.4 mg/dL   CBC    Collection Time: 01/28/25 12:21 PM   Result Value Ref Range    WBC 9.3 3.5 - 11.0 k/uL    RBC 3.62 (L) 4.0 - 5.2 m/uL    Hemoglobin 11.0 (L) 12.0 - 16.0 g/dL    Hematocrit 33.7 (L) 36 - 46 %    MCV 93.2 80 - 100 fL    MCH 30.5 26 - 34 pg    MCHC 32.7 31 - 37 g/dL    RDW 15.8 (H) 11.5 - 14.9 %    Platelets 304 150 - 450 k/uL    MPV 7.6 6.0 - 12.0 fL   Respiratory Panel, Molecular, with COVID-19 (Restricted: peds pts or suitable admitted adults)    Collection Time: 01/28/25 12:59 PM    Specimen: Nasopharyngeal Swab; Nasal swab   Result Value Ref Range    Specimen Description .NASOPHARYNGEAL SWAB     Adenovirus PCR Not Detected Not Detected    Coronavirus 229E PCR Not Detected Not Detected    Coronavirus HKU1 PCR Not Detected Not Detected    Coronavirus NL63 PCR Not Detected Not Detected    Coronavirus OC43 PCR Not Detected Not Detected    SARS-CoV-2,

## 2025-01-28 NOTE — DISCHARGE INSTR - DIET

## 2025-01-28 NOTE — PROGRESS NOTES
SPEECH LANGUAGE PATHOLOGY  Speech Language Pathology  [facility] ACUTE REHAB    Cognitive Treatment Note    Date: 1/28/2025  Patient’s Name: Lanny Jamies  MRN: [id number]  Diagnosis:   Patient Active Problem List   Diagnosis Code    Focal hemorrhagic contusion of cerebrum S06.33AA    Acute intracranial hemorrhage (HCC) I62.9    Cerebral amyloid angiopathy (HCC) E85.4, I68.0    Acquired hypothyroidism E03.9    Primary hypertension I10    Fall W19.XXXA    Aneurysm of cavernous portion of left internal carotid artery I67.1    Hypertensive urgency I16.0    Encephalopathy G93.40    Major neurocognitive disorder (HCC) F03.90       Pain Rating: no c/o pain  Pain Location: N/A  Non-Pharmaceutical Pain Intervention: N/A    Cognitive Treatment    Treatment time:       SLP Individual Minutes  Time In: 1133  Time Out: 1144 (session shortened due to Pt illness/refusal)  Minutes: 11       Subjective: [x] Alert [x] Cooperative     [] Confused     [] Agitated    [] Lethargic      Objective/Assessment:  Attention: Pt required prolonged processing/response time .    Orientation: x1/4 temporal, 0/2 spatial concepts. Pt continually stated \"we're in hell\" and unable to be redirected.     Other: ST mad multiple attempts to engage Pt in structured tasks/conversation to target goals, however Pt continually stating \"I don't feel good\" and eventually refused to answer more questions. Session discontinued.     Variance identified in accordance with Speech Therapy individualized plan of care for therapy minutes. Patient missing 11 minutes today, 1/28/25, due to illness.     Patient Education Required? Yes, patient educated on IRF level of care and participation in accordance with individualized plan of care, discussed barriers to participation and compensatory strategies to allow for increased participation to meet goals     Nursing Notification Required? RN aware     PM&R Physician Notification Required? MD aware     Plan:  [x] Continue ST services

## 2025-01-28 NOTE — PROGRESS NOTES
Called as patient not feeling well.  Went to see patient.  Notes feels nauseous feels like she is going to vomit.  Just states she feels sick.  Nursing in process of getting vitals.  Nursing notes no BM for few days but documented medium bowel movement 1/26    117/65, 78, 96% on room air, normal respiratory rate    Heart regular rate rhythm  Lungs clear to auscultation  Abdomen nontender soft positive bowel sounds no guarding or rebound  Extremities no edema, 4+/5 upper and lower extremity  Neuro alert, orientation no change, sensation intact      Assessment/plan  1.  Not feeling well feels nauseous-will check lab work, abdominal film, internal medicine notified plans to check respiratory panel  2.  Hold discharge today discussed with nursing/patient as well as       CBC/BMP okay 1/28/2025, respiratory panel ordered per internal medicine.  DC to SNF when cleared by internal medicine

## 2025-01-28 NOTE — PROGRESS NOTES
Physical Medicine & Rehabilitation  Progress Note    1/28/2025 9:43 AM     CC: Ambulatory and ADL dysfunction due to encephalopathy with impaired cognition    Subjective:   No complaints from patient or staff.  Good spirits.  Positive BM.  Seen in gym    ROS:  Denies fevers, chills, sweats.  No chest pain, palpitations, lightheadedness.  Denies coughing, wheezing or shortness of breath.  Denies abdominal pain, nausea, diarrhea or constipation.  No new areas of joint pain.  Denies new areas of numbness or weakness.  Denies new anxiety or depression issues.  No new skin problems.    Rehabilitation:   PT:    Bed mobility  Bridging: Supervision  Rolling to Left: Supervision  Rolling to Right: Supervision  Supine to Sit: Contact guard assistance (Additional time needed to complete task c bed rail, education for hand placement.  Occasional CGA for balance during task.)  Sit to Supine: Supervision  Scooting: Supervision  Bed Mobility Comments: Mat, wedge, 2 pillows. Entering/exiting to Pt's L.    Transfers  Sit to Stand: Stand by assistance (Patient always attempts to pull from walker, has very poor carryover c education to correct for safety.)  Stand to Sit: Contact guard assistance (Education to reach back, decreased eccentric control.)  Bed to Chair: Contact guard assistance (c walker)  Stand Pivot Transfers: Contact guard assistance (c walker bed<> recliner)  Comment: Toilet transfer Sit<>stand MIN A, hygiene dependent, patient confused on sequence of cleaning herself,donning/doffing pants/brief MAX A, patient having difficulting reaching c sequencing issues, slow processing of task c poor awareness she needed to use restroom.    Ambulation  Surface: Level tile  Device: Rolling Walker  Assistance: Contact guard assistance (CGA with turns;  SBA at times during straight forward amb.)  Quality of Gait: Unsteady, lacks bilateral TKE, flexed posture & downward gaze, NBOS . Decreased step length RLE  Gait Deviations: Slow

## 2025-01-28 NOTE — PLAN OF CARE
Problem: Discharge Planning  Goal: Discharge to home or other facility with appropriate resources  1/28/2025 0150 by Jez Mejia, RN  Outcome: Progressing     Problem: Skin/Tissue Integrity  Goal: Absence of new skin breakdown  Description: 1.  Monitor for areas of redness and/or skin breakdown  2.  Assess vascular access sites hourly  3.  Every 4-6 hours minimum:  Change oxygen saturation probe site  4.  Every 4-6 hours:  If on nasal continuous positive airway pressure, respiratory therapy assess nares and determine need for appliance change or resting period.  1/28/2025 0150 by Jez Mejia, RN  Outcome: Progressing     Problem: Safety - Adult  Goal: Free from fall injury  1/28/2025 0150 by Jez Mejia, RN  Outcome: Progressing     Problem: ABCDS Injury Assessment  Goal: Absence of physical injury  1/28/2025 0150 by Jez Mejia, RN  Outcome: Progressing     Problem: Pain  Goal: Verbalizes/displays adequate comfort level or baseline comfort level  1/28/2025 0150 by Jez Mejia, RN  Outcome: Progressing

## 2025-01-28 NOTE — PROGRESS NOTES
[facility] Internal Medicine  Miles Lundberg MD; Chad Gordon MD; Uriel Elliott MD; MD Nely Villa MD; Bonita Argueta MD    [facility] Internal Medicine   IN-PATIENT SERVICE   [facility]    Progress note            Date:   1/28/2025  Patient name:  Lanny Jaimes  Date of admission:  1/17/2025  7:18 PM  MRN:   [id number]  Account:  [id number]  YOB: 1955  PCP:    Juan Antonio Barreto MD  Room:   2619/2619-01  Code Status:    Full Code    Chief Complaint:     No chief complaint on file.  Intracranial hemorrhage    History Obtained From:     Patient medical record nursing staff    History of Present Illness:     Lanny Jaimes is a 69 y.o. Non- / non  female who presents with No chief complaint on file.   and is admitted to the hospital for the management of Encephalopathy.    69-year-old  lady was transferred from [facility] to [facility] with altered mental status found to have small left parietal hemorrhagic contusion after a fall at home  MRI brain was done which shows consistent with hypertensive microangiopathy on presentation patient's systolic blood pressure was over 220  Repeat CTA head and neck showed left ICA cavernous aneurysm neurointervention was consulted has angiogram done which shows right posterior communicating infundibulum and left cavernous medially saccular aneurysm plan for outpatient intervention for left cavernous ICA aneurysm  For deconditioning patient was transferred to acute rehab for further therapy    Past Medical History:     Past Medical History:   Diagnosis Date    Thyroid disease         Past Surgical History:     No past surgical history on file.     Medications Prior to Admission:     Prior to Admission medications    Medication Sig Start Date End Date Taking? Authorizing Provider   aspirin 81 MG EC tablet Take 1 tablet by mouth daily 1/28/25  Yes Brien Root MD  PCR Not Detected Not Detected    Human Metapneumovirus PCR Not Detected Not Detected    Rhino/Enterovirus PCR Not Detected Not Detected    Influenza A by PCR Not Detected Not Detected    Influenza B by PCR Not Detected Not Detected    Parainfluenza 1 PCR Not Detected Not Detected    Parainfluenza 2 PCR Not Detected Not Detected    Parainfluenza 3 PCR Not Detected Not Detected    Parainfluenza 4 PCR Not Detected Not Detected    Resp Syncytial Virus PCR Not Detected Not Detected    Bordetella parapertussis by PCR Not Detected Not Detected    B Pertussis by PCR Not Detected Not Detected    Chlamydia pneumoniae By PCR Not Detected Not Detected    Mycoplasma pneumo by PCR Not Detected Not Detected           Imaging/Diagnostics:  No results found.    Assessment :      Hospital Problems             Last Modified POA    * (Principal) Encephalopathy 1/17/2025 Yes    Major neurocognitive disorder (HCC) 1/22/2025 Yes       Plan:   69-year-old  lady was transferred from [facility] to [facility] with altered mental status found to have small left parietal hemorrhagic contusion after a fall at home  MRI brain was done which shows consistent with hypertensive microangiopathy on presentation patient's systolic blood pressure was over 220  Repeat CTA head and neck showed left ICA cavernous aneurysm neurointervention was consulted has angiogram done which shows right posterior communicating infundibulum and left cavernous medially saccular aneurysm plan for outpatient intervention for left cavernous ICA aneurysm  For deconditioning patient was transferred to acute rehab for further therapy    Hypertension Norvasc 10 mg daily carvedilol 6.25 twice daily and lisinopril 40 mg  Goal systolic hypertension less than 140  DVT prophylaxis Lovenox 40 mg  Hypothyroidism Synthroid 100 mcg daily  Hyperlipidemia Lipitor 80 mg day  Aspirin 81 mg daily  Blood pressure log noted close monitoring    s.  1/28  Patient seen

## 2025-01-28 NOTE — CARE COORDINATION
Faxed to [facility] via EPIC routing 1/28/25 @ 1256        AFTER VISIT SUMMARY  Lanny Jaimes MRN: [id number]     Encephalopathy   1/17/2025 - 1/28/2025   [facility]   [phone]   Instructions    Your medications have changed   START taking:  amLODIPine (NORVASC)   aspirin   atorvastatin (LIPITOR)   bacitracin   bisacodyl (DULCOLAX)   carvedilol (COREG)   donepezil (ARICEPT)   hydrocortisone   lisinopril (PRINIVIL;ZESTRIL)   melatonin   miconazole (MICOTIN)   ondansetron (ZOFRAN-ODT)   polyethylene glycol (GLYCOLAX)   senna (SENOKOT)    CHANGE how you take:  levothyroxine (SYNTHROID)   Review your updated medication list below.  Your Next Steps   Destination  [facility]  Services: Skilled Nursing  [address] [facility] [address]  [phone]    Ask    Ask how to get these medications  amLODIPine  aspirin  atorvastatin  bacitracin  bisacodyl  carvedilol  donepezil  hydrocortisone  levothyroxine  lisinopril  melatonin  miconazole  ondansetron  polyethylene glycol  senna   Do    Schedule an appointment with Dr. Pedro Ascencio MD as soon as possible for a visit  [address] [address] [address]  [place] [address]  [phone]    Schedule an appointment with Dr. Juan Antonio Barreto MD as soon as possible for a visit in 1 week  [facility]  [phone]   Read    Read these attachments  Stroke: Know the Signs and BE FAST: Video (English)   Go  MAR    4 Follow Up Appointment 2:30 PM  Dr. Jez Mcleod MD  [facility] Physical Medicine & Rehabilitation  [address] [place] [address] [id number] [phone]   You have more future appointments. Please review your full appointment list.    Diet instructions  Good nutrition is important when healing from an illness, injury, or surgery.  Follow any nutrition recommendations given to you during your hospital stay.   If you were given an oral nutrition supplement while in the hospital, continue to take this supplement at home.  You

## 2025-01-28 NOTE — PROGRESS NOTES
Physical Therapy  Facility/Department: [facility] ACUTE REHAB      NAME: Lanny Jaimes  : 1955 (69 y.o.)  MRN: [id number]  CODE STATUS: Full Code    Date of Service: 25      Past Medical History:   Diagnosis Date    Thyroid disease      No past surgical history on file.    Other (Comment): Delayed processing, increased time and repetition required    Restrictions:  Restrictions/Precautions: Fall Risk;Bed Alarm;General Precautions     SUBJECTIVE  Subjective: Patient agreeable to therapy this AM but reporting fatigue.  Planning for DC to SNF this PM around 4 PM.       OBJECTIVE  Vision  Vision: Impaired  Vision Exceptions: Wears glasses at all times    Hearing  Hearing Exceptions: Hard of hearing/hearing concerns       Sensation  Overall Sensation Status: WFL    Functional Mobility  Bed mobility  Rolling to Left: Supervision  Rolling to Right: Supervision  Supine to Sit: Contact guard assistance (Additional time needed to complete task c bed rail, education for hand placement.  Occasional CGA for balance during task.)  Sit to Supine: Supervision  Scooting: Supervision  Transfers  Sit to Stand: Stand by assistance (Patient always attempts to pull from walker, has very poor carryover c education to correct for safety.)  Stand to Sit: Contact guard assistance (Education to reach back, decreased eccentric control.)  Bed to Chair: Contact guard assistance (c walker)  Stand Pivot Transfers: Contact guard assistance  Balance  Posture: Fair  Sitting - Static: Fair;+  Sitting - Dynamic: Fair  Standing - Static: Fair;+  Standing - Dynamic: Fair    Environmental Mobility  Ambulation  Surface: Level tile  Device: Rolling Walker  Assistance: Contact guard assistance (CGA with turns;  SBA at times during straight forward amb.)  Quality of Gait: Unsteady, lacks bilateral TKE, flexed posture & downward gaze, NBOS . Decreased step length RLE  Gait Deviations: Slow Asia;Decreased step length;Decreased step  able to demonstrate Fair+ Dynamic standing balance for improved safety of ADLs and mobility    PLAN OF CARE  Physical Therapy Plan  General Plan:  minutes of therapy at least 5 out of 7 days a week  Current Treatment Recommendations: Strengthening;Functional mobility training;Gait training;Stair training;Transfer training;Endurance training;Safety education & training;Patient/Caregiver education & training;Therapeutic activities;Home exercise program  Safety Devices  Type of Devices: Gait belt;All fall risk precautions in place;Patient at risk for falls;Left in chair;Chair alarm in place;Call light within reach;Nurse notified    EDUCATION  Education  Education Given To: Patient  Education Provided: Role of Therapy;Home Exercise Program;Safety;Mobility Training;Transfer Training;Fall Prevention Strategies  Education Provided Comments: Increased time to process tasks c poor carryover  Education Method: Verbal;Demonstration  Barriers to Learning: Cognition  Education Outcome: Continued education needed      Therapy Time     01/28/25 0900   PT Individual Minutes   Time In 0900   Time Out 0945   Minutes 45            Елена Infante PTA, 01/28/25 at 9:56 AM

## 2025-01-28 NOTE — CARE COORDINATION
ARU CASE MANAGEMENT NOTE:    Admission Date:  1/17/2025 Lanny Jaimes is a 69 y.o.  female    Admitted for : Encephalopathy [G93.40]    Expected Discharge Date: 01/28/25 (Pending SNF Placement)    Called [facility] to verify pickup time for patient. Pickup time confirmed for 4PM    Outside appointments while in ARU: None    DME:none    Will continue to follow for additional discharge needs.    Electronically signed by Laura Martinez RN on 1/28/2025 at 8:38 AM

## 2025-01-28 NOTE — PLAN OF CARE
Problem: Discharge Planning  Goal: Discharge to home or other facility with appropriate resources  1/28/2025 1150 by Cherri Pardo RN  Outcome: Progressing  1/28/2025 0150 by Jez Mejia RN  Outcome: Progressing     Problem: Skin/Tissue Integrity  Goal: Absence of new skin breakdown  1/28/2025 1150 by Cherri Pardo RN  Outcome: Progressing  1/28/2025 0150 by Jez Mejia RN  Outcome: Progressing     Problem: Safety - Adult  Goal: Free from fall injury  1/28/2025 1150 by Cherri Pardo RN  Outcome: Progressing  1/28/2025 0150 by Jez Mejia RN  Outcome: Progressing     Problem: ABCDS Injury Assessment  Goal: Absence of physical injury  1/28/2025 1150 by Cherri Pardo RN  Outcome: Progressing  1/28/2025 0150 by Jez Mejia RN  Outcome: Progressing     Problem: Pain  Goal: Verbalizes/displays adequate comfort level or baseline comfort level  1/28/2025 1150 by Cherri Pardo RN  Outcome: Progressing  1/28/2025 0150 by Jez Mejia RN  Outcome: Progressing

## 2025-01-28 NOTE — CARE COORDINATION
[facility] Acute Inpatient Rehabilitation  Case Management Discharge Note    Discharge Disposition: SNF: [facility]    Discharge Transportation Method: [facility] stretcher ,  Time: 4PM    IMM Letter Status: Patient/Responsible Party agreeable with discharge: Second Signature Obtained, Patient/Responsible Party offered four hours to make informed decision regarding appeal process - Completed Letter Placed in Patient Chart    Home Healthcare Services: Not Applicable    Outpatient Therapy Services: Not Applicable    DME: No DME Needs Identified    Current reconciled medication list sent to subsequent provider via: Electronic Health Record      Laura Martinez RN  Acute Inpatient Rehabilitation Case Management Department  Ph:[phone] Fax: [phone]    Smart Phrase Template Revision: 12/19/2024

## 2025-01-29 LAB
BACTERIA URNS QL MICRO: ABNORMAL
BILIRUB UR QL STRIP: NEGATIVE
CASTS #/AREA URNS LPF: ABNORMAL /LPF
CLARITY UR: CLEAR
COLOR UR: YELLOW
EPI CELLS #/AREA URNS HPF: ABNORMAL /HPF
GLUCOSE UR STRIP-MCNC: NEGATIVE MG/DL
HGB UR QL STRIP.AUTO: NEGATIVE
KETONES UR STRIP-MCNC: NEGATIVE MG/DL
LEUKOCYTE ESTERASE UR QL STRIP: ABNORMAL
NITRITE UR QL STRIP: POSITIVE
PH UR STRIP: 5.5 [PH] (ref 5–8)
PROT UR STRIP-MCNC: NEGATIVE MG/DL
RBC #/AREA URNS HPF: ABNORMAL /HPF
SP GR UR STRIP: 1.02 (ref 1–1.03)
UROBILINOGEN UR STRIP-ACNC: NORMAL EU/DL (ref 0–1)
WBC #/AREA URNS HPF: ABNORMAL /HPF

## 2025-01-29 PROCEDURE — 6360000002 HC RX W HCPCS

## 2025-01-29 PROCEDURE — 97110 THERAPEUTIC EXERCISES: CPT

## 2025-01-29 PROCEDURE — 97129 THER IVNTJ 1ST 15 MIN: CPT

## 2025-01-29 PROCEDURE — 97116 GAIT TRAINING THERAPY: CPT

## 2025-01-29 PROCEDURE — 1180000000 HC REHAB R&B

## 2025-01-29 PROCEDURE — 6370000000 HC RX 637 (ALT 250 FOR IP): Performed by: PHYSICAL MEDICINE & REHABILITATION

## 2025-01-29 PROCEDURE — 6370000000 HC RX 637 (ALT 250 FOR IP)

## 2025-01-29 PROCEDURE — 97530 THERAPEUTIC ACTIVITIES: CPT

## 2025-01-29 PROCEDURE — 97130 THER IVNTJ EA ADDL 15 MIN: CPT

## 2025-01-29 PROCEDURE — 6370000000 HC RX 637 (ALT 250 FOR IP): Performed by: INTERNAL MEDICINE

## 2025-01-29 PROCEDURE — 97150 GROUP THERAPEUTIC PROCEDURES: CPT

## 2025-01-29 PROCEDURE — 99232 SBSQ HOSP IP/OBS MODERATE 35: CPT | Performed by: PHYSICAL MEDICINE & REHABILITATION

## 2025-01-29 PROCEDURE — 99232 SBSQ HOSP IP/OBS MODERATE 35: CPT | Performed by: INTERNAL MEDICINE

## 2025-01-29 RX ORDER — CIPROFLOXACIN 500 MG/1
500 TABLET, FILM COATED ORAL EVERY 12 HOURS SCHEDULED
Status: DISCONTINUED | OUTPATIENT
Start: 2025-01-29 | End: 2025-01-30 | Stop reason: HOSPADM

## 2025-01-29 RX ADMIN — ATORVASTATIN CALCIUM 80 MG: 80 TABLET, FILM COATED ORAL at 21:04

## 2025-01-29 RX ADMIN — ONDANSETRON 4 MG: 4 TABLET, ORALLY DISINTEGRATING ORAL at 16:11

## 2025-01-29 RX ADMIN — MICONAZOLE NITRATE: 20 POWDER TOPICAL at 08:35

## 2025-01-29 RX ADMIN — ONDANSETRON 4 MG: 4 TABLET, ORALLY DISINTEGRATING ORAL at 08:32

## 2025-01-29 RX ADMIN — AMLODIPINE BESYLATE 10 MG: 10 TABLET ORAL at 08:32

## 2025-01-29 RX ADMIN — Medication 6 MG: at 21:04

## 2025-01-29 RX ADMIN — MICONAZOLE NITRATE: 20 POWDER TOPICAL at 21:04

## 2025-01-29 RX ADMIN — CARVEDILOL 6.25 MG: 6.25 TABLET, FILM COATED ORAL at 17:18

## 2025-01-29 RX ADMIN — LISINOPRIL 40 MG: 20 TABLET ORAL at 08:32

## 2025-01-29 RX ADMIN — LEVOTHYROXINE SODIUM 125 MCG: 0.12 TABLET ORAL at 06:31

## 2025-01-29 RX ADMIN — DONEPEZIL HYDROCHLORIDE 10 MG: 10 TABLET, FILM COATED ORAL at 21:04

## 2025-01-29 RX ADMIN — CIPROFLOXACIN HYDROCHLORIDE 500 MG: 500 TABLET, FILM COATED ORAL at 18:02

## 2025-01-29 RX ADMIN — CARVEDILOL 6.25 MG: 6.25 TABLET, FILM COATED ORAL at 08:32

## 2025-01-29 RX ADMIN — ASPIRIN 81 MG: 81 TABLET, COATED ORAL at 08:32

## 2025-01-29 RX ADMIN — ENOXAPARIN SODIUM 40 MG: 100 INJECTION SUBCUTANEOUS at 08:33

## 2025-01-29 NOTE — PLAN OF CARE
Neurology Plan of Care    Neurology was consulted due to increased confusion.  Patient was found to have UTI.  Appears mentation is improved today and at baseline.    Per primary attending, Dr. Root, can hold off consult for now.    Please call if any further questions or neurological changes.    Ivan Benitez, DO  Neurology/Epilepsy

## 2025-01-29 NOTE — PROGRESS NOTES
[facility]   Acute Rehabilitation Occupational Therapy Daily Treatment Note    Date: 25  Patient Name: Lanny Jaimes       Room: 2619/2619-01  MRN: [id number]  Account: [id number]   : 1955  (69 y.o.) Gender: female     Referring Practitioner: Dr. Daniela Garcia  Diagnosis: Encephalopathy  Additional Pertinent Hx: Per chart: Ms. Lanny Jaimes is a 69 y.o. right handed female who was admitted to [facility] on 2025 with Fall (Frequent falls ) and Altered Mental Status.  Patient admitted from [facility] after fall from standing height. Small hemorrhagic parietal lobe contusion found and he was transferred to [place] for trauma and neurosurgery care.Neuro ICU: MRI showing microhemorrhage consistent with possible cerebral amyloid angiopathy. Small L parietal IPH with possible hemorrhagic contusion vs CAA.      Neurosurgery: confirmed multiple foci of microhemorrhage and diffusion restriction with small enhancing lesion R parietal lobe. No indication for surgical intervention. Recommending repeat MRI in a few months and signed off.Neuro endovascular: recommending strict hypertension management with SBP < 140mm Hg for hypertensive angiopathy. No current indication for antiplatelets. Having altered mentation/encephalopathy. DSA performed 25 with finding of L cavernous saccular aneurysm and plan for procedure in the next month or two.    Treatment Diagnosis: Impaired self-care status    Past Medical History:  has a past medical history of Thyroid disease.    Past Surgical History:   has no past surgical history on file.    Restrictions  Restrictions/Precautions  Restrictions/Precautions: Fall Risk, Bed Alarm, General Precautions (telesitter-discontinued in PM per Dr. Root)  Activity Level: Up as Tolerated, Up with Assist  Required Braces or Orthoses?: No     Position Activity Restriction  Other Position/Activity Restrictions: SBP <140, activity as tolerated         Vitals      Subjective  Subjective  Subjective: \"I don't feel good\" pt resting in bed upon arrival, pt initially refusing OT due to feeling ill, encouragement provided with education, pt continuing to refuse, NSG made aware and also encouraging pt to participate in OT, pt agreeable, this writer re-enters room and pt again refusing OT, after several minutes/attempts pt agreeable to participate in \"a little bit\" of OT, once pt in therapy gym pt participated well with increased time and RB's, pt continues to report headache, feeling \"ill\" and fatigue, NSG made aware  Pain Assessment  Pain Assessment: None - Denies Pain    Objective  Cognition  Overall Orientation Status: Impaired  Orientation Level: Oriented to person;Disoriented to place;Disoriented to time;Disoriented to situation    Cognition  Overall Cognitive Status: Exceptions  Arousal/Alertness: Inconsistent responses to stimuli;Delayed responses to stimuli  Following Commands: Follows one step commands with increased time;Follows one step commands with repetition  Attention Span: Attends with cues to redirect;Difficulty dividing attention  Memory: Decreased recall of recent events;Decreased recall of biographical Information  Safety Judgement: Decreased awareness of need for assistance;Decreased awareness of need for safety  Problem Solving: Assistance required to generate solutions;Assistance required to implement solutions  Insights: Decreased awareness of deficits  Initiation: Requires cues for all  Sequencing: Requires cues for all  Cognition Comment: Continued difficulty processing and verbalizing ideas/needs; poor problem-solving ability    Activities of Daily Living  Feeding  Assistance Level: Set-up  Skilled Clinical Factors: per pt report, pt states \"I always need help with that\", pt states she is able to cut her food    Mobility        Roll Left  Assistance Level: Contact guard assist  Skilled Clinical Factors: pt laying on L side, using bed rail to

## 2025-01-29 NOTE — PLAN OF CARE
Problem: Safety - Adult  Goal: Free from fall injury  1/29/2025 0058 by Sherley Sanders LPN  Outcome: Not Progressing     Problem: Discharge Planning  Goal: Discharge to home or other facility with appropriate resources  1/29/2025 0058 by Sherley Sanders LPN  Outcome: Progressing  Flowsheets (Taken 1/28/2025 2220)  Discharge to home or other facility with appropriate resources:   Identify barriers to discharge with patient and caregiver   Arrange for needed discharge resources and transportation as appropriate   Identify discharge learning needs (meds, wound care, etc)   Refer to discharge planning if patient needs post-hospital services based on physician order or complex needs related to functional status, cognitive ability or social support system     Problem: Skin/Tissue Integrity  Goal: Absence of new skin breakdown  Description: 1.  Monitor for areas of redness and/or skin breakdown  2.  Assess vascular access sites hourly  3.  Every 4-6 hours minimum:  Change oxygen saturation probe site  4.  Every 4-6 hours:  If on nasal continuous positive airway pressure, respiratory therapy assess nares and determine need for appliance change or resting period.  1/29/2025 0058 by Sherley Sanders LPN  Outcome: Progressing     Problem: ABCDS Injury Assessment  Goal: Absence of physical injury  1/29/2025 0058 by Sherley Sanders LPN  Outcome: Progressing     Problem: Pain  Goal: Verbalizes/displays adequate comfort level or baseline comfort level  1/29/2025 0058 by Sherley Sanders LPN  Outcome: Progressing     Problem: Safety - Adult  Goal: Free from fall injury  1/29/2025 0058 by Sherley Sanders LPN  Outcome: Not Progressing  1/28/2025 1150 by Pedro /Cherri Suero RN  Outcome: Progressing

## 2025-01-29 NOTE — PROGRESS NOTES
[facility]   ACUTE REHABILITATION  LUNCH GROUP NOTE     Date: 25  Patient Name: Lanny Jaimes      Room: 2619/2619-01        MRN: [id number]    : 1955  (69 y.o.)  Gender: female           Patient participated in the OT Program at  in the Dining Room on this date.  They were in group for 30 minutes     The patient  Spoke only when spoken to. OT practitioner facilitated therapeutic conversation regarding favorite season, family, favorite reading genre      They expressed  tolerance of the activity.      The patient's pain was monitored as they were  able to tolerate Group activity.   Pain level was 0/10.      They were Setup / SBA  with the activity.  Writer observed pt to attempt to cut food using fork and spoon. Writer then educates and attempts to hand pt knife to increased success. Pt then just drops both utensils and asks writer to cut food. Pt only takes 3 bites of chicken and grapes stating she doesn't feel the best. Pt also perseverating on wanting the other patients food.    CARLOS Gonzalez/VINCE

## 2025-01-29 NOTE — PROGRESS NOTES
[facility] Internal Medicine  Milse Lundberg MD; Chad Gordon MD; Uriel Elliott MD; MD Nely Villa MD; Bonita Argueta MD    [facility] Internal Medicine   IN-PATIENT SERVICE   [facility]    Progress note            Date:   1/29/2025  Patient name:  Lanny Jaimes  Date of admission:  1/17/2025  7:18 PM  MRN:   [id number]  Account:  [id number]  YOB: 1955  PCP:    Juan Antonio Barreto MD  Room:   2619/2619-01  Code Status:    Full Code    Chief Complaint:     No chief complaint on file.  Intracranial hemorrhage    History Obtained From:     Patient medical record nursing staff    History of Present Illness:     Lanny Jaimes is a 69 y.o. Non- / non  female who presents with No chief complaint on file.   and is admitted to the hospital for the management of Encephalopathy.    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     Medications Prior to Admission:     Prior to Admission medications    Medication Sig Start Date End Date Taking? Authorizing Provider   aspirin 81 MG EC tablet Take 1 tablet by mouth daily 1/28/25  Yes Brien Root MD

## 2025-01-29 NOTE — PROGRESS NOTES
[facility]   Acute Rehabilitation Physical Therapy Treatment    Date: 25  Patient Name: Lanny Jaimes       Room: 2619/2619-01  MRN: [id number]  Account: [id number]   : 1955  (69 y.o.) Gender: female       Response To Previous Treatment: Patient with no complaints from previous session.  Family/Caregiver Present: No  Follows Commands: Impaired  Other (Comment): Delayed processing, increased time and repetition required          Past Medical History:  has a past medical history of Thyroid disease.    Past Surgical History:   has no past surgical history on file.    Restrictions  Restrictions/Precautions  Restrictions/Precautions: Fall Risk, Bed Alarm, General Precautions (telesitter)  Activity Level: Up as Tolerated, Up with Assist  Required Braces or Orthoses?: No    Subjective  Subjective  Subjective: Pt reports feeling better today than yesterday.  General  General Comments: Pt has telesitter in room upon arrival. Pt is reclined in bed and cooperative to get up with therapy. At end of tx, pt not agreeable to sit up in recliner or chair. Max attempt made to gain pt's cooperation. Pt insists on going back to bed for a while.  Will continue to encourage pt to remain seated for a while. Upon arrival to PM tx, pt's telesitter was removed.     Objective        Orientation  Overall Orientation Status: Impaired  Orientation Level: Oriented to person;Disoriented to place;Disoriented to time;Disoriented to situation          Bed Mobility   Bed mobility  Bridging: Supervision  Rolling to Left: Modified independent  Rolling to Right: Modified independent  Supine to Sit: Supervision  Sit to Supine: Supervision  Scooting: Supervision  Bed Mobility Comments: Flat bed, pt used 1 bedrail    Transfers   Transfers  Sit to Stand: Contact guard assistance  Stand to Sit: Contact guard assistance (lacking eccentric control, pt will \"drop or plop\" into seat.)  Bed to Chair: Contact guard  assistance  Stand Pivot Transfers: Contact guard assistance  Comment: Cues for safe hand positioning. Pt attempts to pull self up into a standing position.     Mobility   Ambulation  Surface: Level tile  Device: Rolling Walker  Assistance: Contact guard assistance  Quality of Gait: scissoring gait at times, slow pace with very narrow SARAH, flexed posture with rounded shoulders, head and gaze downward, flexed hips, flexed knees, short stride  Gait Deviations: Slow Asia;Decreased step length;Decreased step height;Decreased arm swing;Deviated path;Shuffles  Distance: 130ft AM:  160ft PM  Comments: cues to widen SARAH, tactile cues to pull shoulders back and hips into extension for upright posture, pt quickly relaxes posture back into flexed postion with little retention.      Stairs/Curb  Stairs?: Yes  Stairs  # Steps : 12  Stairs Height:  (7.5\" stair well)  Rails: Bilateral  Assistance: Contact guard assistance  Comment: reciprocal ascending and descending       Balance  Balance  Posture: Fair  Sitting - Static: Good;-  Sitting - Dynamic: Fair  Standing - Static: Fair;+  Standing - Dynamic: Fair  Comments: seated Edge of bed unsupported, standing with RW     PT Exercises  PT Exercises  A/AROM Exercises: Seated bilat. LE ex. 2x15 rep 2# ankle weight  Resistive Exercises: 20 reps lime green T-band LAQ (B) LE  Pressure Relief Exercises: STS x5  Functional Mobility Circuit Training: w/c propulsion using bilat LE and attempting bilat UE as an exercise for hand/eye/muscle coordination 115ft.  Dynamic Standing Balance Exercises: Standing balloon volley tolerance 2min.  Exercise Equipment: NuStep, workload 3, 13 min AM;  Workload 3 15 min. PM     Activity Tolerance  Activity Tolerance  Activity Tolerance: Patient tolerated treatment well       Education  Education  Education Given To: Patient  Education Provided: Plan of Care  Education Provided Comments: Increased time to process tasks c poor carryover  Education Method:

## 2025-01-29 NOTE — PROGRESS NOTES
Writer notified Dr. Root of patients resulted CT scan and ammonia levels. He requested an order to consult neurology. Perfect serve sent to Dr. Ivan Benitez to notify of new consult due to increased confusion

## 2025-01-29 NOTE — CARE COORDINATION
ARU CASE MANAGEMENT NOTE:    Admission Date:  1/17/2025 Lanny Jaimes is a 69 y.o.  female    Admitted for : Encephalopathy [G93.40]    Expected Discharge Date: 01/28/25 (SNF: [facility] - Transport 4:00 pm)      Per chart review - note from 12:05 pm yesterday documents start of patient's decline. RN CM not notified until this AM that the patient did not discharge to [facility] yesterday due to increased confusion and a new neurology consult. YOHAN BAILEY called [facility] and spoke with Percy regarding patient's authorization. Percy did not receive a call from nursing yesterday regarding patient's held discharge but did call up to the unit to speak with a nurse yesterday afternoon. Per Percy, patient was approved from 1/28/25 through 2/3/2025 but he is going to verify with the Admissions Director that there was not a specific 'admit by' date attached to the auth. Additionally, patient now has a telesitter. Will need to be free of telesitter for 24 hours prior to discharge to [facility].  Awaiting call back at this time.     YOHAN BAILEY called [facility] to determine if the scheduled 4 pm transport had been cancelled or if the BLS crew were on scene when they were cancelled. Per Nati, the crew was on the unit and cancelled in person by the nursing staff at 1655. Discussed with unit manager as there was not communication with the RN CM.      Outside appointments while in ARU: none    DME: n/a - SNF discharge    Will continue to follow for additional discharge needs.    Electronically signed by Saida Metcalf RN on 1/29/2025 at 8:20 AM    Addendum    RN CM received return call from Percy at [facility]. He confirmed that the patient is able to admit to them through the last covered day of 2/3/2025. RN CM advised that per Dr. CAYDEN Root, the patient would likely be ready to dc tomorrow and that the telesitter would be removed this morning.     Electronically signed by Saida Metcalf RN on 1/29/2025

## 2025-01-29 NOTE — PROGRESS NOTES
Speech Language Pathology  [facility] ACUTE REHAB    Cognitive Treatment Note    Date: 2025  Patient’s Name: Lanny Jaimes  MRN: [id number]  Diagnosis:   Patient Active Problem List   Diagnosis Code    Focal hemorrhagic contusion of cerebrum S06.33AA    Acute intracranial hemorrhage (HCC) I62.9    Cerebral amyloid angiopathy (HCC) E85.4, I68.0    Acquired hypothyroidism E03.9    Primary hypertension I10    Fall W19.XXXA    Aneurysm of cavernous portion of left internal carotid artery I67.1    Hypertensive urgency I16.0    Encephalopathy G93.40    Major neurocognitive disorder (HCC) F03.90       Pain Rating: Pt. Not reporting pain but stating \"I just don't feel good today\".    Cognitive Treatment    Treatment time:       SLP Individual Minutes  Time In: 1133  Time Out: 1203  Minutes: 30       Subjective: [x] Alert [x] Cooperative     [] Confused     [] Agitated    [] Lethargic      Objective/Assessment:    Attention: ST shutting door upon arrival and muting television to reduce external distractions. Pt. Demo internal distraction t/o session and required frequent redirection to tasks. Pt. Perseverating on \"drama\" and occasionally responding to questions off-topic.    Orientation: Oriented to person independently. Oriented to place, year, and  with mod verbal cues. Oriented to year with binary choices. When prompted re: where to find orientation (to time) information, pt. Stated \"whiteboard\" but demo poor carryover of strategy. Verbal education provided re: use of cell phone to increase orientation to time.    Recall: General information questions: 6/10 increased to 10/10 with mod verbal cues or binary choices.     Problem Solving/Reasoning: State category (concrete): 1/3 increased to 3/3 with max verbal cues or binary choices.  Add to category (concrete): 0/3 increased to 3/3 with max verbal cues.     Other: Pt. In wheelchair with chair alarm active and call light left within reach at end of session.    Plan:  [x]

## 2025-01-29 NOTE — PROGRESS NOTES
Physical Medicine & Rehabilitation  Progress Note    1/29/2025 1:28 PM     CC: Ambulatory and ADL dysfunction due to encephalopathy with impaired cognition    Subjective:   No complaints from patient or staff.  Seen in gym.  Feels much better today.  Events of yesterday noted-last night increased impulsivity regarding TeleSitter however better today-appears back to baseline    ROS:  Denies fevers, chills, sweats.  No chest pain, palpitations, lightheadedness.  Denies coughing, wheezing or shortness of breath.  Denies abdominal pain, nausea, diarrhea or constipation.  No new areas of joint pain.  Denies new areas of numbness or weakness.  Denies new anxiety or depression issues.  No new skin problems.    Rehabilitation:   PT:    Bed mobility  Bridging: Supervision  Rolling to Left: Modified independent  Rolling to Right: Modified independent  Supine to Sit: Supervision  Sit to Supine: Supervision  Scooting: Supervision  Bed Mobility Comments: Flat bed, pt used 1 bedrail    Transfers  Sit to Stand: Contact guard assistance  Stand to Sit: Contact guard assistance (lacking eccentric control, pt will \"drop or plop\" into seat.)  Bed to Chair: Contact guard assistance  Stand Pivot Transfers: Contact guard assistance  Comment: Cues for safe hand positioning. Pt attempts to pull self up into a standing position.    Ambulation  Surface: Level tile  Device: Rolling Walker  Assistance: Contact guard assistance  Quality of Gait: scissoring gait at times, slow pace with very narrow SARAH, flexed posture with rounded shoulders, head and gaze downward, flexed hips, flexed knees, short stride  Gait Deviations: Slow Asia, Decreased step length, Decreased step height, Decreased arm swing, Deviated path, Shuffles  Distance: 130ft  Comments: cues to widen SARAH, tactile cues to pull shoulders back and hips into extension for upright posture, pt quickly relaxes posture back into flexed postion with little retention.  More Ambulation?:

## 2025-01-29 NOTE — PROGRESS NOTES
Discussed with Dr. Root about discontinuing the telesitter. Patient has not been impulsive. Dr. Root is fine with getting rid of the telesitter. Called virtual sitter to let them know it was being dc'd. Telesitter is now off.

## 2025-01-30 VITALS
OXYGEN SATURATION: 96 % | BODY MASS INDEX: 23.39 KG/M2 | WEIGHT: 149 LBS | RESPIRATION RATE: 16 BRPM | HEIGHT: 67 IN | SYSTOLIC BLOOD PRESSURE: 149 MMHG | DIASTOLIC BLOOD PRESSURE: 75 MMHG | TEMPERATURE: 97.9 F | HEART RATE: 68 BPM

## 2025-01-30 PROCEDURE — 97110 THERAPEUTIC EXERCISES: CPT

## 2025-01-30 PROCEDURE — 99239 HOSP IP/OBS DSCHRG MGMT >30: CPT | Performed by: PHYSICAL MEDICINE & REHABILITATION

## 2025-01-30 PROCEDURE — 97116 GAIT TRAINING THERAPY: CPT

## 2025-01-30 PROCEDURE — 97542 WHEELCHAIR MNGMENT TRAINING: CPT

## 2025-01-30 PROCEDURE — 97129 THER IVNTJ 1ST 15 MIN: CPT

## 2025-01-30 PROCEDURE — 6370000000 HC RX 637 (ALT 250 FOR IP): Performed by: INTERNAL MEDICINE

## 2025-01-30 PROCEDURE — 97535 SELF CARE MNGMENT TRAINING: CPT

## 2025-01-30 PROCEDURE — 6360000002 HC RX W HCPCS

## 2025-01-30 PROCEDURE — 97530 THERAPEUTIC ACTIVITIES: CPT

## 2025-01-30 PROCEDURE — 6370000000 HC RX 637 (ALT 250 FOR IP)

## 2025-01-30 PROCEDURE — 97130 THER IVNTJ EA ADDL 15 MIN: CPT

## 2025-01-30 RX ORDER — CIPROFLOXACIN 500 MG/1
500 TABLET, FILM COATED ORAL EVERY 12 HOURS SCHEDULED
DISCHARGE
Start: 2025-01-30 | End: 2025-02-03

## 2025-01-30 RX ADMIN — MICONAZOLE NITRATE: 20 POWDER TOPICAL at 07:42

## 2025-01-30 RX ADMIN — AMLODIPINE BESYLATE 10 MG: 10 TABLET ORAL at 07:42

## 2025-01-30 RX ADMIN — ENOXAPARIN SODIUM 40 MG: 100 INJECTION SUBCUTANEOUS at 07:42

## 2025-01-30 RX ADMIN — CIPROFLOXACIN HYDROCHLORIDE 500 MG: 500 TABLET, FILM COATED ORAL at 07:43

## 2025-01-30 RX ADMIN — LEVOTHYROXINE SODIUM 125 MCG: 0.12 TABLET ORAL at 05:30

## 2025-01-30 RX ADMIN — CARVEDILOL 6.25 MG: 6.25 TABLET, FILM COATED ORAL at 07:42

## 2025-01-30 RX ADMIN — LISINOPRIL 40 MG: 20 TABLET ORAL at 07:42

## 2025-01-30 RX ADMIN — ASPIRIN 81 MG: 81 TABLET, COATED ORAL at 07:42

## 2025-01-30 NOTE — CARE COORDINATION
[facility] Acute Inpatient Rehabilitation  Case Management Discharge Note    Discharge Disposition: SNF: [facility]    Discharge Transportation Method: [facility] ,  Time: 1600    IMM Letter Status: Patient/Responsible Party agreeable with discharge: Second Signature Obtained, Patient/Responsible Party offered four hours to make informed decision regarding appeal process - Completed Letter Placed in Patient Chart    Home Healthcare Services: Not Applicable    Outpatient Therapy Services: Not Applicable    DME: No DME Needs Identified    Current reconciled medication list sent to subsequent provider via: Electronic Health Record      Saida Metcalf RN  Acute Inpatient Rehabilitation Case Management Department  Ph:[phone] Fax: [phone]    Smart Phrase Template Revision: 12/19/2024

## 2025-01-30 NOTE — PROGRESS NOTES
Physical Medicine & Rehabilitation  Progress Note    1/30/2025 10:53 AM     CC: Ambulatory and ADL dysfunction due to encephalopathy with impaired cognition    Subjective:   No complaints from patient or staff.  Seen in gym.  Feels well today.  No complaints.  Positive BM today per nursing.      ROS:  Denies fevers, chills, sweats.  No chest pain, palpitations, lightheadedness.  Denies coughing, wheezing or shortness of breath.  Denies abdominal pain, nausea, diarrhea or constipation.  No new areas of joint pain.  Denies new areas of numbness or weakness.  Denies new anxiety or depression issues.  No new skin problems.    Rehabilitation:   PT:    Bed mobility  Bridging: Supervision  Rolling to Left: Modified independent  Rolling to Right: Modified independent  Supine to Sit: Supervision  Sit to Supine: Supervision  Scooting: Supervision  Bed Mobility Comments: Flat bed, pt used 1 bedrail    Transfers  Sit to Stand: Stand by assistance  Stand to Sit: Contact guard assistance (lacking eccentric control, pt will \"drop or plop\" into seat.)  Bed to Chair: Contact guard assistance  Stand Pivot Transfers: Contact guard assistance  Comment: Cues for safe hand positioning. Pt attempts to pull self up from RW into a standing position.    Ambulation  Surface: Level tile  Device: Rolling Walker  Assistance: Contact guard assistance  Quality of Gait: scissoring gait at times, slow pace with very narrow SARAH, flexed posture with rounded shoulders, head and gaze downward, flexed hips, flexed knees, short stride  Gait Deviations: Slow Asia, Decreased step length, Decreased step height, Decreased arm swing, Deviated path, Shuffles  Distance: 175' AM  Comments: cues to widen SARAH, tactile cues to pull shoulders back and hips into extension for upright posture, pt quickly relaxes posture back into flexed postion with little retention.  More Ambulation?: Yes  Ambulation 2  Surface - 2: level tile, ramp  Device 2: Rolling

## 2025-01-30 NOTE — PROGRESS NOTES
Speech Language Pathology  [facility]    Cognitive Treatment Note    Date: 2025  Patient’s Name: Lanny Jaimes  MRN: [id number]  Diagnosis:   Patient Active Problem List   Diagnosis Code    Focal hemorrhagic contusion of cerebrum S06.33AA    Acute intracranial hemorrhage (HCC) I62.9    Cerebral amyloid angiopathy (HCC) E85.4, I68.0    Acquired hypothyroidism E03.9    Primary hypertension I10    Fall W19.XXXA    Aneurysm of cavernous portion of left internal carotid artery I67.1    Hypertensive urgency I16.0    Encephalopathy G93.40    Major neurocognitive disorder (HCC) F03.90       Pain Rating: None reported    Cognitive Treatment    Treatment time:       SLP Individual Minutes  Time In: 1133  Time Out: 1203  Minutes: 30       Subjective: [x] Alert [x] Cooperative     [] Confused     [] Agitated    [] Lethargic      Objective/Assessment:    Attention: Pt. Asleep upon ST arrival, easily aroused and engaged in tx tasks. ST shutting door to reduce external distractions. Pt. Demo intermittent internal distraction t/o session and required occasional redirection to tasks.     Orientation: Pt. Oriented to  and situation independently. Pt. Oriented to place and age (stated \"64\") with min-mod verbal cues. Pt. Disoriented to time (month/JUANITO/year), oriented with mod verbal cues.     Recall: Paragraph facts: 1/5 increased to 4/5 with min-mod verbal cues.     Problem Solving/Reasoning: Multiple uses for objects: 2/8 increased to 7/8 with mod-max verbal/visual cues.    Other: Pt. To d/c from ARU this date. Recommend continued ST upon d/c at SNF. Pt. In wheelchair with chair alarm activated. Call light left within reach at end of session.     Plan:  [x] Continue ST services    [] Discharge from ST:      Discharge recommendations:  [x] Further therapy recommended at discharge.   [] No therapy recommended at discharge.      Treatment completed by: Mague Arvizu M.S. CF-SLP

## 2025-01-30 NOTE — PLAN OF CARE
Problem: Pain  Goal: Verbalizes/displays adequate comfort level or baseline comfort level  1/30/2025 1347 by Lavinia Suresh, RN  Outcome: Progressing  1/30/2025 0253 by Kala Salas, RN  Outcome: Progressing     Problem: Safety - Adult  Goal: Free from fall injury  1/30/2025 1347 by Lavinia Suresh, RN  Outcome: Progressing  1/30/2025 0253 by Kala Salas, RN  Outcome: Progressing

## 2025-01-30 NOTE — CARE COORDINATION
Edit AVS    After Visit Summary  Lanny Jaimes MRN: [id number]     Encephalopathy   1/17/2025 - 1/30/2025   [facility]   [phone]   Instructions    Your medications have changed   START taking:  amLODIPine (NORVASC)   aspirin   atorvastatin (LIPITOR)   bacitracin   bisacodyl (DULCOLAX)   carvedilol (COREG)   ciprofloxacin (CIPRO)   donepezil (ARICEPT)   hydrocortisone   lisinopril (PRINIVIL;ZESTRIL)   melatonin   miconazole (MICOTIN)   ondansetron (ZOFRAN-ODT)   polyethylene glycol (GLYCOLAX)   senna (SENOKOT)    CHANGE how you take:  levothyroxine (SYNTHROID)   Review your updated medication list below.  Your Next Steps   [facility]  Services: Skilled Nursing  [address] [address] [facility] [address]  [phone]    Ask    Ask how to get these medications  amLODIPine  aspirin  atorvastatin  bacitracin  bisacodyl  carvedilol  ciprofloxacin  donepezil  hydrocortisone  levothyroxine  lisinopril  melatonin  miconazole  ondansetron  polyethylene glycol  senna   Do    Schedule an appointment with Dr. Pedro Ascencio MD as soon as possible for a visit  [address] [address] [address]  [place] [address]  [phone]    Schedule an appointment with Dr. Juan Antonio Barreto MD as soon as possible for a visit in 1 week  [facility]  [phone]   Read    Read these attachments  Stroke: Know the Signs and BE FAST: Video (English)   Go  Mar    4 Follow Up Appointment 2:30 PM  Dr. Jez Mcleod MD  [facility] Physical Medicine & Rehabilitation  [address] [place], [place] [id number] [phone]   You have more future appointments. Please review your full appointment list.    Diet instructions  Good nutrition is important when healing from an illness, injury, or surgery.  Follow any nutrition recommendations given to you during your hospital stay.   If you were given an oral nutrition supplement while in the hospital, continue to take this supplement at home.  You can take it with  Contact your provider before stopping medications.    Start Taking  Start Taking    Next Dose Due Morning Afternoon Evening Bedtime As Needed    amLODIPine 10 MG tablet  Your Last Dose Was: 10 mg on January 30, 2025  7:42 AM  Signed by: Dr. Brien Root MD  Commonly known as: NORVASC  Take 1 tablet by mouth daily          aspirin 81 MG EC tablet  Your Last Dose Was: 81 mg on January 30, 2025  7:42 AM  Signed by: Dr. Brien Root MD  Take 1 tablet by mouth daily          atorvastatin 80 MG tablet  Your Last Dose Was: 80 mg on January 29, 2025  9:04 PM  Signed by: Dr. Brien Root MD  Commonly known as: LIPITOR  Take 1 tablet by mouth nightly          bacitracin 500 UNIT/GM ointment  Signed by: Dr. Brien Root MD  Apply topically 2 times daily.          bisacodyl 10 MG suppository  Signed by: Dr. Brien Root MD  Commonly known as: DULCOLAX  Place 1 suppository rectally daily as needed for Constipation          carvedilol 6.25 MG tablet  Your Last Dose Was: 6.25 mg on January 30, 2025  7:42 AM  Signed by: Dr. Brien Root MD  Commonly known as: COREG  Take 1 tablet by mouth 2 times daily (with meals)          ciprofloxacin 500 MG tablet  Your Last Dose Was: 500 mg on January 30, 2025  7:43 AM  Signed by: Dr. Brien Root MD  Commonly known as: CIPRO  Take 1 tablet by mouth every 12 hours for 8 doses          donepezil 10 MG tablet  Your Last Dose Was: 10 mg on January 29, 2025  9:04 PM  Signed by: Dr. Brien Root MD  Commonly known as: ARICEPT  Take 1 tablet by mouth nightly          hydrocortisone 1 % cream  Signed by: Dr. Brien Root MD  Apply topically 2 times daily.          lisinopril 40 MG tablet  Your Last Dose Was: 40 mg on January 30, 2025  7:42 AM  Signed by: Dr. Brien Root MD  Commonly known as: PRINIVIL;ZESTRIL  Take 1 tablet by mouth daily          melatonin 3 MG Tabs tablet  Your Last Dose Was: 6 mg on January 29, 2025  9:04 PM  Signed by: Dr. Brien Root MD  Take 2 tablets by mouth at bedtime

## 2025-01-30 NOTE — PROGRESS NOTES
Physical Therapy  [facility]   Acute Rehabilitation Physical Therapy Treatment    Date: 25  Patient Name: Lanny Jaimes       Room: 2619/2619-01  MRN: [id number]  Account: [id number]   : 1955  (69 y.o.) Gender: female     Response To Previous Treatment: Patient with no complaints from previous session.  Family/Caregiver Present: No  Follows Commands: Impaired  Other (Comment): Delayed processing, increased time and repetition required     Past Medical History:  has a past medical history of Thyroid disease.    Past Surgical History:   has no past surgical history on file.    Restrictions  Restrictions/Precautions  Restrictions/Precautions: Fall Risk, Bed Alarm, General Precautions (telesitter)  Activity Level: Up as Tolerated, Up with Assist  Required Braces or Orthoses?: No  Position Activity Restriction  Other Position/Activity Restrictions: SBP <140, activity as tolerated    Subjective  Subjective  Subjective: Pt up in recliner asking student nurse to go to bed in AM. After encouragement, pt agreeable to PT session. Pt reports feeling better today than yesterday. In PM, pt c/o upset stomach.  Pain  Pre-Pain: 3  Post-Pain: 3  Pain Location: Knee;Left;Right  Pain Descriptor: Aching;Sore  Pain Interventions: Repositioning;Rest     Objective  Orientation  Overall Orientation Status: Impaired  Orientation Level: Oriented to person;Disoriented to place;Disoriented to time;Disoriented to situation  Cognition  Overall Cognitive Status: Exceptions  Arousal/Alertness: Inconsistent responses to stimuli;Delayed responses to stimuli  Following Commands: Follows one step commands with increased time;Follows one step commands with repetition  Attention Span: Attends with cues to redirect;Difficulty dividing attention  Memory: Decreased recall of recent events;Decreased recall of biographical Information  Safety Judgement: Decreased awareness of need for assistance;Decreased awareness of need  supervision  Long Term Goal 4: pt able to negotiate curb sized step with device and supervision for community mobility  Long Term Goal 5: pt able to demonstrate Fair+ Dynamic standing balance for improved safety of ADLs and mobility  Additional Goals?: Yes      Plan of Care  Physical Therapy Plan  General Plan:  minutes of therapy at least 5 out of 7 days a week  Current Treatment Recommendations: Strengthening, Functional mobility training, Gait training, Stair training, Transfer training, Endurance training, Safety education & training, Patient/Caregiver education & training, Therapeutic activities, Home exercise program     Safety Devices  Type of Devices: Gait belt, All fall risk precautions in place, Patient at risk for falls, Left in chair, Chair alarm in place, Call light within reach, Nurse notified, Bed alarm in place, Left in bed (Pt left in bed in AM and in w/c in PM.)     PT Minutes   01/30/25 1000 01/30/25 1325   PT Individual Minutes   Time In 1000 1333   Time Out 1100 1403   Minutes 60 30       Electronically signed by Ericka Prakash PTA on 1/30/25 at 3:14 PM EST

## 2025-01-30 NOTE — PROGRESS NOTES
ACUTE INPATIENT REHABILITATION DISCHARGE  [facility]    Patient Name: Lanny Jaimes  MRN: [id number]     Patient discharged in stable condition as per order of attending physician.     AVS provided by nurse at time of discharge, which includes all necessary medical information pertaining to the patients current course of illness, treatment, medications, post-discharge goals of care, and treatment preferences.     Provision of Current Reconciled Medication List to Patient at Discharge   Indicate the route(s) of transmission of the current reconciled medication list to the patient/family/caregiver. Electronic Health Record (e.g. electronic access to patient portal)     Availability of \"My Chart\" offered to patient as a tool for updated health record.  Steps for activation discussed with patient as mentioned on AVS.      Patient/responsible party verbalize understanding of discharge plan and are in agreement with goal/plan/treatment preferences.     Belongings including Glasses sent with patient/responsible party.      Home medications sent home with patient/responsible party N/A    Car Transfer   Level of assistance required for patient transfer into vehicle:   DEPENDENT: [place] does ALL the effort. Patient does none of the effort to complete the activity. Or, the assistance of 2 or more helpers is required for the patient to complete the activity     High-Risk Drug Classes: Use and Indication   Check if the patient is taking any medications by pharmacological classification     Antibiotic/Antiviral Medication Ordered: Indication for Use - UTI     *If no appropriate indication for use noted, follow up with provider for order clarification     Pain Assessment   Over the past 5 days, how much of the time has pain made it hard for you to sleep at night?   Rarely or not at all   Over the past 5 days, how often have you limited your participation in rehabilitation therapy sessions due to pain?   Rarely or not at all

## 2025-01-30 NOTE — PROGRESS NOTES
Report called to receiving nurse at [facility].  All questions answered and callback number provided.

## 2025-01-30 NOTE — CARE COORDINATION
ARU CASE MANAGEMENT NOTE:    Admission Date:  1/17/2025 Lanny Jaimes is a 69 y.o.  female    Admitted for : Encephalopathy [G93.40]    Expected Discharge Date: 01/30/25 (SNF: [facility])    Called and spoke with Percy at [facility]. Updated him that patient remained telesitter free overnight and that she should be able to discharge today. He states that the afternoon would be a better time for admission for the facility. Requested stretcher transport from [facility] for 3pm. Will call and update the patient's son when transport time is confirmed.       Outside appointments while in ARU: none    DME:n/a - SNF    Will continue to follow for additional discharge needs.    Electronically signed by Saida Metcalf RN on 1/30/2025 at 9:34 AM      Received call from Nati at [facility] - they arranged transport for this patient for 4:00 pm via [facility] ambulance to [facility]. Updated the facility. Called and left HIPAA compliant VM for the patient's son.     Electronically signed by Saida Metcalf RN on 1/30/2025 at 11:51 AM

## 2025-01-30 NOTE — PROGRESS NOTES
[facility]   Acute Rehabilitation Occupational Therapy Daily Treatment Note    Date: 25  Patient Name: Lanny Jaimes       Room: 2619/2619-01  MRN: [id number]  Account: [id number]   : 1955  (69 y.o.) Gender: female       Referring Practitioner: Dr. Daniela Garcia  Diagnosis: Encephalopathy, Acute intracranial hemorrhage   Focal hemorrhagic contusion of cerebrum  Additional Pertinent Hx: Per chart: Ms. Lanny Jaimes is a 69 y.o. right handed female who was admitted to [facility] on 2025 with Fall (Frequent falls ) and Altered Mental Status.  Patient admitted from [facility] after fall from standing height. Small hemorrhagic parietal lobe contusion found and he was transferred to [place] for trauma and neurosurgery care.Neuro ICU: MRI showing microhemorrhage consistent with possible cerebral amyloid angiopathy. Small L parietal IPH with possible hemorrhagic contusion vs CAA.      Neurosurgery: confirmed multiple foci of microhemorrhage and diffusion restriction with small enhancing lesion R parietal lobe. No indication for surgical intervention. Recommending repeat MRI in a few months and signed off.Neuro endovascular: recommending strict hypertension management with SBP < 140mm Hg for hypertensive angiopathy. No current indication for antiplatelets. Having altered mentation/encephalopathy. DSA performed 25 with finding of L cavernous saccular aneurysm and plan for procedure in the next month or two. per MD note : UA consistent with UTI start Cipro    Treatment Diagnosis: Impaired self-care status    Past Medical History:  has a past medical history of Thyroid disease.    Past Surgical History:   has no past surgical history on file.    Restrictions  Restrictions/Precautions  Restrictions/Precautions: Fall Risk, Bed Alarm, General Precautions (telesitter)  Activity Level: Up as Tolerated, Up with Assist  Required Braces or Orthoses?: No     Position Activity

## 2025-01-30 NOTE — PLAN OF CARE
Problem: Discharge Planning  Goal: Discharge to home or other facility with appropriate resources  1/30/2025 0253 by Kala Salas, RN  Outcome: Progressing     Problem: Skin/Tissue Integrity  Goal: Absence of new skin breakdown  Description: 1.  Monitor for areas of redness and/or skin breakdown  2.  Assess vascular access sites hourly  3.  Every 4-6 hours minimum:  Change oxygen saturation probe site  4.  Every 4-6 hours:  If on nasal continuous positive airway pressure, respiratory therapy assess nares and determine need for appliance change or resting period.  1/30/2025 0253 by Kala Salas, RN  Outcome: Progressing     Problem: Safety - Adult  Goal: Free from fall injury  1/30/2025 0253 by Kala Salas, RN  Outcome: Progressing     Problem: ABCDS Injury Assessment  Goal: Absence of physical injury  1/30/2025 0253 by Kala Salas, RN  Outcome: Progressing     Problem: Pain  Goal: Verbalizes/displays adequate comfort level or baseline comfort level  1/30/2025 0253 by Kala Salas, RN  Outcome: Progressing

## 2025-01-31 ENCOUNTER — OUTSIDE SERVICES (OUTPATIENT)
Dept: PRIMARY CARE CLINIC | Age: 70
End: 2025-01-31

## 2025-01-31 DIAGNOSIS — R29.6 FREQUENT FALLS: Primary | ICD-10-CM

## 2025-01-31 DIAGNOSIS — G93.40 ENCEPHALOPATHY, UNSPECIFIED TYPE: ICD-10-CM

## 2025-01-31 DIAGNOSIS — I62.9 ACUTE INTRACRANIAL HEMORRHAGE (HCC): ICD-10-CM

## 2025-01-31 DIAGNOSIS — I67.1 ANEURYSM OF CAVERNOUS PORTION OF LEFT INTERNAL CAROTID ARTERY: ICD-10-CM

## 2025-02-01 ASSESSMENT — ENCOUNTER SYMPTOMS
SINUS PRESSURE: 0
CONSTIPATION: 0
DIARRHEA: 0
NAUSEA: 0
SHORTNESS OF BREATH: 0
SINUS PAIN: 0
COUGH: 0

## 2025-02-01 NOTE — PROGRESS NOTES
Lanny Jaimes is a 69 y.o. female being seen for her weekly follow up.  Location of visit: [facility]    Visit Date: 1/31/2025    Reason for Visit:    Chief Complaint   Patient presents with    Fall        Fall  Pertinent negatives include no fever, headaches or nausea.      Resident had fall at home with hemorrhagic contusion of cerebrum.   She is confused.  She has had a fall last night at facility.  She has bruises on extremities and abrasions on knees related to falls.    Review of Systems   Constitutional:  Negative for chills, fatigue and fever.   HENT:  Negative for congestion, sinus pressure and sinus pain.    Eyes:  Negative for visual disturbance.   Respiratory:  Negative for cough and shortness of breath.    Cardiovascular:  Negative for chest pain, palpitations and leg swelling.   Gastrointestinal:  Negative for constipation, diarrhea and nausea.   Genitourinary:  Negative for difficulty urinating and dysuria.   Musculoskeletal:  Positive for gait problem.   Skin:  Positive for wound.   Neurological:  Positive for weakness. Negative for headaches.   Psychiatric/Behavioral:  Positive for confusion. Negative for sleep disturbance. The patient is not nervous/anxious.         Physical Exam  Vitals and nursing note reviewed.   Constitutional:       Appearance: She is obese.   HENT:      Head: Normocephalic and atraumatic.      Right Ear: External ear normal.      Left Ear: External ear normal.   Cardiovascular:      Rate and Rhythm: Normal rate and regular rhythm.      Heart sounds: Normal heart sounds.   Pulmonary:      Effort: Pulmonary effort is normal.      Breath sounds: Normal breath sounds.   Abdominal:      General: Bowel sounds are normal.      Palpations: Abdomen is soft.   Musculoskeletal:      Cervical back: Normal range of motion and neck supple.      Right lower leg: No edema.      Left lower leg: No edema.   Skin:     General: Skin is warm and dry.   Neurological:      Mental Status:

## 2025-02-07 ENCOUNTER — OUTSIDE SERVICES (OUTPATIENT)
Dept: PRIMARY CARE CLINIC | Age: 70
End: 2025-02-07

## 2025-02-07 DIAGNOSIS — R29.6 FREQUENT FALLS: Primary | ICD-10-CM

## 2025-02-07 DIAGNOSIS — I62.9 ACUTE INTRACRANIAL HEMORRHAGE (HCC): ICD-10-CM

## 2025-02-07 DIAGNOSIS — G93.40 ENCEPHALOPATHY, UNSPECIFIED TYPE: ICD-10-CM

## 2025-02-07 ASSESSMENT — ENCOUNTER SYMPTOMS
DIARRHEA: 0
RHINORRHEA: 0
EYE DISCHARGE: 0
EYE REDNESS: 0
NAUSEA: 0
VOMITING: 0
ABDOMINAL PAIN: 0
COUGH: 0
SHORTNESS OF BREATH: 0
SORE THROAT: 0
WHEEZING: 0
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
## 2025-02-08 NOTE — PROGRESS NOTES
Lanny Jaimes is a 69 y.o. female being seen for her weekly follow up.  Location of visit: [facility]    Visit Date:  2/7/2025     Reason for Visit:    Chief Complaint   Patient presents with    Fall        Fall  Pertinent negatives include no abdominal pain, fever, headaches, nausea or vomiting.      Confusion has improved. She is better oriented and safety awareness has improved.  She is ambulating with one assist. She is using wheelchair for longer distances.  She generally goes to dining room for socialization.     Review of Systems   Constitutional:  Negative for chills and fever.   HENT:  Negative for rhinorrhea and sore throat.    Eyes:  Negative for discharge and redness.   Respiratory:  Negative for cough, shortness of breath and wheezing.    Cardiovascular:  Negative for chest pain and palpitations.   Gastrointestinal:  Negative for abdominal pain, diarrhea, nausea and vomiting.   Genitourinary:  Negative for dysuria and frequency.   Musculoskeletal:  Positive for gait problem. Negative for arthralgias and myalgias.   Neurological:  Positive for weakness. Negative for dizziness, light-headedness and headaches.   Psychiatric/Behavioral:  Positive for confusion. Negative for sleep disturbance.         Physical Exam  Vitals and nursing note reviewed.   Constitutional:       General: She is not in acute distress.     Appearance: She is well-developed. She is not ill-appearing.   HENT:      Head: Normocephalic and atraumatic.      Right Ear: External ear normal.      Left Ear: External ear normal.   Eyes:      General: No scleral icterus.        Right eye: No discharge.         Left eye: No discharge.      Conjunctiva/sclera: Conjunctivae normal.   Neck:      Thyroid: No thyromegaly.      Trachea: No tracheal deviation.   Cardiovascular:      Rate and Rhythm: Normal rate and regular rhythm.      Heart sounds: Normal heart sounds.   Pulmonary:      Effort: Pulmonary effort is normal. No respiratory

## 2025-02-11 ENCOUNTER — OUTSIDE SERVICES (OUTPATIENT)
Dept: PRIMARY CARE CLINIC | Age: 70
End: 2025-02-11

## 2025-02-11 DIAGNOSIS — I62.9 ACUTE INTRACRANIAL HEMORRHAGE (HCC): ICD-10-CM

## 2025-02-11 DIAGNOSIS — R29.6 FREQUENT FALLS: Primary | ICD-10-CM

## 2025-02-11 DIAGNOSIS — I16.0 HYPERTENSIVE URGENCY: ICD-10-CM

## 2025-02-11 DIAGNOSIS — F03.90 MAJOR NEUROCOGNITIVE DISORDER (HCC): ICD-10-CM

## 2025-02-11 ASSESSMENT — ENCOUNTER SYMPTOMS
VOMITING: 0
SHORTNESS OF BREATH: 0
NAUSEA: 0
DIARRHEA: 0
COUGH: 0

## 2025-02-11 NOTE — PROGRESS NOTES
Lanny Jaimes is a 69 y.o. female being seen for her weekly follow up.  Location of visit: [facility]    HPI:  New today: Pt doing okay.  Denies any new complaints or issues.          Review of Systems   Constitutional:  Negative for activity change, appetite change, chills, diaphoresis and fever.   HENT:  Negative for congestion.    Respiratory:  Negative for cough and shortness of breath.    Cardiovascular:  Negative for chest pain and palpitations.   Gastrointestinal:  Negative for diarrhea, nausea and vomiting.   Genitourinary:  Negative for hematuria.   Musculoskeletal:  Negative for arthralgias and myalgias.   Skin:  Negative for rash.   Neurological:  Negative for weakness and headaches.   Psychiatric/Behavioral:  Negative for agitation, dysphoric mood and sleep disturbance. The patient is not nervous/anxious.           Physical Exam  Vitals reviewed.   Constitutional:       General: She is not in acute distress.  HENT:      Head: Normocephalic and atraumatic.      Nose: No congestion or rhinorrhea.   Eyes:      General:         Right eye: No discharge.         Left eye: No discharge.   Cardiovascular:      Rate and Rhythm: Normal rate and regular rhythm.   Pulmonary:      Effort: Pulmonary effort is normal. No respiratory distress.      Breath sounds: Normal breath sounds.   Abdominal:      Palpations: Abdomen is soft.      Tenderness: There is no abdominal tenderness.   Musculoskeletal:      Right lower leg: No edema.      Left lower leg: No edema.   Skin:     General: Skin is warm and dry.   Neurological:      Mental Status: She is alert. Mental status is at baseline.          ASSESSMENT:   Diagnosis Orders   1. Frequent falls        2. Acute intracranial hemorrhage (HCC)        3. Hypertensive urgency        4. Major neurocognitive disorder (HCC)            PLAN:  1. Frequent falls  Assessment & Plan:  Continue therapy   2. Acute intracranial hemorrhage (HCC)  Assessment & Plan:  F/u with neuro as

## 2025-02-17 ENCOUNTER — HOSPITAL ENCOUNTER (INPATIENT)
Age: 70
LOS: 10 days | Discharge: NURSING, REHAB OR LONG-TERM CARE | DRG: 177 | End: 2025-02-27
Attending: STUDENT IN AN ORGANIZED HEALTH CARE EDUCATION/TRAINING PROGRAM | Admitting: INTERNAL MEDICINE
Payer: MEDICARE

## 2025-02-17 ENCOUNTER — APPOINTMENT (OUTPATIENT)
Dept: GENERAL RADIOLOGY | Age: 70
DRG: 177 | End: 2025-02-17
Payer: MEDICARE

## 2025-02-17 ENCOUNTER — APPOINTMENT (OUTPATIENT)
Dept: CT IMAGING | Age: 70
DRG: 177 | End: 2025-02-17
Payer: MEDICARE

## 2025-02-17 DIAGNOSIS — U07.1 COVID-19: Primary | ICD-10-CM

## 2025-02-17 DIAGNOSIS — J18.9 MULTIFOCAL PNEUMONIA: ICD-10-CM

## 2025-02-17 LAB
ANION GAP SERPL CALCULATED.3IONS-SCNC: 10 MMOL/L (ref 9–16)
ARTERIAL PATENCY WRIST A: ABNORMAL
BASOPHILS # BLD: 0.1 K/UL (ref 0–0.2)
BASOPHILS NFR BLD: 1 % (ref 0–2)
BDY SITE: ABNORMAL
BODY TEMPERATURE: 37
BUN SERPL-MCNC: 24 MG/DL (ref 8–23)
CALCIUM SERPL-MCNC: 8.2 MG/DL (ref 8.6–10.4)
CHLORIDE SERPL-SCNC: 111 MMOL/L (ref 98–107)
CO2 SERPL-SCNC: 26 MMOL/L (ref 20–31)
COHGB MFR BLD: 1.1 % (ref 0–5)
CREAT SERPL-MCNC: 0.8 MG/DL (ref 0.7–1.2)
CRP SERPL HS-MCNC: 54.1 MG/L (ref 0–5)
D DIMER PPP FEU-MCNC: 8.52 UG/ML FEU (ref 0–0.59)
EKG ATRIAL RATE: 79 BPM
EKG P AXIS: 73 DEGREES
EKG P-R INTERVAL: 132 MS
EKG Q-T INTERVAL: 376 MS
EKG QRS DURATION: 74 MS
EKG QTC CALCULATION (BAZETT): 431 MS
EKG R AXIS: 6 DEGREES
EKG T AXIS: -116 DEGREES
EKG VENTRICULAR RATE: 79 BPM
EOSINOPHIL # BLD: 0 K/UL (ref 0–0.4)
EOSINOPHILS RELATIVE PERCENT: 0 % (ref 0–4)
ERYTHROCYTE [DISTWIDTH] IN BLOOD BY AUTOMATED COUNT: 15.5 % (ref 11.5–14.9)
FERRITIN SERPL-MCNC: 336 NG/ML
FLUAV RNA RESP QL NAA+PROBE: NOT DETECTED
FLUBV RNA RESP QL NAA+PROBE: NOT DETECTED
GAS FLOW.O2 O2 DELIVERY SYS: ABNORMAL L/MIN
GFR, ESTIMATED: 80 ML/MIN/1.73M2
GLUCOSE SERPL-MCNC: 126 MG/DL (ref 74–99)
HCO3 ARTERIAL: 28.4 MMOL/L (ref 22–26)
HCT VFR BLD AUTO: 32.5 % (ref 36–46)
HGB BLD-MCNC: 10.4 G/DL (ref 12–16)
LACTATE BLDV-SCNC: 1.1 MMOL/L (ref 0.5–2.2)
LYMPHOCYTES NFR BLD: 1.3 K/UL (ref 1–4.8)
LYMPHOCYTES RELATIVE PERCENT: 10 % (ref 24–44)
MAGNESIUM SERPL-MCNC: 2.2 MG/DL (ref 1.6–2.4)
MCH RBC QN AUTO: 29.7 PG (ref 26–34)
MCHC RBC AUTO-ENTMCNC: 32.1 G/DL (ref 31–37)
MCV RBC AUTO: 92.5 FL (ref 80–100)
METHEMOGLOBIN: 0.3 % (ref 0–1.9)
MONOCYTES NFR BLD: 1.1 K/UL (ref 0.1–1.3)
MONOCYTES NFR BLD: 9 % (ref 1–7)
NEUTROPHILS NFR BLD: 80 % (ref 36–66)
NEUTS SEG NFR BLD: 10 K/UL (ref 1.3–9.1)
O2 SAT, ARTERIAL: 94.8 % (ref 95–98)
PCO2 ARTERIAL: 47.5 MMHG (ref 35–45)
PH ARTERIAL: 7.39 (ref 7.35–7.45)
PLATELET # BLD AUTO: 391 K/UL (ref 150–450)
PMV BLD AUTO: 7 FL (ref 6–12)
PO2 ARTERIAL: 79.5 MMHG (ref 80–100)
POSITIVE BASE EXCESS, ART: 2.8 MMOL/L (ref 0–2)
POTASSIUM SERPL-SCNC: 3.8 MMOL/L (ref 3.7–5.3)
PROCALCITONIN SERPL-MCNC: 0.17 NG/ML (ref 0–0.09)
PT. POSITION: ABNORMAL
RBC # BLD AUTO: 3.51 M/UL (ref 4–5.2)
RESPIRATORY RATE: 20
SARS-COV-2 RNA RESP QL NAA+PROBE: DETECTED
SODIUM SERPL-SCNC: 147 MMOL/L (ref 136–145)
SOURCE: ABNORMAL
SPECIMEN DESCRIPTION: ABNORMAL
TROPONIN I SERPL HS-MCNC: 28 NG/L (ref 0–14)
TROPONIN I SERPL HS-MCNC: 28 NG/L (ref 0–14)
WBC OTHER # BLD: 12.4 K/UL (ref 3.5–11)

## 2025-02-17 PROCEDURE — XW0DXM6 INTRODUCTION OF BARICITINIB INTO MOUTH AND PHARYNX, EXTERNAL APPROACH, NEW TECHNOLOGY GROUP 6: ICD-10-PCS | Performed by: INTERNAL MEDICINE

## 2025-02-17 PROCEDURE — 36415 COLL VENOUS BLD VENIPUNCTURE: CPT

## 2025-02-17 PROCEDURE — 87641 MR-STAPH DNA AMP PROBE: CPT

## 2025-02-17 PROCEDURE — 87154 CUL TYP ID BLD PTHGN 6+ TRGT: CPT

## 2025-02-17 PROCEDURE — 2580000003 HC RX 258: Performed by: NURSE PRACTITIONER

## 2025-02-17 PROCEDURE — 6360000002 HC RX W HCPCS: Performed by: NURSE PRACTITIONER

## 2025-02-17 PROCEDURE — 85025 COMPLETE CBC W/AUTO DIFF WBC: CPT

## 2025-02-17 PROCEDURE — 2500000003 HC RX 250 WO HCPCS: Performed by: NURSE PRACTITIONER

## 2025-02-17 PROCEDURE — 93005 ELECTROCARDIOGRAM TRACING: CPT | Performed by: STUDENT IN AN ORGANIZED HEALTH CARE EDUCATION/TRAINING PROGRAM

## 2025-02-17 PROCEDURE — 82805 BLOOD GASES W/O2 SATURATION: CPT

## 2025-02-17 PROCEDURE — 2500000003 HC RX 250 WO HCPCS: Performed by: STUDENT IN AN ORGANIZED HEALTH CARE EDUCATION/TRAINING PROGRAM

## 2025-02-17 PROCEDURE — 71260 CT THORAX DX C+: CPT

## 2025-02-17 PROCEDURE — 99223 1ST HOSP IP/OBS HIGH 75: CPT | Performed by: INTERNAL MEDICINE

## 2025-02-17 PROCEDURE — 2000000000 HC ICU R&B

## 2025-02-17 PROCEDURE — 6370000000 HC RX 637 (ALT 250 FOR IP): Performed by: NURSE PRACTITIONER

## 2025-02-17 PROCEDURE — 71045 X-RAY EXAM CHEST 1 VIEW: CPT

## 2025-02-17 PROCEDURE — 82728 ASSAY OF FERRITIN: CPT

## 2025-02-17 PROCEDURE — 84484 ASSAY OF TROPONIN QUANT: CPT

## 2025-02-17 PROCEDURE — 86140 C-REACTIVE PROTEIN: CPT

## 2025-02-17 PROCEDURE — 6360000002 HC RX W HCPCS: Performed by: STUDENT IN AN ORGANIZED HEALTH CARE EDUCATION/TRAINING PROGRAM

## 2025-02-17 PROCEDURE — 2580000003 HC RX 258: Performed by: STUDENT IN AN ORGANIZED HEALTH CARE EDUCATION/TRAINING PROGRAM

## 2025-02-17 PROCEDURE — 93010 ELECTROCARDIOGRAM REPORT: CPT | Performed by: INTERNAL MEDICINE

## 2025-02-17 PROCEDURE — 87636 SARSCOV2 & INF A&B AMP PRB: CPT

## 2025-02-17 PROCEDURE — 83735 ASSAY OF MAGNESIUM: CPT

## 2025-02-17 PROCEDURE — 6360000004 HC RX CONTRAST MEDICATION: Performed by: STUDENT IN AN ORGANIZED HEALTH CARE EDUCATION/TRAINING PROGRAM

## 2025-02-17 PROCEDURE — 83605 ASSAY OF LACTIC ACID: CPT

## 2025-02-17 PROCEDURE — 87040 BLOOD CULTURE FOR BACTERIA: CPT

## 2025-02-17 PROCEDURE — 2700000000 HC OXYGEN THERAPY PER DAY

## 2025-02-17 PROCEDURE — 5A0945A ASSISTANCE WITH RESPIRATORY VENTILATION, 24-96 CONSECUTIVE HOURS, HIGH NASAL FLOW/VELOCITY: ICD-10-PCS

## 2025-02-17 PROCEDURE — 87205 SMEAR GRAM STAIN: CPT

## 2025-02-17 PROCEDURE — 96374 THER/PROPH/DIAG INJ IV PUSH: CPT

## 2025-02-17 PROCEDURE — 6360000002 HC RX W HCPCS: Performed by: INTERNAL MEDICINE

## 2025-02-17 PROCEDURE — 80048 BASIC METABOLIC PNL TOTAL CA: CPT

## 2025-02-17 PROCEDURE — 85379 FIBRIN DEGRADATION QUANT: CPT

## 2025-02-17 PROCEDURE — 99285 EMERGENCY DEPT VISIT HI MDM: CPT

## 2025-02-17 PROCEDURE — 94761 N-INVAS EAR/PLS OXIMETRY MLT: CPT

## 2025-02-17 PROCEDURE — 84145 PROCALCITONIN (PCT): CPT

## 2025-02-17 PROCEDURE — 36600 WITHDRAWAL OF ARTERIAL BLOOD: CPT

## 2025-02-17 RX ORDER — SODIUM CHLORIDE 0.9 % (FLUSH) 0.9 %
10 SYRINGE (ML) INJECTION PRN
Status: DISCONTINUED | OUTPATIENT
Start: 2025-02-17 | End: 2025-02-27 | Stop reason: HOSPADM

## 2025-02-17 RX ORDER — ACETAMINOPHEN 650 MG/1
650 SUPPOSITORY RECTAL EVERY 6 HOURS PRN
Status: DISCONTINUED | OUTPATIENT
Start: 2025-02-17 | End: 2025-02-27 | Stop reason: HOSPADM

## 2025-02-17 RX ORDER — ATORVASTATIN CALCIUM 80 MG/1
80 TABLET, FILM COATED ORAL NIGHTLY
Status: DISCONTINUED | OUTPATIENT
Start: 2025-02-17 | End: 2025-02-27 | Stop reason: HOSPADM

## 2025-02-17 RX ORDER — IOPAMIDOL 755 MG/ML
75 INJECTION, SOLUTION INTRAVASCULAR
Status: COMPLETED | OUTPATIENT
Start: 2025-02-17 | End: 2025-02-17

## 2025-02-17 RX ORDER — 0.9 % SODIUM CHLORIDE 0.9 %
100 INTRAVENOUS SOLUTION INTRAVENOUS ONCE
Status: COMPLETED | OUTPATIENT
Start: 2025-02-17 | End: 2025-02-17

## 2025-02-17 RX ORDER — POTASSIUM CHLORIDE 1500 MG/1
40 TABLET, EXTENDED RELEASE ORAL PRN
Status: DISCONTINUED | OUTPATIENT
Start: 2025-02-17 | End: 2025-02-27 | Stop reason: HOSPADM

## 2025-02-17 RX ORDER — ENOXAPARIN SODIUM 100 MG/ML
40 INJECTION SUBCUTANEOUS DAILY
Status: DISCONTINUED | OUTPATIENT
Start: 2025-02-17 | End: 2025-02-27 | Stop reason: HOSPADM

## 2025-02-17 RX ORDER — POTASSIUM CHLORIDE 7.45 MG/ML
10 INJECTION INTRAVENOUS PRN
Status: DISCONTINUED | OUTPATIENT
Start: 2025-02-17 | End: 2025-02-27 | Stop reason: HOSPADM

## 2025-02-17 RX ORDER — LEVOTHYROXINE SODIUM 125 UG/1
125 TABLET ORAL DAILY
Status: DISCONTINUED | OUTPATIENT
Start: 2025-02-17 | End: 2025-02-27 | Stop reason: HOSPADM

## 2025-02-17 RX ORDER — BISACODYL 10 MG
10 SUPPOSITORY, RECTAL RECTAL DAILY PRN
Status: DISCONTINUED | OUTPATIENT
Start: 2025-02-17 | End: 2025-02-27 | Stop reason: HOSPADM

## 2025-02-17 RX ORDER — MAGNESIUM SULFATE HEPTAHYDRATE 40 MG/ML
2000 INJECTION, SOLUTION INTRAVENOUS PRN
Status: DISCONTINUED | OUTPATIENT
Start: 2025-02-17 | End: 2025-02-27 | Stop reason: HOSPADM

## 2025-02-17 RX ORDER — DONEPEZIL HYDROCHLORIDE 10 MG/1
10 TABLET, FILM COATED ORAL NIGHTLY
Status: DISCONTINUED | OUTPATIENT
Start: 2025-02-17 | End: 2025-02-27 | Stop reason: HOSPADM

## 2025-02-17 RX ORDER — ONDANSETRON 4 MG/1
4 TABLET, ORALLY DISINTEGRATING ORAL EVERY 8 HOURS PRN
Status: DISCONTINUED | OUTPATIENT
Start: 2025-02-17 | End: 2025-02-27 | Stop reason: HOSPADM

## 2025-02-17 RX ORDER — LISINOPRIL 20 MG/1
40 TABLET ORAL DAILY
Status: DISCONTINUED | OUTPATIENT
Start: 2025-02-17 | End: 2025-02-27 | Stop reason: HOSPADM

## 2025-02-17 RX ORDER — SODIUM CHLORIDE 9 MG/ML
INJECTION, SOLUTION INTRAVENOUS PRN
Status: DISCONTINUED | OUTPATIENT
Start: 2025-02-17 | End: 2025-02-27 | Stop reason: HOSPADM

## 2025-02-17 RX ORDER — SODIUM CHLORIDE 9 MG/ML
INJECTION, SOLUTION INTRAVENOUS CONTINUOUS
Status: ACTIVE | OUTPATIENT
Start: 2025-02-17 | End: 2025-02-18

## 2025-02-17 RX ORDER — ACETAMINOPHEN 325 MG/1
650 TABLET ORAL EVERY 6 HOURS PRN
Status: DISCONTINUED | OUTPATIENT
Start: 2025-02-17 | End: 2025-02-27 | Stop reason: HOSPADM

## 2025-02-17 RX ORDER — CARVEDILOL 6.25 MG/1
6.25 TABLET ORAL 2 TIMES DAILY WITH MEALS
Status: DISCONTINUED | OUTPATIENT
Start: 2025-02-17 | End: 2025-02-20

## 2025-02-17 RX ORDER — DEXAMETHASONE SODIUM PHOSPHATE 10 MG/ML
6 INJECTION, SOLUTION INTRAMUSCULAR; INTRAVENOUS ONCE
Status: COMPLETED | OUTPATIENT
Start: 2025-02-17 | End: 2025-02-17

## 2025-02-17 RX ORDER — DEXAMETHASONE 6 MG/1
6 TABLET ORAL DAILY
Status: DISCONTINUED | OUTPATIENT
Start: 2025-02-18 | End: 2025-02-17

## 2025-02-17 RX ORDER — CLINDAMYCIN PHOSPHATE 600 MG/50ML
600 INJECTION, SOLUTION INTRAVENOUS EVERY 8 HOURS
Status: DISCONTINUED | OUTPATIENT
Start: 2025-02-17 | End: 2025-02-18

## 2025-02-17 RX ORDER — SODIUM CHLORIDE 0.9 % (FLUSH) 0.9 %
5-40 SYRINGE (ML) INJECTION EVERY 12 HOURS SCHEDULED
Status: DISCONTINUED | OUTPATIENT
Start: 2025-02-17 | End: 2025-02-27 | Stop reason: HOSPADM

## 2025-02-17 RX ORDER — DEXAMETHASONE SODIUM PHOSPHATE 10 MG/ML
6 INJECTION, SOLUTION INTRAMUSCULAR; INTRAVENOUS DAILY
Status: DISCONTINUED | OUTPATIENT
Start: 2025-02-18 | End: 2025-02-19

## 2025-02-17 RX ORDER — POLYETHYLENE GLYCOL 3350 17 G/17G
17 POWDER, FOR SOLUTION ORAL DAILY PRN
Status: DISCONTINUED | OUTPATIENT
Start: 2025-02-17 | End: 2025-02-27 | Stop reason: HOSPADM

## 2025-02-17 RX ORDER — AMLODIPINE BESYLATE 10 MG/1
10 TABLET ORAL DAILY
Status: DISCONTINUED | OUTPATIENT
Start: 2025-02-17 | End: 2025-02-27 | Stop reason: HOSPADM

## 2025-02-17 RX ORDER — ASPIRIN 81 MG/1
81 TABLET ORAL DAILY
Status: DISCONTINUED | OUTPATIENT
Start: 2025-02-17 | End: 2025-02-27 | Stop reason: HOSPADM

## 2025-02-17 RX ORDER — ONDANSETRON 2 MG/ML
4 INJECTION INTRAMUSCULAR; INTRAVENOUS EVERY 6 HOURS PRN
Status: DISCONTINUED | OUTPATIENT
Start: 2025-02-17 | End: 2025-02-27 | Stop reason: HOSPADM

## 2025-02-17 RX ORDER — POLYETHYLENE GLYCOL 3350 17 G/17G
17 POWDER, FOR SOLUTION ORAL DAILY
Status: DISCONTINUED | OUTPATIENT
Start: 2025-02-17 | End: 2025-02-27 | Stop reason: HOSPADM

## 2025-02-17 RX ADMIN — CLINDAMYCIN IN 5 PERCENT DEXTROSE 600 MG: 12 INJECTION, SOLUTION INTRAVENOUS at 20:55

## 2025-02-17 RX ADMIN — SODIUM CHLORIDE, PRESERVATIVE FREE 10 ML: 5 INJECTION INTRAVENOUS at 04:55

## 2025-02-17 RX ADMIN — LISINOPRIL 40 MG: 20 TABLET ORAL at 08:41

## 2025-02-17 RX ADMIN — POLYETHYLENE GLYCOL 3350 17 G: 17 POWDER, FOR SOLUTION ORAL at 08:42

## 2025-02-17 RX ADMIN — CARVEDILOL 6.25 MG: 6.25 TABLET, FILM COATED ORAL at 08:48

## 2025-02-17 RX ADMIN — VANCOMYCIN HYDROCHLORIDE 750 MG: 750 INJECTION, POWDER, LYOPHILIZED, FOR SOLUTION INTRAVENOUS at 19:51

## 2025-02-17 RX ADMIN — AZTREONAM 2000 MG: 2 INJECTION, POWDER, LYOPHILIZED, FOR SOLUTION INTRAMUSCULAR; INTRAVENOUS at 11:12

## 2025-02-17 RX ADMIN — CARVEDILOL 6.25 MG: 6.25 TABLET, FILM COATED ORAL at 17:22

## 2025-02-17 RX ADMIN — IOPAMIDOL 75 ML: 755 INJECTION, SOLUTION INTRAVENOUS at 04:56

## 2025-02-17 RX ADMIN — ENOXAPARIN SODIUM 40 MG: 100 INJECTION SUBCUTANEOUS at 08:42

## 2025-02-17 RX ADMIN — VANCOMYCIN HYDROCHLORIDE 1750 MG: 1 INJECTION, POWDER, LYOPHILIZED, FOR SOLUTION INTRAVENOUS at 06:39

## 2025-02-17 RX ADMIN — SODIUM CHLORIDE 100 ML: 9 INJECTION, SOLUTION INTRAVENOUS at 04:55

## 2025-02-17 RX ADMIN — SODIUM CHLORIDE, PRESERVATIVE FREE 10 ML: 5 INJECTION INTRAVENOUS at 08:57

## 2025-02-17 RX ADMIN — LEVOTHYROXINE SODIUM 125 MCG: 0.12 TABLET ORAL at 08:48

## 2025-02-17 RX ADMIN — DEXAMETHASONE SODIUM PHOSPHATE 6 MG: 10 INJECTION, SOLUTION INTRAMUSCULAR; INTRAVENOUS at 05:19

## 2025-02-17 RX ADMIN — AZTREONAM 2000 MG: 2 INJECTION, POWDER, LYOPHILIZED, FOR SOLUTION INTRAMUSCULAR; INTRAVENOUS at 17:25

## 2025-02-17 RX ADMIN — MICONAZOLE NITRATE: 20 POWDER TOPICAL at 11:12

## 2025-02-17 RX ADMIN — AMLODIPINE BESYLATE 10 MG: 10 TABLET ORAL at 08:41

## 2025-02-17 RX ADMIN — ASPIRIN 81 MG: 81 TABLET, COATED ORAL at 08:41

## 2025-02-17 RX ADMIN — SODIUM CHLORIDE: 9 INJECTION, SOLUTION INTRAVENOUS at 08:50

## 2025-02-17 RX ADMIN — SODIUM CHLORIDE, PRESERVATIVE FREE 10 ML: 5 INJECTION INTRAVENOUS at 21:00

## 2025-02-17 RX ADMIN — MICONAZOLE NITRATE: 20 POWDER TOPICAL at 21:00

## 2025-02-17 ASSESSMENT — PAIN - FUNCTIONAL ASSESSMENT: PAIN_FUNCTIONAL_ASSESSMENT: NONE - DENIES PAIN

## 2025-02-17 NOTE — ED NOTES
Patient became hypoxic on nasal cannula. NRBM placed on patient. Dr. Martinez notified. RT notified. Patient placed on Salter NC at 6L with improvement in her O2 saturations

## 2025-02-17 NOTE — H&P
[facility] Internal [facility]  Uriel Elliott MD; Srinivasan Zaragoza MD, Chad Gordon MD, Juliet Murillo MD,    Bonita Argueta MD, Janeen Richard MD.    [facility] Internal Medicine   IN-PATIENT SERVICE   [facility]    HISTORY AND PHYSICAL EXAMINATION            Date:   2/17/2025  Patient name:  Lanny Jaimes  Date of admission:  2/17/2025  3:13 AM  MRN:   [id number]  Account:  [id number]  YOB: 1955  PCP:    Juan Antonio Barreto MD  Room:   2090/2090-01  Code Status:    Full Code    Chief Complaint:     Chief Complaint   Patient presents with    Cough    Cold Symptoms     Hypoxic at the [facility] home, has been febrile and coughing.        History Obtained From:     Patient/EMR/Bedside RN    History of Present Illness:     Lanny Jaimes is a 69 y.o. Non- / non  female who presents with Cough and Cold Symptoms (Hypoxic at the [facility] home, has been febrile and coughing. )   and is admitted to the hospital for the management of Multifocal pneumonia.     Patient's medical history significant for cerebral amyloid angiopathy, frequent falls, major neurocognitive disorder, HTN, and acquired hypothyroidism.     Patient has dementia at baseline and is unable to provide any input into HPI, other than stating she has not been feeling well.  According to ED provider, patient presented from ECF per EMS after she was noted to be not acting her usual self.  She was noted to be hypoxic with SpO2 in the 80s.  EMS applied nonrebreather and transported to ED. review of epic indicates the patient was recently discharged from ARU after receiving rehabilitative services for CVA.     CXR obtained in ED shows new patchy opacities in the right mid to lower lung more than the left lower lung.  Features could represent pneumonia in the correct clinical setting. Borderline size of the heart and central vasculature and could true mild pulmonary vascular congestion.  Rapid swab

## 2025-02-17 NOTE — ED NOTES
Blood cultures x 2 done , one blood culture from left antecubital area and second blood culture from right forearm. Lactic acid also drawn.

## 2025-02-17 NOTE — CARE COORDINATION
Case Management Assessment  Initial Evaluation    Date/Time of Evaluation: 2/17/2025 11:00AM  Assessment Completed by: Nila Alvares RN    If patient is discharged prior to next notation, then this note serves as note for discharge by case management.    Patient Name: Lanny Jaimes                   YOB: 1955  Diagnosis: Multifocal pneumonia [J18.9]  COVID-19 [U07.1]                   Date / Time: 2/17/2025  3:13 AM    Patient Admission Status: Inpatient   Readmission Risk (Low < 19, Mod (19-27), High > 27): Readmission Risk Score: 22.7    Current PCP: Juan Antonio Barreto MD  PCP verified by CM? Yes    Chart Reviewed: Yes      History Provided by: Child/Family, Medical Record  Patient Orientation: Other (see comment) (oriented to person only)    Patient Cognition: Dementia / Early Alzheimer's    Hospitalization in the last 30 days (Readmission):  Yes    If yes, Readmission Assessment in  Navigator will be completed.    Advance Directives:      Code Status: Full Code   Patient's Primary Decision Maker is: Legal Next of Kin    Primary Decision Maker: kim Friedman - Child - [phone]    Discharge Planning:    Patient lives with: Other (Comment) (From SNF) Type of Home: Skilled Nursing Facility  Primary Care Giver: Other (Comment) (From SNF)  Patient Support Systems include: Children, Family Members   Current Financial resources: Medicare  Current community resources: ECF/Home Care  Current services prior to admission: Skilled Nursing Facility, Durable Medical Equipment            Current DME: Walker            Type of Home Care services:  None    ADLS  Prior functional level: Assistance with the following:, Bathing, Dressing, Toileting, Cooking, Housework, Shopping, Mobility  Current functional level: Assistance with the following:, Bathing, Dressing, Toileting, Shopping, Housework, Cooking, Mobility    PT AM-PAC:   /24  OT AM-PAC:   /24    Family can provide assistance at DC: No  Would you like Case

## 2025-02-17 NOTE — DISCHARGE INSTR - COC
Continuity of Care Form    Patient Name: Lanny Jaimes   :  1955  MRN:  [id number]    Admit date:  2025  Discharge date:  25    Code Status Order: Full Code   Advance Directives:   Advance Care Flowsheet Documentation             Admitting Physician:  Bonita Argueta MD  PCP: Juan Antonio Barreto MD    Discharging Nurse:   Discharging Hospital Unit/Room#: 2090/209-01  Discharging Unit Phone Number: [phone]    Emergency Contact:   Extended Emergency Contact Information  Primary Emergency Contact: kim Friedman  Mobile Phone: [phone]  Relation: Child  Preferred language: English   needed? No  Secondary Emergency Contact: Lavinia Blanc  Mobile Phone: [phone]  Relation: Friend  Preferred language: English   needed? No    Past Surgical History:  No past surgical history on file.    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

## 2025-02-17 NOTE — ACP (ADVANCE CARE PLANNING)
Advance Care Planning     Advance Care Planning Activator (Inpatient)  Conversation Note      Date of ACP Conversation: 2/17/2025     Conversation Conducted with: Legal next of kin    ACP Activator: Nila Alvares RN    Health Care Decision Maker:     Current Designated Health Care Decision Maker:     Primary Decision Maker: kim Friedman  Theresa - [phone]  Click here to complete Healthcare Decision Makers including section of the Healthcare Decision Maker Relationship (ie \"Primary\")  Today we documented Decision Maker(s) consistent with Legal Next of Kin hierarchy.    Care Preferences    Ventilation:  \"If you were in your present state of health and suddenly became very ill and were unable to breathe on your own, what would your preference be about the use of a ventilator (breathing machine) if it were available to you?\"      Would the patient desire the use of ventilator (breathing machine)?: yes    \"If your health worsens and it becomes clear that your chance of recovery is unlikely, what would your preference be about the use of a ventilator (breathing machine) if it were available to you?\"     Would the patient desire the use of ventilator (breathing machine)?: No      Resuscitation  \"CPR works best to restart the heart when there is a sudden event, like a heart attack, in someone who is otherwise healthy. Unfortunately, CPR does not typically restart the heart for people who have serious health conditions or who are very sick.\"    \"In the event your heart stopped as a result of an underlying serious health condition, would you want attempts to be made to restart your heart (answer \"yes\" for attempt to resuscitate) or would you prefer a natural death (answer \"no\" for do not attempt to resuscitate)?\" yes       [] Yes   [] No   Educated Patient / Decision Maker regarding differences between Advance Directives and portable DNR orders.    Length of ACP Conversation in minutes:      Conversation

## 2025-02-17 NOTE — ED PROVIDER NOTES
[facility]  Emergency Department Encounter  Emergency Medicine Physician     Pt Name: Lanny Jaimes  MRN: [id number]  Birthdate 1955  Date of evaluation: 2/17/25  PCP:  Juan Antonio Barreto MD    CHIEF COMPLAINT       Chief Complaint   Patient presents with    Cough    Cold Symptoms     Hypoxic at the senior care home, has been febrile and coughing.        HISTORY OF PRESENT ILLNESS  (Location/Symptom, Timing/Onset, Context/Setting, Quality, Duration, Modifying Factors, Severity.)    Lanny Jaimes is a 69 y.o. female who presents with shortness of breath. Lives at nursing home secondary to stroke. Found to be hypoxic and somewhat lethargic at care facility. Pulse ox on NRB at facility was 84% so they called EMS. EMS was able to get patient up to 95% on NRB. On arrival, patient alert and answers some questions appropriately.          PAST MEDICAL / SURGICAL / SOCIAL / FAMILY HISTORY    has a past medical history of Thyroid disease.     has no past surgical history on file.    No family history on file.    Allergies:    Pcn [penicillins]    Home Medications:  Prior to Admission medications    Medication Sig Start Date End Date Taking? Authorizing Provider   aspirin 81 MG EC tablet Take 1 tablet by mouth daily 1/28/25  Yes Brien Root MD   ondansetron (ZOFRAN-ODT) 4 MG disintegrating tablet Take 1 tablet by mouth every 8 hours as needed for Nausea or Vomiting 1/28/25  Yes Brien Root MD   atorvastatin (LIPITOR) 80 MG tablet Take 1 tablet by mouth nightly 1/28/25  Yes Brien Root MD   lisinopril (PRINIVIL;ZESTRIL) 40 MG tablet Take 1 tablet by mouth daily 1/28/25  Yes Brien Root MD   carvedilol (COREG) 6.25 MG tablet Take 1 tablet by mouth 2 times daily (with meals) 1/28/25  Yes Brien Root MD   amLODIPine (NORVASC) 10 MG tablet Take 1 tablet by mouth daily 1/28/25  Yes Brien Root MD   miconazole (MICOTIN) 2 % powder Apply topically 2 times daily. 1/28/25  Yes Brien Root MD

## 2025-02-17 NOTE — CONSULTS
[facility]   CONSULT NOTE:      DATE OF CONSULT 2/17/2025    REASON FOR CONSULTATION:  Pulmonary and critical care management      PCP Juan Antonio Barreto MD     CHIEF COMPLAINT: Hypoxemia COVID    HISTORY OF PRESENT ILLNESS:     Lanny is a 69-year-old white female with a history of worsening cough, congestion, and hypoxemia who was transferred from the [facility] for poor appetite and generalized malaise.  She was also noted to be hypoxic when EMS transferred her with oxygen saturation in the 80s.  A nonrebreather was applied by EMS.  She is normally not on oxygen and she was 5 L when she was initially admitted.  This was decreased to 4 L with oxygen saturation around 93%.  She was able to take her pills and liquid without any issues.    She did not eat breakfast or lunch because she apparently was not hungry.  However at dinnertime she had some chicken tenders and was noted by the student nurse to aspirate.  The patient states that this has happened to her before.  Subsequently, her saturation dropped to 75% on 4 L and she was quickly increased in terms of her oxygen up to 15 L nonrebreather.  An ABG showed a pH of 7.39 with a pCO2 of 47, and a pO2 of 79.  She has since been titrated down to approximately 12 L.  She reports that during the day she has become more short of breath.    The patient has cerebral amyloid angiopathy with significant dementia and frequent falls.  She has also hypertensive angiopathy with microhemorrhages.  She had an intraparenchymal hemorrhage (parietal lobe).  She was initially at [facility] in the emergency room on January 7 and then transferred to [place] after her fall.  She was seen by trauma and neurosurgery who recommended no surgical intervention.  Neuroendovascular also recommended monitoring her blood pressure keeping her systolic blood pressure greater than 140.  She was then eventually transferred to acute rehab.  She was admitted on

## 2025-02-17 NOTE — ED NOTES
Incontinent of urine and bed linens changed and external urinary catheter placed and new brief applied, repositioned in bed.

## 2025-02-18 PROBLEM — U07.1 COVID-19: Status: ACTIVE | Noted: 2025-02-18

## 2025-02-18 LAB
ALBUMIN SERPL-MCNC: 3 G/DL (ref 3.5–5.2)
ALP SERPL-CCNC: 67 U/L (ref 35–104)
ALT SERPL-CCNC: 8 U/L (ref 10–35)
ANION GAP SERPL CALCULATED.3IONS-SCNC: 10 MMOL/L (ref 9–16)
AST SERPL-CCNC: 19 U/L (ref 10–35)
BASOPHILS # BLD: 0 K/UL (ref 0–0.2)
BASOPHILS NFR BLD: 0 % (ref 0–2)
BILIRUB DIRECT SERPL-MCNC: 0.2 MG/DL (ref 0–0.3)
BILIRUB INDIRECT SERPL-MCNC: ABNORMAL MG/DL (ref 0–1)
BILIRUB SERPL-MCNC: 0.2 MG/DL (ref 0–1.2)
BUN SERPL-MCNC: 21 MG/DL (ref 8–23)
CALCIUM SERPL-MCNC: 8.6 MG/DL (ref 8.6–10.4)
CHLORIDE SERPL-SCNC: 112 MMOL/L (ref 98–107)
CO2 SERPL-SCNC: 26 MMOL/L (ref 20–31)
CREAT SERPL-MCNC: 0.6 MG/DL (ref 0.7–1.2)
CRP SERPL HS-MCNC: 106 MG/L (ref 0–5)
D DIMER PPP FEU-MCNC: 6.98 UG/ML FEU (ref 0–0.59)
DATE LAST DOSE: NORMAL
EOSINOPHIL # BLD: 0 K/UL (ref 0–0.4)
EOSINOPHILS RELATIVE PERCENT: 0 % (ref 0–4)
ERYTHROCYTE [DISTWIDTH] IN BLOOD BY AUTOMATED COUNT: 15.8 % (ref 11.5–14.9)
FERRITIN SERPL-MCNC: 399 NG/ML
GFR, ESTIMATED: >90 ML/MIN/1.73M2
GLUCOSE SERPL-MCNC: 132 MG/DL (ref 74–99)
HCT VFR BLD AUTO: 32 % (ref 36–46)
HGB BLD-MCNC: 10.4 G/DL (ref 12–16)
LDH SERPL-CCNC: 225 U/L (ref 135–214)
LYMPHOCYTES NFR BLD: 0.94 K/UL (ref 1–4.8)
LYMPHOCYTES RELATIVE PERCENT: 6 % (ref 24–44)
MAGNESIUM SERPL-MCNC: 2.1 MG/DL (ref 1.6–2.4)
MCH RBC QN AUTO: 29.9 PG (ref 26–34)
MCHC RBC AUTO-ENTMCNC: 32.6 G/DL (ref 31–37)
MCV RBC AUTO: 91.8 FL (ref 80–100)
MICROORGANISM SPEC CULT: ABNORMAL
MONOCYTES NFR BLD: 1.09 K/UL (ref 0.1–1.3)
MONOCYTES NFR BLD: 7 % (ref 1–7)
MORPHOLOGY: ABNORMAL
MRSA, DNA, NASAL: ABNORMAL
NEUTROPHILS NFR BLD: 87 % (ref 36–66)
NEUTS SEG NFR BLD: 13.57 K/UL (ref 1.3–9.1)
PLATELET # BLD AUTO: 382 K/UL (ref 150–450)
PMV BLD AUTO: 7.4 FL (ref 6–12)
POTASSIUM SERPL-SCNC: 3.3 MMOL/L (ref 3.7–5.3)
POTASSIUM SERPL-SCNC: 4.2 MMOL/L (ref 3.7–5.3)
PROT SERPL-MCNC: 6.2 G/DL (ref 6.6–8.7)
RBC # BLD AUTO: 3.48 M/UL (ref 4–5.2)
SERVICE CMNT-IMP: ABNORMAL
SODIUM SERPL-SCNC: 148 MMOL/L (ref 136–145)
SPECIMEN DESCRIPTION: ABNORMAL
SPECIMEN DESCRIPTION: ABNORMAL
TME LAST DOSE: NORMAL H
VANCOMYCIN DOSE: NORMAL MG
VANCOMYCIN SERPL-MCNC: 12.1 UG/ML (ref 5–40)
WBC OTHER # BLD: 15.6 K/UL (ref 3.5–11)

## 2025-02-18 PROCEDURE — 36415 COLL VENOUS BLD VENIPUNCTURE: CPT

## 2025-02-18 PROCEDURE — 80048 BASIC METABOLIC PNL TOTAL CA: CPT

## 2025-02-18 PROCEDURE — 82728 ASSAY OF FERRITIN: CPT

## 2025-02-18 PROCEDURE — 87040 BLOOD CULTURE FOR BACTERIA: CPT

## 2025-02-18 PROCEDURE — 83735 ASSAY OF MAGNESIUM: CPT

## 2025-02-18 PROCEDURE — 2700000000 HC OXYGEN THERAPY PER DAY

## 2025-02-18 PROCEDURE — 97166 OT EVAL MOD COMPLEX 45 MIN: CPT

## 2025-02-18 PROCEDURE — 6360000002 HC RX W HCPCS: Performed by: INTERNAL MEDICINE

## 2025-02-18 PROCEDURE — G0545 PR INHERENT VISIT TO INPT: HCPCS | Performed by: INTERNAL MEDICINE

## 2025-02-18 PROCEDURE — 2580000003 HC RX 258: Performed by: STUDENT IN AN ORGANIZED HEALTH CARE EDUCATION/TRAINING PROGRAM

## 2025-02-18 PROCEDURE — 6370000000 HC RX 637 (ALT 250 FOR IP): Performed by: INTERNAL MEDICINE

## 2025-02-18 PROCEDURE — 2500000003 HC RX 250 WO HCPCS: Performed by: NURSE PRACTITIONER

## 2025-02-18 PROCEDURE — 99233 SBSQ HOSP IP/OBS HIGH 50: CPT

## 2025-02-18 PROCEDURE — 84132 ASSAY OF SERUM POTASSIUM: CPT

## 2025-02-18 PROCEDURE — 80076 HEPATIC FUNCTION PANEL: CPT

## 2025-02-18 PROCEDURE — 6360000002 HC RX W HCPCS

## 2025-02-18 PROCEDURE — 6370000000 HC RX 637 (ALT 250 FOR IP): Performed by: NURSE PRACTITIONER

## 2025-02-18 PROCEDURE — 2000000000 HC ICU R&B

## 2025-02-18 PROCEDURE — 2580000003 HC RX 258: Performed by: INTERNAL MEDICINE

## 2025-02-18 PROCEDURE — 92610 EVALUATE SWALLOWING FUNCTION: CPT

## 2025-02-18 PROCEDURE — 6360000002 HC RX W HCPCS: Performed by: STUDENT IN AN ORGANIZED HEALTH CARE EDUCATION/TRAINING PROGRAM

## 2025-02-18 PROCEDURE — 6360000002 HC RX W HCPCS: Performed by: NURSE PRACTITIONER

## 2025-02-18 PROCEDURE — 85379 FIBRIN DEGRADATION QUANT: CPT

## 2025-02-18 PROCEDURE — 86140 C-REACTIVE PROTEIN: CPT

## 2025-02-18 PROCEDURE — 80202 ASSAY OF VANCOMYCIN: CPT

## 2025-02-18 PROCEDURE — 2580000003 HC RX 258: Performed by: NURSE PRACTITIONER

## 2025-02-18 PROCEDURE — 83615 LACTATE (LD) (LDH) ENZYME: CPT

## 2025-02-18 PROCEDURE — 99223 1ST HOSP IP/OBS HIGH 75: CPT | Performed by: INTERNAL MEDICINE

## 2025-02-18 PROCEDURE — 97530 THERAPEUTIC ACTIVITIES: CPT

## 2025-02-18 PROCEDURE — 85025 COMPLETE CBC W/AUTO DIFF WBC: CPT

## 2025-02-18 PROCEDURE — 94761 N-INVAS EAR/PLS OXIMETRY MLT: CPT

## 2025-02-18 RX ORDER — POTASSIUM CHLORIDE 7.45 MG/ML
10 INJECTION INTRAVENOUS PRN
Status: DISCONTINUED | OUTPATIENT
Start: 2025-02-18 | End: 2025-02-27 | Stop reason: HOSPADM

## 2025-02-18 RX ADMIN — POTASSIUM CHLORIDE 10 MEQ: 7.46 INJECTION, SOLUTION INTRAVENOUS at 05:51

## 2025-02-18 RX ADMIN — VANCOMYCIN HYDROCHLORIDE 1000 MG: 1 INJECTION, POWDER, LYOPHILIZED, FOR SOLUTION INTRAVENOUS at 19:28

## 2025-02-18 RX ADMIN — VANCOMYCIN HYDROCHLORIDE 1000 MG: 1 INJECTION, POWDER, LYOPHILIZED, FOR SOLUTION INTRAVENOUS at 06:40

## 2025-02-18 RX ADMIN — AZTREONAM 2000 MG: 2 INJECTION, POWDER, LYOPHILIZED, FOR SOLUTION INTRAMUSCULAR; INTRAVENOUS at 08:39

## 2025-02-18 RX ADMIN — POTASSIUM CHLORIDE 10 MEQ: 7.46 INJECTION, SOLUTION INTRAVENOUS at 08:40

## 2025-02-18 RX ADMIN — CLINDAMYCIN IN 5 PERCENT DEXTROSE 600 MG: 12 INJECTION, SOLUTION INTRAVENOUS at 03:34

## 2025-02-18 RX ADMIN — SODIUM CHLORIDE, PRESERVATIVE FREE 10 ML: 5 INJECTION INTRAVENOUS at 19:52

## 2025-02-18 RX ADMIN — CEFEPIME 2000 MG: 2 INJECTION, POWDER, FOR SOLUTION INTRAVENOUS at 19:19

## 2025-02-18 RX ADMIN — DEXAMETHASONE SODIUM PHOSPHATE 6 MG: 10 INJECTION INTRAMUSCULAR; INTRAVENOUS at 05:46

## 2025-02-18 RX ADMIN — DONEPEZIL HYDROCHLORIDE 10 MG: 10 TABLET, FILM COATED ORAL at 19:52

## 2025-02-18 RX ADMIN — ASPIRIN 81 MG: 81 TABLET, COATED ORAL at 11:03

## 2025-02-18 RX ADMIN — ENOXAPARIN SODIUM 40 MG: 100 INJECTION SUBCUTANEOUS at 11:02

## 2025-02-18 RX ADMIN — CLINDAMYCIN IN 5 PERCENT DEXTROSE 600 MG: 12 INJECTION, SOLUTION INTRAVENOUS at 13:40

## 2025-02-18 RX ADMIN — AMLODIPINE BESYLATE 10 MG: 10 TABLET ORAL at 11:02

## 2025-02-18 RX ADMIN — AZTREONAM 2000 MG: 2 INJECTION, POWDER, LYOPHILIZED, FOR SOLUTION INTRAMUSCULAR; INTRAVENOUS at 00:27

## 2025-02-18 RX ADMIN — POTASSIUM CHLORIDE 10 MEQ: 7.46 INJECTION, SOLUTION INTRAVENOUS at 10:43

## 2025-02-18 RX ADMIN — LISINOPRIL 40 MG: 20 TABLET ORAL at 11:03

## 2025-02-18 RX ADMIN — MICONAZOLE NITRATE: 20 POWDER TOPICAL at 11:03

## 2025-02-18 RX ADMIN — ATORVASTATIN CALCIUM 80 MG: 80 TABLET, FILM COATED ORAL at 19:52

## 2025-02-18 RX ADMIN — SODIUM CHLORIDE: 9 INJECTION, SOLUTION INTRAVENOUS at 00:20

## 2025-02-18 RX ADMIN — BARICITINIB 4 MG: 2 TABLET, FILM COATED ORAL at 11:02

## 2025-02-18 RX ADMIN — MICONAZOLE NITRATE: 20 POWDER TOPICAL at 19:52

## 2025-02-18 RX ADMIN — POTASSIUM CHLORIDE 10 MEQ: 7.46 INJECTION, SOLUTION INTRAVENOUS at 06:57

## 2025-02-18 NOTE — CONSULTS
[facility] -   Infectious diseases evaluation  admission date 2/17/2025    reason for consultation:   COVID-19 infection    Impression :   Current:  COVID-19 with possible superimposed bacterial infection   Multifocal pneumonia/witnessed aspiration on 2/17/2025  Acute hypoxic respiratory failure on high flow oxygen  1 of 2 positive blood culture for coagulase-negative staph likely contamination  Positive MRSA swab  Recent fall, intraparenchymal hemorrhage, no intervention by neurosurgery  Brain aneurysm status post IR carotid cerebral angiogram 1/9/2025  Penicillin allergy with anaphylaxis/the patient tolerated cephalosporins in the past    HENCE:   Continue IV vancomycin   Discontinue aztreonam and clindamycin  IV cefepime  Respiratory culture  Barcitinib  Decadron  Follow C-reactive protein, CBC and renal function  Supportive care    Infection Control Recommendations   [place] Precautions  Droplet plus isolation    Antimicrobial Stewardship Recommendations   Simplification of therapy  Targeted therapy      History of Present Illness:   Initial history:  Lanny Jaimes is a 69 y.o.-year-old female presented to hospital from [facility] for worsening cough associated with nasal congestion, generalized weakness and decreased appetite for 3 days.  The patient was hypoxic, was placed initially on 4 L of oxygen per nasal cannula.  The patient had witnessed aspiration while eating lunch on 2/17/2025, oxygen requirement increased and was placed on high flow oxygen.  She is currently on high flow oxygen per nasal cannula, complaining of cough and shortness of breath, denied nausea or vomiting, no fever or chills, no other complaints.  COVID-19 test was positive.  Initial WBC 12.4, C-reactive protein 54.1, procalcitonin 0.17  1 of 2 blood culture grew staph epi  MRSA swab was positive  CT chest showed no PE, did show multifocal infiltrate more on the right side  The patient was

## 2025-02-18 NOTE — FLOWSHEET NOTE
02/17/25 2327   Oxygen Therapy   SpO2 (!) (S)  79 %     Patient on 15L salter satting between 77% to 86%. HOB completely raised and patient instructed to take deep breathes through nose.

## 2025-02-18 NOTE — CARE COORDINATION
ONGOING DISCHARGE PLAN:    Patient is alert to self.     Spoke with patient's son, Sha over the phone regarding discharge plan.    He states [facility] does not have a LTC Medicaid bed. He filed for Medicaid and is looking into a facility in [place].    Writer explained patient meets LTACH criteria. His preference after discussion [facility] in [place]. Referral sent and Liz notified.     +COVID  70%/30L    ID consult  IV decadron  Oral olumiant    IV azactam/vanco/clindamycin    PT/OT  SLP  Barium swallow      Will continue to follow for additional discharge needs.    If patient is discharged prior to next notation, then this note serves as note for discharge by case management.    Electronically signed by Laura Ramos RN on 2/18/2025 at 3:30 PM

## 2025-02-18 NOTE — FLOWSHEET NOTE
02/17/25 2331   Treatment Team Notification   Reason for Communication Abnormal vitals   Name of Team Member Notified Dr. Carter   Treatment Team Role Consulting Provider   Method of Communication Secure Message   Response Waiting for response  (Message unread)   Notification Time 4799     2335 Message read, new order received per Dr. Carter to begin heated high flow 30L at Fio2 50%.     Woody ABDUL notified.

## 2025-02-19 PROBLEM — R79.82 CRP ELEVATED: Status: ACTIVE | Noted: 2025-02-19

## 2025-02-19 PROBLEM — D72.825 BANDEMIA: Status: ACTIVE | Noted: 2025-02-19

## 2025-02-19 LAB
ANION GAP SERPL CALCULATED.3IONS-SCNC: 11 MMOL/L (ref 9–16)
BASOPHILS # BLD: 0 K/UL (ref 0–0.2)
BASOPHILS NFR BLD: 0 % (ref 0–2)
BUN SERPL-MCNC: 22 MG/DL (ref 8–23)
CALCIUM SERPL-MCNC: 8.5 MG/DL (ref 8.6–10.4)
CHLORIDE SERPL-SCNC: 112 MMOL/L (ref 98–107)
CO2 SERPL-SCNC: 24 MMOL/L (ref 20–31)
CREAT SERPL-MCNC: 0.6 MG/DL (ref 0.7–1.2)
EOSINOPHIL # BLD: 0 K/UL (ref 0–0.4)
EOSINOPHILS RELATIVE PERCENT: 0 % (ref 0–4)
ERYTHROCYTE [DISTWIDTH] IN BLOOD BY AUTOMATED COUNT: 15.7 % (ref 11.5–14.9)
GFR, ESTIMATED: >90 ML/MIN/1.73M2
GLUCOSE SERPL-MCNC: 122 MG/DL (ref 74–99)
HCT VFR BLD AUTO: 29.7 % (ref 36–46)
HGB BLD-MCNC: 9.7 G/DL (ref 12–16)
LYMPHOCYTES NFR BLD: 1.59 K/UL (ref 1–4.8)
LYMPHOCYTES RELATIVE PERCENT: 10 % (ref 24–44)
MCH RBC QN AUTO: 30 PG (ref 26–34)
MCHC RBC AUTO-ENTMCNC: 32.7 G/DL (ref 31–37)
MCV RBC AUTO: 91.7 FL (ref 80–100)
MONOCYTES NFR BLD: 1.27 K/UL (ref 0.1–1.3)
MONOCYTES NFR BLD: 8 % (ref 1–7)
MORPHOLOGY: ABNORMAL
NEUTROPHILS NFR BLD: 82 % (ref 36–66)
NEUTS SEG NFR BLD: 13.04 K/UL (ref 1.3–9.1)
PLATELET # BLD AUTO: 395 K/UL (ref 150–450)
PMV BLD AUTO: 7.5 FL (ref 6–12)
POTASSIUM SERPL-SCNC: 3.7 MMOL/L (ref 3.7–5.3)
RBC # BLD AUTO: 3.24 M/UL (ref 4–5.2)
SODIUM SERPL-SCNC: 147 MMOL/L (ref 136–145)
WBC OTHER # BLD: 15.9 K/UL (ref 3.5–11)

## 2025-02-19 PROCEDURE — 2580000003 HC RX 258: Performed by: INTERNAL MEDICINE

## 2025-02-19 PROCEDURE — 36415 COLL VENOUS BLD VENIPUNCTURE: CPT

## 2025-02-19 PROCEDURE — XW033E5 INTRODUCTION OF REMDESIVIR ANTI-INFECTIVE INTO PERIPHERAL VEIN, PERCUTANEOUS APPROACH, NEW TECHNOLOGY GROUP 5: ICD-10-PCS | Performed by: INTERNAL MEDICINE

## 2025-02-19 PROCEDURE — 6360000002 HC RX W HCPCS: Performed by: INTERNAL MEDICINE

## 2025-02-19 PROCEDURE — 2000000000 HC ICU R&B

## 2025-02-19 PROCEDURE — 2500000003 HC RX 250 WO HCPCS: Performed by: NURSE PRACTITIONER

## 2025-02-19 PROCEDURE — 99233 SBSQ HOSP IP/OBS HIGH 50: CPT | Performed by: INTERNAL MEDICINE

## 2025-02-19 PROCEDURE — 80048 BASIC METABOLIC PNL TOTAL CA: CPT

## 2025-02-19 PROCEDURE — G0545 PR INHERENT VISIT TO INPT: HCPCS | Performed by: INTERNAL MEDICINE

## 2025-02-19 PROCEDURE — 6370000000 HC RX 637 (ALT 250 FOR IP): Performed by: NURSE PRACTITIONER

## 2025-02-19 PROCEDURE — 85025 COMPLETE CBC W/AUTO DIFF WBC: CPT

## 2025-02-19 PROCEDURE — 6360000002 HC RX W HCPCS: Performed by: NURSE PRACTITIONER

## 2025-02-19 PROCEDURE — 99233 SBSQ HOSP IP/OBS HIGH 50: CPT

## 2025-02-19 RX ORDER — LINEZOLID 2 MG/ML
600 INJECTION, SOLUTION INTRAVENOUS EVERY 12 HOURS
Status: DISCONTINUED | OUTPATIENT
Start: 2025-02-19 | End: 2025-02-25

## 2025-02-19 RX ORDER — 0.9 % SODIUM CHLORIDE 0.9 %
30 INTRAVENOUS SOLUTION INTRAVENOUS PRN
Status: DISCONTINUED | OUTPATIENT
Start: 2025-02-19 | End: 2025-02-27 | Stop reason: HOSPADM

## 2025-02-19 RX ADMIN — LEVOTHYROXINE SODIUM 125 MCG: 0.12 TABLET ORAL at 05:27

## 2025-02-19 RX ADMIN — Medication 3 MG: at 20:36

## 2025-02-19 RX ADMIN — MICONAZOLE NITRATE: 20 POWDER TOPICAL at 07:35

## 2025-02-19 RX ADMIN — MICONAZOLE NITRATE: 20 POWDER TOPICAL at 20:36

## 2025-02-19 RX ADMIN — CEFEPIME 2000 MG: 2 INJECTION, POWDER, FOR SOLUTION INTRAVENOUS at 09:49

## 2025-02-19 RX ADMIN — CARVEDILOL 6.25 MG: 6.25 TABLET, FILM COATED ORAL at 17:05

## 2025-02-19 RX ADMIN — SODIUM CHLORIDE 30 ML: 0.9 INJECTION, SOLUTION INTRAVENOUS at 01:49

## 2025-02-19 RX ADMIN — REMDESIVIR 200 MG: 100 INJECTION, POWDER, LYOPHILIZED, FOR SOLUTION INTRAVENOUS at 00:44

## 2025-02-19 RX ADMIN — LISINOPRIL 40 MG: 20 TABLET ORAL at 07:34

## 2025-02-19 RX ADMIN — AMLODIPINE BESYLATE 10 MG: 10 TABLET ORAL at 07:34

## 2025-02-19 RX ADMIN — DONEPEZIL HYDROCHLORIDE 10 MG: 10 TABLET, FILM COATED ORAL at 20:36

## 2025-02-19 RX ADMIN — ATORVASTATIN CALCIUM 80 MG: 80 TABLET, FILM COATED ORAL at 20:35

## 2025-02-19 RX ADMIN — ASPIRIN 81 MG: 81 TABLET, COATED ORAL at 07:34

## 2025-02-19 RX ADMIN — LINEZOLID 600 MG: 600 INJECTION, SOLUTION INTRAVENOUS at 00:25

## 2025-02-19 RX ADMIN — POLYETHYLENE GLYCOL 3350 17 G: 17 POWDER, FOR SOLUTION ORAL at 07:34

## 2025-02-19 RX ADMIN — LINEZOLID 600 MG: 600 INJECTION, SOLUTION INTRAVENOUS at 12:56

## 2025-02-19 RX ADMIN — SODIUM CHLORIDE, PRESERVATIVE FREE 10 ML: 5 INJECTION INTRAVENOUS at 20:36

## 2025-02-19 RX ADMIN — CEFEPIME 2000 MG: 2 INJECTION, POWDER, FOR SOLUTION INTRAVENOUS at 01:57

## 2025-02-19 RX ADMIN — ENOXAPARIN SODIUM 40 MG: 100 INJECTION SUBCUTANEOUS at 07:34

## 2025-02-19 RX ADMIN — CEFEPIME 2000 MG: 2 INJECTION, POWDER, FOR SOLUTION INTRAVENOUS at 18:03

## 2025-02-19 RX ADMIN — CARVEDILOL 6.25 MG: 6.25 TABLET, FILM COATED ORAL at 07:34

## 2025-02-19 ASSESSMENT — PAIN SCALES - GENERAL: PAINLEVEL_OUTOF10: 0

## 2025-02-19 NOTE — CARE COORDINATION
DISCHARGE PLANNING NOTE:    LSW following for potential discharge to LTAC. Patient accepted at [facility]. Patient needs 3 days in ICU to qualify. Insurance authorization to be submitted tomorrow 2/20.

## 2025-02-20 ENCOUNTER — APPOINTMENT (OUTPATIENT)
Dept: GENERAL RADIOLOGY | Age: 70
DRG: 177 | End: 2025-02-20
Payer: MEDICARE

## 2025-02-20 PROBLEM — R13.11 ORAL PHASE DYSPHAGIA: Status: ACTIVE | Noted: 2025-02-20

## 2025-02-20 LAB
ALBUMIN SERPL-MCNC: 2.9 G/DL (ref 3.5–5.2)
ALP SERPL-CCNC: 76 U/L (ref 35–104)
ALT SERPL-CCNC: 9 U/L (ref 10–35)
ANION GAP SERPL CALCULATED.3IONS-SCNC: 8 MMOL/L (ref 9–16)
AST SERPL-CCNC: 16 U/L (ref 10–35)
BASOPHILS # BLD: 0.15 K/UL (ref 0–0.2)
BASOPHILS NFR BLD: 1 % (ref 0–2)
BILIRUB DIRECT SERPL-MCNC: 0.1 MG/DL (ref 0–0.3)
BILIRUB INDIRECT SERPL-MCNC: ABNORMAL MG/DL (ref 0–1)
BILIRUB SERPL-MCNC: <0.2 MG/DL (ref 0–1.2)
BUN SERPL-MCNC: 17 MG/DL (ref 8–23)
CALCIUM SERPL-MCNC: 8 MG/DL (ref 8.6–10.4)
CHLORIDE SERPL-SCNC: 105 MMOL/L (ref 98–107)
CO2 SERPL-SCNC: 29 MMOL/L (ref 20–31)
CREAT SERPL-MCNC: 0.5 MG/DL (ref 0.7–1.2)
CRP SERPL HS-MCNC: 69 MG/L (ref 0–5)
EOSINOPHIL # BLD: 0 K/UL (ref 0–0.4)
EOSINOPHILS RELATIVE PERCENT: 0 % (ref 0–4)
ERYTHROCYTE [DISTWIDTH] IN BLOOD BY AUTOMATED COUNT: 15.7 % (ref 11.5–14.9)
FERRITIN SERPL-MCNC: 27 NG/ML
GFR, ESTIMATED: >90 ML/MIN/1.73M2
GLUCOSE SERPL-MCNC: 112 MG/DL (ref 74–99)
HCT VFR BLD AUTO: 33.6 % (ref 36–46)
HGB BLD-MCNC: 10.5 G/DL (ref 12–16)
IRON SATN MFR SERPL: 4 % (ref 20–55)
IRON SERPL-MCNC: 17 UG/DL (ref 37–145)
LYMPHOCYTES NFR BLD: 1.63 K/UL (ref 1–4.8)
LYMPHOCYTES RELATIVE PERCENT: 11 % (ref 24–44)
MAGNESIUM SERPL-MCNC: 2.1 MG/DL (ref 1.6–2.4)
MCH RBC QN AUTO: 28.3 PG (ref 26–34)
MCHC RBC AUTO-ENTMCNC: 31.4 G/DL (ref 31–37)
MCV RBC AUTO: 90.2 FL (ref 80–100)
MONOCYTES NFR BLD: 1.18 K/UL (ref 0.1–1.3)
MONOCYTES NFR BLD: 8 % (ref 1–7)
MORPHOLOGY: ABNORMAL
MORPHOLOGY: ABNORMAL
NEUTROPHILS NFR BLD: 80 % (ref 36–66)
NEUTS SEG NFR BLD: 11.84 K/UL (ref 1.3–9.1)
PLATELET # BLD AUTO: 409 K/UL (ref 150–450)
PMV BLD AUTO: 7.1 FL (ref 6–12)
POTASSIUM SERPL-SCNC: 3.3 MMOL/L (ref 3.7–5.3)
PROT SERPL-MCNC: 6.3 G/DL (ref 6.6–8.7)
RBC # BLD AUTO: 3.72 M/UL (ref 4–5.2)
SODIUM SERPL-SCNC: 142 MMOL/L (ref 136–145)
TIBC SERPL-MCNC: 412 UG/DL (ref 250–450)
UNSATURATED IRON BINDING CAPACITY: 395 UG/DL (ref 112–347)
WBC OTHER # BLD: 14.8 K/UL (ref 3.5–11)

## 2025-02-20 PROCEDURE — 2580000003 HC RX 258: Performed by: INTERNAL MEDICINE

## 2025-02-20 PROCEDURE — 99233 SBSQ HOSP IP/OBS HIGH 50: CPT | Performed by: INTERNAL MEDICINE

## 2025-02-20 PROCEDURE — 97535 SELF CARE MNGMENT TRAINING: CPT

## 2025-02-20 PROCEDURE — 99233 SBSQ HOSP IP/OBS HIGH 50: CPT

## 2025-02-20 PROCEDURE — 36415 COLL VENOUS BLD VENIPUNCTURE: CPT

## 2025-02-20 PROCEDURE — 97530 THERAPEUTIC ACTIVITIES: CPT

## 2025-02-20 PROCEDURE — 83540 ASSAY OF IRON: CPT

## 2025-02-20 PROCEDURE — 2500000003 HC RX 250 WO HCPCS: Performed by: NURSE PRACTITIONER

## 2025-02-20 PROCEDURE — 6360000002 HC RX W HCPCS: Performed by: INTERNAL MEDICINE

## 2025-02-20 PROCEDURE — 94761 N-INVAS EAR/PLS OXIMETRY MLT: CPT

## 2025-02-20 PROCEDURE — 80048 BASIC METABOLIC PNL TOTAL CA: CPT

## 2025-02-20 PROCEDURE — 74230 X-RAY XM SWLNG FUNCJ C+: CPT

## 2025-02-20 PROCEDURE — 2060000000 HC ICU INTERMEDIATE R&B

## 2025-02-20 PROCEDURE — 80076 HEPATIC FUNCTION PANEL: CPT

## 2025-02-20 PROCEDURE — 92610 EVALUATE SWALLOWING FUNCTION: CPT

## 2025-02-20 PROCEDURE — 86140 C-REACTIVE PROTEIN: CPT

## 2025-02-20 PROCEDURE — 83550 IRON BINDING TEST: CPT

## 2025-02-20 PROCEDURE — 6370000000 HC RX 637 (ALT 250 FOR IP): Performed by: NURSE PRACTITIONER

## 2025-02-20 PROCEDURE — 85025 COMPLETE CBC W/AUTO DIFF WBC: CPT

## 2025-02-20 PROCEDURE — 6370000000 HC RX 637 (ALT 250 FOR IP)

## 2025-02-20 PROCEDURE — 2700000000 HC OXYGEN THERAPY PER DAY

## 2025-02-20 PROCEDURE — 6360000002 HC RX W HCPCS: Performed by: NURSE PRACTITIONER

## 2025-02-20 PROCEDURE — 71045 X-RAY EXAM CHEST 1 VIEW: CPT

## 2025-02-20 PROCEDURE — G0545 PR INHERENT VISIT TO INPT: HCPCS | Performed by: INTERNAL MEDICINE

## 2025-02-20 PROCEDURE — 83735 ASSAY OF MAGNESIUM: CPT

## 2025-02-20 PROCEDURE — 82728 ASSAY OF FERRITIN: CPT

## 2025-02-20 PROCEDURE — 97162 PT EVAL MOD COMPLEX 30 MIN: CPT

## 2025-02-20 RX ORDER — CARVEDILOL 12.5 MG/1
12.5 TABLET ORAL 2 TIMES DAILY WITH MEALS
Status: DISCONTINUED | OUTPATIENT
Start: 2025-02-20 | End: 2025-02-27 | Stop reason: HOSPADM

## 2025-02-20 RX ADMIN — CEFEPIME 2000 MG: 2 INJECTION, POWDER, FOR SOLUTION INTRAVENOUS at 10:13

## 2025-02-20 RX ADMIN — CARVEDILOL 12.5 MG: 12.5 TABLET, FILM COATED ORAL at 17:43

## 2025-02-20 RX ADMIN — CEFEPIME 2000 MG: 2 INJECTION, POWDER, FOR SOLUTION INTRAVENOUS at 02:11

## 2025-02-20 RX ADMIN — LEVOTHYROXINE SODIUM 125 MCG: 0.12 TABLET ORAL at 05:48

## 2025-02-20 RX ADMIN — DONEPEZIL HYDROCHLORIDE 10 MG: 10 TABLET, FILM COATED ORAL at 21:28

## 2025-02-20 RX ADMIN — MICONAZOLE NITRATE: 20 POWDER TOPICAL at 08:47

## 2025-02-20 RX ADMIN — CARVEDILOL 6.25 MG: 6.25 TABLET, FILM COATED ORAL at 08:46

## 2025-02-20 RX ADMIN — SODIUM CHLORIDE, PRESERVATIVE FREE 10 ML: 5 INJECTION INTRAVENOUS at 21:28

## 2025-02-20 RX ADMIN — LINEZOLID 600 MG: 600 INJECTION, SOLUTION INTRAVENOUS at 00:44

## 2025-02-20 RX ADMIN — ENOXAPARIN SODIUM 40 MG: 100 INJECTION SUBCUTANEOUS at 08:47

## 2025-02-20 RX ADMIN — LISINOPRIL 40 MG: 20 TABLET ORAL at 08:47

## 2025-02-20 RX ADMIN — REMDESIVIR 100 MG: 100 INJECTION, POWDER, LYOPHILIZED, FOR SOLUTION INTRAVENOUS at 00:02

## 2025-02-20 RX ADMIN — AMLODIPINE BESYLATE 10 MG: 10 TABLET ORAL at 08:47

## 2025-02-20 RX ADMIN — Medication 3 MG: at 21:28

## 2025-02-20 RX ADMIN — ATORVASTATIN CALCIUM 80 MG: 80 TABLET, FILM COATED ORAL at 21:28

## 2025-02-20 RX ADMIN — CEFEPIME 2000 MG: 2 INJECTION, POWDER, FOR SOLUTION INTRAVENOUS at 17:49

## 2025-02-20 RX ADMIN — LINEZOLID 600 MG: 600 INJECTION, SOLUTION INTRAVENOUS at 14:54

## 2025-02-20 RX ADMIN — ASPIRIN 81 MG: 81 TABLET, COATED ORAL at 08:47

## 2025-02-20 RX ADMIN — MICONAZOLE NITRATE: 20 POWDER TOPICAL at 21:28

## 2025-02-20 RX ADMIN — POLYETHYLENE GLYCOL 3350 17 G: 17 POWDER, FOR SOLUTION ORAL at 08:47

## 2025-02-20 RX ADMIN — POTASSIUM CHLORIDE 40 MEQ: 1500 TABLET, EXTENDED RELEASE ORAL at 05:48

## 2025-02-20 NOTE — PROCEDURES
INSTRUMENTAL SWALLOW REPORT  MODIFIED BARIUM SWALLOW    NAME: Lanny Jaimes   : 1955  MRN: [id number]       Date of Eval: 2025              Referring Diagnosis(es):  dysphagia    Past Medical History:  has a past medical history of Thyroid disease.  Past Surgical History:  has no past surgical history on file.               Type of Study: Initial MBS       Recent CXR/CT of Chest: Date 25- CXR- Similar-appearing patchy bilateral airspace opacities, right greater than  left.  Findings may represent underlying multifocal pneumonia.    Patient Complaints/Reason for Referral:  Lanny Jaimes was referred for a MBS to assess the efficiency of his/her swallow function, assess for aspiration, and to make recommendations regarding safe dietary consistencies, effective compensatory strategies, and safe eating environment.       Onset of problem:      Per IM H&P: Lanny Jaimes is a 69 y.o. Non- / non  female who presents with Cough and Cold Symptoms (Hypoxic at the longterm home, has been febrile and coughing. )   and is admitted to the hospital for the management of Multifocal pneumonia.     Patient's medical history significant for cerebral amyloid angiopathy, frequent falls, major neurocognitive disorder, HTN, and acquired hypothyroidism.     Patient has dementia at baseline and is unable to provide any input into HPI, other than stating she has not been feeling well.  According to ED provider, patient presented from ECF per EMS after she was noted to be not acting her usual self.  She was noted to be hypoxic with SpO2 in the 80s.  EMS applied nonrebreather and transported to ED. review of epic indicates the patient was recently discharged from ARU after receiving rehabilitative services for CVA.     CXR obtained in ED shows new patchy opacities in the right mid to lower lung more than the left lower lung.  Features could represent pneumonia in the correct clinical setting. Borderline size of the

## 2025-02-20 NOTE — CARE COORDINATION
ONGOING DISCHARGE PLAN:    Patient is alert to self.     [facility] started auth today. If denied, then son would like [facility]. He is working on a LTC bed at [facility].     +COVID  94% on 1l    ID consult  IV cefepime/zyvox    PT/OT  SLP  Barium swallow-easy to chew, mild thick      Will continue to follow for additional discharge needs.    If patient is discharged prior to next notation, then this note serves as note for discharge by case management.    Electronically signed by Laura Ramos RN on 2/20/2025 at 4:15 PM

## 2025-02-21 LAB
ALBUMIN SERPL-MCNC: 2.7 G/DL (ref 3.5–5.2)
ALP SERPL-CCNC: 71 U/L (ref 35–104)
ALT SERPL-CCNC: 10 U/L (ref 10–35)
ANION GAP SERPL CALCULATED.3IONS-SCNC: 9 MMOL/L (ref 9–16)
AST SERPL-CCNC: 15 U/L (ref 10–35)
BILIRUB DIRECT SERPL-MCNC: 0.1 MG/DL (ref 0–0.3)
BILIRUB INDIRECT SERPL-MCNC: 0.1 MG/DL (ref 0–1)
BILIRUB SERPL-MCNC: 0.2 MG/DL (ref 0–1.2)
BUN SERPL-MCNC: 14 MG/DL (ref 8–23)
CALCIUM SERPL-MCNC: 7.9 MG/DL (ref 8.6–10.4)
CHLORIDE SERPL-SCNC: 103 MMOL/L (ref 98–107)
CO2 SERPL-SCNC: 27 MMOL/L (ref 20–31)
CREAT SERPL-MCNC: 0.5 MG/DL (ref 0.7–1.2)
GFR, ESTIMATED: >90 ML/MIN/1.73M2
GLUCOSE SERPL-MCNC: 88 MG/DL (ref 74–99)
POTASSIUM SERPL-SCNC: 3.4 MMOL/L (ref 3.7–5.3)
PROT SERPL-MCNC: 5.3 G/DL (ref 6.6–8.7)
SODIUM SERPL-SCNC: 139 MMOL/L (ref 136–145)

## 2025-02-21 PROCEDURE — 2060000000 HC ICU INTERMEDIATE R&B

## 2025-02-21 PROCEDURE — 82607 VITAMIN B-12: CPT

## 2025-02-21 PROCEDURE — 80048 BASIC METABOLIC PNL TOTAL CA: CPT

## 2025-02-21 PROCEDURE — 99233 SBSQ HOSP IP/OBS HIGH 50: CPT

## 2025-02-21 PROCEDURE — 97530 THERAPEUTIC ACTIVITIES: CPT

## 2025-02-21 PROCEDURE — 6360000002 HC RX W HCPCS: Performed by: INTERNAL MEDICINE

## 2025-02-21 PROCEDURE — 80076 HEPATIC FUNCTION PANEL: CPT

## 2025-02-21 PROCEDURE — 6370000000 HC RX 637 (ALT 250 FOR IP)

## 2025-02-21 PROCEDURE — 6360000002 HC RX W HCPCS: Performed by: NURSE PRACTITIONER

## 2025-02-21 PROCEDURE — 6370000000 HC RX 637 (ALT 250 FOR IP): Performed by: NURSE PRACTITIONER

## 2025-02-21 PROCEDURE — 82746 ASSAY OF FOLIC ACID SERUM: CPT

## 2025-02-21 PROCEDURE — 2580000003 HC RX 258: Performed by: INTERNAL MEDICINE

## 2025-02-21 PROCEDURE — 2500000003 HC RX 250 WO HCPCS: Performed by: NURSE PRACTITIONER

## 2025-02-21 PROCEDURE — 97110 THERAPEUTIC EXERCISES: CPT

## 2025-02-21 PROCEDURE — 36415 COLL VENOUS BLD VENIPUNCTURE: CPT

## 2025-02-21 PROCEDURE — 92526 ORAL FUNCTION THERAPY: CPT

## 2025-02-21 RX ORDER — FERROUS SULFATE 325(65) MG
325 TABLET ORAL 2 TIMES DAILY WITH MEALS
Status: DISCONTINUED | OUTPATIENT
Start: 2025-02-21 | End: 2025-02-27 | Stop reason: HOSPADM

## 2025-02-21 RX ADMIN — ASPIRIN 81 MG: 81 TABLET, COATED ORAL at 09:46

## 2025-02-21 RX ADMIN — FERROUS SULFATE TAB 325 MG (65 MG ELEMENTAL FE) 325 MG: 325 (65 FE) TAB at 18:02

## 2025-02-21 RX ADMIN — ATORVASTATIN CALCIUM 80 MG: 80 TABLET, FILM COATED ORAL at 20:07

## 2025-02-21 RX ADMIN — MICONAZOLE NITRATE: 20 POWDER TOPICAL at 20:07

## 2025-02-21 RX ADMIN — CEFEPIME 2000 MG: 2 INJECTION, POWDER, FOR SOLUTION INTRAVENOUS at 09:48

## 2025-02-21 RX ADMIN — AMLODIPINE BESYLATE 10 MG: 10 TABLET ORAL at 09:46

## 2025-02-21 RX ADMIN — LEVOTHYROXINE SODIUM 125 MCG: 0.12 TABLET ORAL at 06:32

## 2025-02-21 RX ADMIN — POLYETHYLENE GLYCOL 3350 17 G: 17 POWDER, FOR SOLUTION ORAL at 09:46

## 2025-02-21 RX ADMIN — CEFEPIME 2000 MG: 2 INJECTION, POWDER, FOR SOLUTION INTRAVENOUS at 18:35

## 2025-02-21 RX ADMIN — CARVEDILOL 12.5 MG: 12.5 TABLET, FILM COATED ORAL at 09:46

## 2025-02-21 RX ADMIN — POTASSIUM CHLORIDE 40 MEQ: 1500 TABLET, EXTENDED RELEASE ORAL at 18:34

## 2025-02-21 RX ADMIN — ENOXAPARIN SODIUM 40 MG: 100 INJECTION SUBCUTANEOUS at 09:47

## 2025-02-21 RX ADMIN — CEFEPIME 2000 MG: 2 INJECTION, POWDER, FOR SOLUTION INTRAVENOUS at 02:17

## 2025-02-21 RX ADMIN — SODIUM CHLORIDE, PRESERVATIVE FREE 10 ML: 5 INJECTION INTRAVENOUS at 09:47

## 2025-02-21 RX ADMIN — REMDESIVIR 100 MG: 100 INJECTION, POWDER, LYOPHILIZED, FOR SOLUTION INTRAVENOUS at 00:34

## 2025-02-21 RX ADMIN — MICONAZOLE NITRATE: 20 POWDER TOPICAL at 09:47

## 2025-02-21 RX ADMIN — LISINOPRIL 40 MG: 20 TABLET ORAL at 09:46

## 2025-02-21 RX ADMIN — LINEZOLID 600 MG: 600 INJECTION, SOLUTION INTRAVENOUS at 01:13

## 2025-02-21 RX ADMIN — CARVEDILOL 12.5 MG: 12.5 TABLET, FILM COATED ORAL at 18:11

## 2025-02-21 RX ADMIN — DONEPEZIL HYDROCHLORIDE 10 MG: 10 TABLET, FILM COATED ORAL at 20:07

## 2025-02-21 RX ADMIN — LINEZOLID 600 MG: 600 INJECTION, SOLUTION INTRAVENOUS at 14:26

## 2025-02-21 ASSESSMENT — PAIN SCALES - GENERAL
PAINLEVEL_OUTOF10: 0
PAINLEVEL_OUTOF10: 0

## 2025-02-21 NOTE — CARE COORDINATION
ONGOING DISCHARGE  NOTE:      Writer reviewed notes, Patient is agreeable to discharge to [facility]   [address]  P:[phone]      Discharge is pending pre cert.  Pre cert was started on 2/20.    ATB per ID     Remdesivir    Oxygen      Will continue to follow for additional discharge needs.     If patient is discharged prior to next notation, then this note serves as note for discharge by case management.    Electronically signed by Tesha Maldonado on 2/21/2025 at 12:38 PM

## 2025-02-22 LAB
ALBUMIN SERPL-MCNC: 2.6 G/DL (ref 3.5–5.2)
ALP SERPL-CCNC: 73 U/L (ref 35–104)
ALT SERPL-CCNC: 11 U/L (ref 10–35)
ANION GAP SERPL CALCULATED.3IONS-SCNC: 10 MMOL/L (ref 9–16)
AST SERPL-CCNC: 18 U/L (ref 10–35)
BASOPHILS # BLD: 0 K/UL (ref 0–0.2)
BASOPHILS NFR BLD: 0 % (ref 0–2)
BILIRUB DIRECT SERPL-MCNC: 0.2 MG/DL (ref 0–0.3)
BILIRUB INDIRECT SERPL-MCNC: 0.1 MG/DL (ref 0–1)
BILIRUB SERPL-MCNC: 0.3 MG/DL (ref 0–1.2)
BUN SERPL-MCNC: 16 MG/DL (ref 8–23)
CALCIUM SERPL-MCNC: 7.7 MG/DL (ref 8.6–10.4)
CHLORIDE SERPL-SCNC: 105 MMOL/L (ref 98–107)
CO2 SERPL-SCNC: 25 MMOL/L (ref 20–31)
CREAT SERPL-MCNC: 0.5 MG/DL (ref 0.7–1.2)
EOSINOPHIL # BLD: 0.14 K/UL (ref 0–0.4)
EOSINOPHILS RELATIVE PERCENT: 1 % (ref 0–4)
ERYTHROCYTE [DISTWIDTH] IN BLOOD BY AUTOMATED COUNT: 15.5 % (ref 11.5–14.9)
GFR, ESTIMATED: >90 ML/MIN/1.73M2
GLUCOSE SERPL-MCNC: 103 MG/DL (ref 74–99)
HCT VFR BLD AUTO: 35 % (ref 36–46)
HGB BLD-MCNC: 11.5 G/DL (ref 12–16)
LYMPHOCYTES NFR BLD: 2.29 K/UL (ref 1–4.8)
LYMPHOCYTES RELATIVE PERCENT: 16 % (ref 24–44)
MCH RBC QN AUTO: 29.3 PG (ref 26–34)
MCHC RBC AUTO-ENTMCNC: 32.9 G/DL (ref 31–37)
MCV RBC AUTO: 89.1 FL (ref 80–100)
METAMYELOCYTES ABSOLUTE COUNT: 0.29 K/UL
METAMYELOCYTES: 2 %
MICROORGANISM SPEC CULT: NORMAL
MONOCYTES NFR BLD: 1.72 K/UL (ref 0.1–1.3)
MONOCYTES NFR BLD: 12 % (ref 1–7)
MORPHOLOGY: ABNORMAL
MORPHOLOGY: ABNORMAL
NEUTROPHILS NFR BLD: 69 % (ref 36–66)
NEUTS SEG NFR BLD: 9.86 K/UL (ref 1.3–9.1)
PLATELET # BLD AUTO: 370 K/UL (ref 150–450)
PMV BLD AUTO: 7.1 FL (ref 6–12)
POTASSIUM SERPL-SCNC: 3.9 MMOL/L (ref 3.7–5.3)
PROT SERPL-MCNC: 5.7 G/DL (ref 6.6–8.7)
RBC # BLD AUTO: 3.93 M/UL (ref 4–5.2)
SERVICE CMNT-IMP: NORMAL
SODIUM SERPL-SCNC: 140 MMOL/L (ref 136–145)
SPECIMEN DESCRIPTION: NORMAL
WBC OTHER # BLD: 14.3 K/UL (ref 3.5–11)

## 2025-02-22 PROCEDURE — 6370000000 HC RX 637 (ALT 250 FOR IP): Performed by: NURSE PRACTITIONER

## 2025-02-22 PROCEDURE — 6360000002 HC RX W HCPCS: Performed by: INTERNAL MEDICINE

## 2025-02-22 PROCEDURE — 80076 HEPATIC FUNCTION PANEL: CPT

## 2025-02-22 PROCEDURE — 85025 COMPLETE CBC W/AUTO DIFF WBC: CPT

## 2025-02-22 PROCEDURE — 2060000000 HC ICU INTERMEDIATE R&B

## 2025-02-22 PROCEDURE — 99232 SBSQ HOSP IP/OBS MODERATE 35: CPT | Performed by: NURSE PRACTITIONER

## 2025-02-22 PROCEDURE — 2580000003 HC RX 258: Performed by: INTERNAL MEDICINE

## 2025-02-22 PROCEDURE — 99232 SBSQ HOSP IP/OBS MODERATE 35: CPT

## 2025-02-22 PROCEDURE — 6370000000 HC RX 637 (ALT 250 FOR IP)

## 2025-02-22 PROCEDURE — 36415 COLL VENOUS BLD VENIPUNCTURE: CPT

## 2025-02-22 PROCEDURE — 6360000002 HC RX W HCPCS: Performed by: NURSE PRACTITIONER

## 2025-02-22 PROCEDURE — 80048 BASIC METABOLIC PNL TOTAL CA: CPT

## 2025-02-22 RX ORDER — BISMUTH SUBSALICYLATE 262 MG/1
524 TABLET, CHEWABLE ORAL 3 TIMES DAILY PRN
Status: DISCONTINUED | OUTPATIENT
Start: 2025-02-22 | End: 2025-02-27 | Stop reason: HOSPADM

## 2025-02-22 RX ORDER — CALCIUM CARBONATE 500 MG/1
500 TABLET, CHEWABLE ORAL 3 TIMES DAILY PRN
Status: DISCONTINUED | OUTPATIENT
Start: 2025-02-22 | End: 2025-02-27 | Stop reason: HOSPADM

## 2025-02-22 RX ADMIN — CEFEPIME 2000 MG: 2 INJECTION, POWDER, FOR SOLUTION INTRAVENOUS at 09:52

## 2025-02-22 RX ADMIN — CARVEDILOL 12.5 MG: 12.5 TABLET, FILM COATED ORAL at 09:41

## 2025-02-22 RX ADMIN — Medication 3 MG: at 23:30

## 2025-02-22 RX ADMIN — FERROUS SULFATE TAB 325 MG (65 MG ELEMENTAL FE) 325 MG: 325 (65 FE) TAB at 17:50

## 2025-02-22 RX ADMIN — AMLODIPINE BESYLATE 10 MG: 10 TABLET ORAL at 09:41

## 2025-02-22 RX ADMIN — CEFEPIME 2000 MG: 2 INJECTION, POWDER, FOR SOLUTION INTRAVENOUS at 17:52

## 2025-02-22 RX ADMIN — CEFEPIME 2000 MG: 2 INJECTION, POWDER, FOR SOLUTION INTRAVENOUS at 01:53

## 2025-02-22 RX ADMIN — ACETAMINOPHEN 650 MG: 325 TABLET ORAL at 11:52

## 2025-02-22 RX ADMIN — ASPIRIN 81 MG: 81 TABLET, COATED ORAL at 09:41

## 2025-02-22 RX ADMIN — LISINOPRIL 40 MG: 20 TABLET ORAL at 09:42

## 2025-02-22 RX ADMIN — LINEZOLID 600 MG: 600 INJECTION, SOLUTION INTRAVENOUS at 00:42

## 2025-02-22 RX ADMIN — LEVOTHYROXINE SODIUM 125 MCG: 0.12 TABLET ORAL at 05:35

## 2025-02-22 RX ADMIN — LINEZOLID 600 MG: 600 INJECTION, SOLUTION INTRAVENOUS at 14:33

## 2025-02-22 RX ADMIN — MICONAZOLE NITRATE: 20 POWDER TOPICAL at 23:31

## 2025-02-22 RX ADMIN — FERROUS SULFATE TAB 325 MG (65 MG ELEMENTAL FE) 325 MG: 325 (65 FE) TAB at 09:42

## 2025-02-22 RX ADMIN — ANTACID TABLETS 500 MG: 500 TABLET, CHEWABLE ORAL at 23:30

## 2025-02-22 RX ADMIN — MICONAZOLE NITRATE: 20 POWDER TOPICAL at 09:43

## 2025-02-22 RX ADMIN — ATORVASTATIN CALCIUM 80 MG: 80 TABLET, FILM COATED ORAL at 23:30

## 2025-02-22 RX ADMIN — ENOXAPARIN SODIUM 40 MG: 100 INJECTION SUBCUTANEOUS at 09:42

## 2025-02-22 RX ADMIN — CARVEDILOL 12.5 MG: 12.5 TABLET, FILM COATED ORAL at 17:50

## 2025-02-22 RX ADMIN — REMDESIVIR 100 MG: 100 INJECTION, POWDER, LYOPHILIZED, FOR SOLUTION INTRAVENOUS at 00:09

## 2025-02-22 RX ADMIN — BISMUTH SUBSALICYLATE 524 MG: 262 TABLET, CHEWABLE ORAL at 23:30

## 2025-02-22 RX ADMIN — POLYETHYLENE GLYCOL 3350 17 G: 17 POWDER, FOR SOLUTION ORAL at 09:43

## 2025-02-22 RX ADMIN — DONEPEZIL HYDROCHLORIDE 10 MG: 10 TABLET, FILM COATED ORAL at 23:30

## 2025-02-22 ASSESSMENT — PAIN SCALES - GENERAL: PAINLEVEL_OUTOF10: 3

## 2025-02-22 ASSESSMENT — PAIN DESCRIPTION - LOCATION: LOCATION: HEAD

## 2025-02-22 ASSESSMENT — PAIN DESCRIPTION - DESCRIPTORS: DESCRIPTORS: ACHING

## 2025-02-22 NOTE — DISCHARGE INSTR - DIET

## 2025-02-22 NOTE — CARE COORDINATION
DISCHARGE PLANNING NOTE:    LSW following for potential discharge to LTAC. Patient has been accepted at [facility], Insurance authorization submitted on 2/20. Message to Estefany in admissions to follow up on status of pre cert.

## 2025-02-23 PROBLEM — D72.829 LEUKOCYTOSIS: Status: ACTIVE | Noted: 2025-02-23

## 2025-02-23 LAB
ALBUMIN SERPL-MCNC: 2.4 G/DL (ref 3.5–5.2)
ALP SERPL-CCNC: 69 U/L (ref 35–104)
ALT SERPL-CCNC: 10 U/L (ref 10–35)
AST SERPL-CCNC: 16 U/L (ref 10–35)
BILIRUB DIRECT SERPL-MCNC: <0.1 MG/DL (ref 0–0.3)
BILIRUB INDIRECT SERPL-MCNC: ABNORMAL MG/DL (ref 0–1)
BILIRUB SERPL-MCNC: <0.2 MG/DL (ref 0–1.2)
CRP SERPL HS-MCNC: 49.6 MG/L (ref 0–5)
ERYTHROCYTE [DISTWIDTH] IN BLOOD BY AUTOMATED COUNT: 16 % (ref 11.5–14.9)
FOLATE SERPL-MCNC: 8.3 NG/ML (ref 4.8–24.2)
HCT VFR BLD AUTO: 33 % (ref 36–46)
HGB BLD-MCNC: 10.5 G/DL (ref 12–16)
MCH RBC QN AUTO: 28.8 PG (ref 26–34)
MCHC RBC AUTO-ENTMCNC: 31.8 G/DL (ref 31–37)
MCV RBC AUTO: 90.5 FL (ref 80–100)
MICROORGANISM SPEC CULT: NORMAL
PLATELET # BLD AUTO: 366 K/UL (ref 150–450)
PMV BLD AUTO: 7.3 FL (ref 6–12)
PROT SERPL-MCNC: 5.5 G/DL (ref 6.6–8.7)
RBC # BLD AUTO: 3.65 M/UL (ref 4–5.2)
SERVICE CMNT-IMP: NORMAL
SPECIMEN DESCRIPTION: NORMAL
VIT B12 SERPL-MCNC: 1295 PG/ML (ref 232–1245)
WBC OTHER # BLD: 14.7 K/UL (ref 3.5–11)

## 2025-02-23 PROCEDURE — 2580000003 HC RX 258: Performed by: INTERNAL MEDICINE

## 2025-02-23 PROCEDURE — 99232 SBSQ HOSP IP/OBS MODERATE 35: CPT

## 2025-02-23 PROCEDURE — 92526 ORAL FUNCTION THERAPY: CPT

## 2025-02-23 PROCEDURE — 36415 COLL VENOUS BLD VENIPUNCTURE: CPT

## 2025-02-23 PROCEDURE — 97112 NEUROMUSCULAR REEDUCATION: CPT

## 2025-02-23 PROCEDURE — 6360000002 HC RX W HCPCS: Performed by: INTERNAL MEDICINE

## 2025-02-23 PROCEDURE — 86140 C-REACTIVE PROTEIN: CPT

## 2025-02-23 PROCEDURE — 6370000000 HC RX 637 (ALT 250 FOR IP): Performed by: NURSE PRACTITIONER

## 2025-02-23 PROCEDURE — 2060000000 HC ICU INTERMEDIATE R&B

## 2025-02-23 PROCEDURE — 97530 THERAPEUTIC ACTIVITIES: CPT

## 2025-02-23 PROCEDURE — 80076 HEPATIC FUNCTION PANEL: CPT

## 2025-02-23 PROCEDURE — 6360000002 HC RX W HCPCS: Performed by: NURSE PRACTITIONER

## 2025-02-23 PROCEDURE — 97535 SELF CARE MNGMENT TRAINING: CPT

## 2025-02-23 PROCEDURE — 6370000000 HC RX 637 (ALT 250 FOR IP)

## 2025-02-23 PROCEDURE — 85027 COMPLETE CBC AUTOMATED: CPT

## 2025-02-23 PROCEDURE — 99232 SBSQ HOSP IP/OBS MODERATE 35: CPT | Performed by: NURSE PRACTITIONER

## 2025-02-23 RX ADMIN — REMDESIVIR 100 MG: 100 INJECTION, POWDER, LYOPHILIZED, FOR SOLUTION INTRAVENOUS at 00:49

## 2025-02-23 RX ADMIN — MICONAZOLE NITRATE: 20 POWDER TOPICAL at 10:33

## 2025-02-23 RX ADMIN — ENOXAPARIN SODIUM 40 MG: 100 INJECTION SUBCUTANEOUS at 10:29

## 2025-02-23 RX ADMIN — CEFEPIME 2000 MG: 2 INJECTION, POWDER, FOR SOLUTION INTRAVENOUS at 10:32

## 2025-02-23 RX ADMIN — CEFEPIME 2000 MG: 2 INJECTION, POWDER, FOR SOLUTION INTRAVENOUS at 17:56

## 2025-02-23 RX ADMIN — DONEPEZIL HYDROCHLORIDE 10 MG: 10 TABLET, FILM COATED ORAL at 21:17

## 2025-02-23 RX ADMIN — MICONAZOLE NITRATE: 20 POWDER TOPICAL at 21:17

## 2025-02-23 RX ADMIN — ACETAMINOPHEN 650 MG: 325 TABLET ORAL at 23:47

## 2025-02-23 RX ADMIN — BISMUTH SUBSALICYLATE 524 MG: 262 TABLET, CHEWABLE ORAL at 21:16

## 2025-02-23 RX ADMIN — LINEZOLID 600 MG: 600 INJECTION, SOLUTION INTRAVENOUS at 01:38

## 2025-02-23 RX ADMIN — ATORVASTATIN CALCIUM 80 MG: 80 TABLET, FILM COATED ORAL at 21:16

## 2025-02-23 RX ADMIN — LISINOPRIL 40 MG: 20 TABLET ORAL at 10:28

## 2025-02-23 RX ADMIN — LINEZOLID 600 MG: 600 INJECTION, SOLUTION INTRAVENOUS at 14:51

## 2025-02-23 RX ADMIN — ASPIRIN 81 MG: 81 TABLET, COATED ORAL at 10:28

## 2025-02-23 RX ADMIN — CARVEDILOL 12.5 MG: 12.5 TABLET, FILM COATED ORAL at 17:21

## 2025-02-23 RX ADMIN — FERROUS SULFATE TAB 325 MG (65 MG ELEMENTAL FE) 325 MG: 325 (65 FE) TAB at 10:28

## 2025-02-23 RX ADMIN — CARVEDILOL 12.5 MG: 12.5 TABLET, FILM COATED ORAL at 10:29

## 2025-02-23 RX ADMIN — FERROUS SULFATE TAB 325 MG (65 MG ELEMENTAL FE) 325 MG: 325 (65 FE) TAB at 17:21

## 2025-02-23 RX ADMIN — Medication 3 MG: at 23:47

## 2025-02-23 RX ADMIN — ANTACID TABLETS 500 MG: 500 TABLET, CHEWABLE ORAL at 21:16

## 2025-02-23 RX ADMIN — AMLODIPINE BESYLATE 10 MG: 10 TABLET ORAL at 10:28

## 2025-02-23 RX ADMIN — LEVOTHYROXINE SODIUM 125 MCG: 0.12 TABLET ORAL at 06:31

## 2025-02-23 RX ADMIN — CEFEPIME 2000 MG: 2 INJECTION, POWDER, FOR SOLUTION INTRAVENOUS at 02:41

## 2025-02-23 ASSESSMENT — PAIN DESCRIPTION - ORIENTATION: ORIENTATION: LEFT

## 2025-02-23 ASSESSMENT — PAIN DESCRIPTION - DESCRIPTORS: DESCRIPTORS: ACHING;DISCOMFORT

## 2025-02-23 ASSESSMENT — PAIN DESCRIPTION - LOCATION: LOCATION: HIP

## 2025-02-23 ASSESSMENT — PAIN SCALES - GENERAL: PAINLEVEL_OUTOF10: 3

## 2025-02-23 NOTE — CARE COORDINATION
DISCHARGE PLANNING NOTE:    LSW following for potential discharge to LTAC. Patient has been accepted at [facility], Insurance authorization submitted on 2/20. Message to Estefany in admissions to  see if patient is still meeting LTAC criteria. Patient still meets criteria., awaiting insurance determination.     Referral was sent to [facility] if patient no longer meets criteria and will need SNF placement. Per CM notes, patients son was working on LTC placement at facility

## 2025-02-24 LAB
ANION GAP SERPL CALCULATED.3IONS-SCNC: 7 MMOL/L (ref 9–16)
BASOPHILS # BLD: 0 K/UL (ref 0–0.2)
BASOPHILS NFR BLD: 0 % (ref 0–2)
BUN SERPL-MCNC: 13 MG/DL (ref 8–23)
CALCIUM SERPL-MCNC: 8.1 MG/DL (ref 8.6–10.4)
CHLORIDE SERPL-SCNC: 106 MMOL/L (ref 98–107)
CO2 SERPL-SCNC: 28 MMOL/L (ref 20–31)
CREAT SERPL-MCNC: 0.6 MG/DL (ref 0.7–1.2)
CRP SERPL HS-MCNC: 49.4 MG/L (ref 0–5)
EOSINOPHIL # BLD: 0.3 K/UL (ref 0–0.4)
EOSINOPHILS RELATIVE PERCENT: 2 % (ref 0–4)
ERYTHROCYTE [DISTWIDTH] IN BLOOD BY AUTOMATED COUNT: 15.6 % (ref 11.5–14.9)
GFR, ESTIMATED: >90 ML/MIN/1.73M2
GLUCOSE SERPL-MCNC: 93 MG/DL (ref 74–99)
HCT VFR BLD AUTO: 31.1 % (ref 36–46)
HGB BLD-MCNC: 10.1 G/DL (ref 12–16)
LYMPHOCYTES NFR BLD: 1.92 K/UL (ref 1–4.8)
LYMPHOCYTES RELATIVE PERCENT: 13 % (ref 24–44)
MCH RBC QN AUTO: 29 PG (ref 26–34)
MCHC RBC AUTO-ENTMCNC: 32.5 G/DL (ref 31–37)
MCV RBC AUTO: 89.3 FL (ref 80–100)
MONOCYTES NFR BLD: 1.48 K/UL (ref 0.1–1.3)
MONOCYTES NFR BLD: 10 % (ref 1–7)
MORPHOLOGY: ABNORMAL
MORPHOLOGY: ABNORMAL
NEUTROPHILS NFR BLD: 75 % (ref 36–66)
NEUTS SEG NFR BLD: 11.1 K/UL (ref 1.3–9.1)
PLATELET # BLD AUTO: 334 K/UL (ref 150–450)
PMV BLD AUTO: 7.1 FL (ref 6–12)
POTASSIUM SERPL-SCNC: 3.7 MMOL/L (ref 3.7–5.3)
RBC # BLD AUTO: 3.48 M/UL (ref 4–5.2)
SODIUM SERPL-SCNC: 141 MMOL/L (ref 136–145)
WBC OTHER # BLD: 14.8 K/UL (ref 3.5–11)

## 2025-02-24 PROCEDURE — 92526 ORAL FUNCTION THERAPY: CPT

## 2025-02-24 PROCEDURE — 76937 US GUIDE VASCULAR ACCESS: CPT

## 2025-02-24 PROCEDURE — C1751 CATH, INF, PER/CENT/MIDLINE: HCPCS

## 2025-02-24 PROCEDURE — 6370000000 HC RX 637 (ALT 250 FOR IP)

## 2025-02-24 PROCEDURE — 99232 SBSQ HOSP IP/OBS MODERATE 35: CPT | Performed by: INTERNAL MEDICINE

## 2025-02-24 PROCEDURE — 2580000003 HC RX 258: Performed by: INTERNAL MEDICINE

## 2025-02-24 PROCEDURE — 6360000002 HC RX W HCPCS: Performed by: INTERNAL MEDICINE

## 2025-02-24 PROCEDURE — 36415 COLL VENOUS BLD VENIPUNCTURE: CPT

## 2025-02-24 PROCEDURE — 99232 SBSQ HOSP IP/OBS MODERATE 35: CPT

## 2025-02-24 PROCEDURE — 2500000003 HC RX 250 WO HCPCS: Performed by: NURSE PRACTITIONER

## 2025-02-24 PROCEDURE — 6370000000 HC RX 637 (ALT 250 FOR IP): Performed by: NURSE PRACTITIONER

## 2025-02-24 PROCEDURE — 86140 C-REACTIVE PROTEIN: CPT

## 2025-02-24 PROCEDURE — 36410 VNPNXR 3YR/> PHY/QHP DX/THER: CPT

## 2025-02-24 PROCEDURE — 80048 BASIC METABOLIC PNL TOTAL CA: CPT

## 2025-02-24 PROCEDURE — G0545 PR INHERENT VISIT TO INPT: HCPCS | Performed by: INTERNAL MEDICINE

## 2025-02-24 PROCEDURE — 05HB33Z INSERTION OF INFUSION DEVICE INTO RIGHT BASILIC VEIN, PERCUTANEOUS APPROACH: ICD-10-PCS

## 2025-02-24 PROCEDURE — 6360000002 HC RX W HCPCS: Performed by: NURSE PRACTITIONER

## 2025-02-24 PROCEDURE — 2500000003 HC RX 250 WO HCPCS

## 2025-02-24 PROCEDURE — 85025 COMPLETE CBC W/AUTO DIFF WBC: CPT

## 2025-02-24 PROCEDURE — 2060000000 HC ICU INTERMEDIATE R&B

## 2025-02-24 RX ORDER — SODIUM CHLORIDE 0.9 % (FLUSH) 0.9 %
5-40 SYRINGE (ML) INJECTION EVERY 12 HOURS SCHEDULED
Status: DISCONTINUED | OUTPATIENT
Start: 2025-02-24 | End: 2025-02-27 | Stop reason: HOSPADM

## 2025-02-24 RX ORDER — LIDOCAINE HYDROCHLORIDE 10 MG/ML
50 INJECTION, SOLUTION EPIDURAL; INFILTRATION; INTRACAUDAL; PERINEURAL ONCE
Status: DISCONTINUED | OUTPATIENT
Start: 2025-02-24 | End: 2025-02-25

## 2025-02-24 RX ORDER — SODIUM CHLORIDE 0.9 % (FLUSH) 0.9 %
5-40 SYRINGE (ML) INJECTION PRN
Status: DISCONTINUED | OUTPATIENT
Start: 2025-02-24 | End: 2025-02-27 | Stop reason: HOSPADM

## 2025-02-24 RX ORDER — SODIUM CHLORIDE 9 MG/ML
INJECTION, SOLUTION INTRAVENOUS PRN
Status: DISCONTINUED | OUTPATIENT
Start: 2025-02-24 | End: 2025-02-27 | Stop reason: HOSPADM

## 2025-02-24 RX ORDER — LINEZOLID 600 MG/1
600 TABLET, FILM COATED ORAL 2 TIMES DAILY
Qty: 8 TABLET | Refills: 0
Start: 2025-02-24 | End: 2025-02-25

## 2025-02-24 RX ADMIN — CARVEDILOL 12.5 MG: 12.5 TABLET, FILM COATED ORAL at 08:53

## 2025-02-24 RX ADMIN — CEFEPIME 2000 MG: 2 INJECTION, POWDER, FOR SOLUTION INTRAVENOUS at 03:02

## 2025-02-24 RX ADMIN — FERROUS SULFATE TAB 325 MG (65 MG ELEMENTAL FE) 325 MG: 325 (65 FE) TAB at 17:56

## 2025-02-24 RX ADMIN — DONEPEZIL HYDROCHLORIDE 10 MG: 10 TABLET, FILM COATED ORAL at 22:02

## 2025-02-24 RX ADMIN — MICONAZOLE NITRATE: 20 POWDER TOPICAL at 09:00

## 2025-02-24 RX ADMIN — ATORVASTATIN CALCIUM 80 MG: 80 TABLET, FILM COATED ORAL at 22:02

## 2025-02-24 RX ADMIN — CARVEDILOL 12.5 MG: 12.5 TABLET, FILM COATED ORAL at 17:56

## 2025-02-24 RX ADMIN — SODIUM CHLORIDE, PRESERVATIVE FREE 10 ML: 5 INJECTION INTRAVENOUS at 22:02

## 2025-02-24 RX ADMIN — LISINOPRIL 40 MG: 20 TABLET ORAL at 08:53

## 2025-02-24 RX ADMIN — CEFEPIME 2000 MG: 2 INJECTION, POWDER, FOR SOLUTION INTRAVENOUS at 11:18

## 2025-02-24 RX ADMIN — AMLODIPINE BESYLATE 10 MG: 10 TABLET ORAL at 08:53

## 2025-02-24 RX ADMIN — ASPIRIN 81 MG: 81 TABLET, COATED ORAL at 08:53

## 2025-02-24 RX ADMIN — LINEZOLID 600 MG: 600 INJECTION, SOLUTION INTRAVENOUS at 12:51

## 2025-02-24 RX ADMIN — ENOXAPARIN SODIUM 40 MG: 100 INJECTION SUBCUTANEOUS at 08:53

## 2025-02-24 RX ADMIN — LINEZOLID 600 MG: 600 INJECTION, SOLUTION INTRAVENOUS at 01:50

## 2025-02-24 RX ADMIN — CEFEPIME 2000 MG: 2 INJECTION, POWDER, FOR SOLUTION INTRAVENOUS at 17:55

## 2025-02-24 RX ADMIN — LEVOTHYROXINE SODIUM 125 MCG: 0.12 TABLET ORAL at 05:55

## 2025-02-24 RX ADMIN — MICONAZOLE NITRATE: 20 POWDER TOPICAL at 22:03

## 2025-02-24 RX ADMIN — SODIUM CHLORIDE, PRESERVATIVE FREE 10 ML: 5 INJECTION INTRAVENOUS at 08:53

## 2025-02-24 RX ADMIN — FERROUS SULFATE TAB 325 MG (65 MG ELEMENTAL FE) 325 MG: 325 (65 FE) TAB at 08:53

## 2025-02-24 ASSESSMENT — ENCOUNTER SYMPTOMS
APNEA: 0
SHORTNESS OF BREATH: 0
COLOR CHANGE: 0
EYE ITCHING: 0
CHEST TIGHTNESS: 0
EYE PAIN: 0
COUGH: 0
ABDOMINAL DISTENTION: 0

## 2025-02-24 NOTE — CARE COORDINATION
DISCHARGE PLANNING NOTE:     LSW following for potential discharge to LTAC. Patient has been accepted at [facility], Insurance authorization submitted on 2/20.  Still pending at this time per Angelika in admissions. Updated clinicals faxed this morning.    Referral was sent to [facility] if patient no longer meets criteria and will need SNF placement. Per CM notes, patients son was working on LTC placement at facility

## 2025-02-24 NOTE — PROCEDURES
PROCEDURE NOTE  Date: 2/24/2025   Name: Lanny Jaimes  YOB: 1955    Procedures        Midline insertion note:     Prescribed therapy: Long term ATBX  Product type: 4.5fr single lumen Antimicrobial/Antithrombogenic Arrow Midline  History/Labs/Allergies Reviewed  Placed By:   Nixon - YOHAN IV Team    Time out Performed using Two Identifiers    Total length: 15cm (blue flex tip intact).   External catheter length: 2cm  Extremity circumference at insertion site: 27cm  Number of attempts: 1  Estimated blood loss: 0 ml  Placement verified by: positive blood return & flushes easily  Special equipment used: ultrasound & micro-introducer technique   Catheter secured with adhesive securement device  Catheter adhesive (SecureportIV) utilized at insertion site  Dressing applied: Tegaderm CHG  Lidocaine administered intradermally conc.1%: approx 1 mL    Midline education:     [  X] Post care line insertion was discussed with patient/Family or POA prior to procedure.  Risks, benefits, alternatives, and reason for procedure were discussed and teaching was reinforced. An educational handout on post insertion line care and maintenance was left at bedside with patient or in chart. Patient (Family or POA) acknowledged understanding of information relayed.

## 2025-02-25 ENCOUNTER — APPOINTMENT (OUTPATIENT)
Dept: GENERAL RADIOLOGY | Age: 70
DRG: 177 | End: 2025-02-25
Payer: MEDICARE

## 2025-02-25 LAB
CRP SERPL HS-MCNC: 56.7 MG/L (ref 0–5)
GLUCOSE BLD-MCNC: 203 MG/DL (ref 65–105)

## 2025-02-25 PROCEDURE — 82947 ASSAY GLUCOSE BLOOD QUANT: CPT

## 2025-02-25 PROCEDURE — 99232 SBSQ HOSP IP/OBS MODERATE 35: CPT | Performed by: INTERNAL MEDICINE

## 2025-02-25 PROCEDURE — 6370000000 HC RX 637 (ALT 250 FOR IP): Performed by: NURSE PRACTITIONER

## 2025-02-25 PROCEDURE — 97530 THERAPEUTIC ACTIVITIES: CPT

## 2025-02-25 PROCEDURE — 36415 COLL VENOUS BLD VENIPUNCTURE: CPT

## 2025-02-25 PROCEDURE — G0545 PR INHERENT VISIT TO INPT: HCPCS | Performed by: INTERNAL MEDICINE

## 2025-02-25 PROCEDURE — 86140 C-REACTIVE PROTEIN: CPT

## 2025-02-25 PROCEDURE — 2060000000 HC ICU INTERMEDIATE R&B

## 2025-02-25 PROCEDURE — 99233 SBSQ HOSP IP/OBS HIGH 50: CPT

## 2025-02-25 PROCEDURE — 71045 X-RAY EXAM CHEST 1 VIEW: CPT

## 2025-02-25 PROCEDURE — 6360000002 HC RX W HCPCS: Performed by: INTERNAL MEDICINE

## 2025-02-25 PROCEDURE — 2580000003 HC RX 258: Performed by: INTERNAL MEDICINE

## 2025-02-25 PROCEDURE — 92526 ORAL FUNCTION THERAPY: CPT

## 2025-02-25 PROCEDURE — 6370000000 HC RX 637 (ALT 250 FOR IP)

## 2025-02-25 PROCEDURE — 6360000002 HC RX W HCPCS: Performed by: NURSE PRACTITIONER

## 2025-02-25 RX ORDER — LINEZOLID 2 MG/ML
600 INJECTION, SOLUTION INTRAVENOUS EVERY 12 HOURS
Status: COMPLETED | OUTPATIENT
Start: 2025-02-26 | End: 2025-02-27

## 2025-02-25 RX ORDER — LINEZOLID 600 MG/1
600 TABLET, FILM COATED ORAL 2 TIMES DAILY
Qty: 4 TABLET | Refills: 0
Start: 2025-02-25 | End: 2025-02-27

## 2025-02-25 RX ADMIN — CEFEPIME 2000 MG: 2 INJECTION, POWDER, FOR SOLUTION INTRAVENOUS at 10:31

## 2025-02-25 RX ADMIN — CEFEPIME 2000 MG: 2 INJECTION, POWDER, FOR SOLUTION INTRAVENOUS at 17:43

## 2025-02-25 RX ADMIN — Medication 3 MG: at 20:24

## 2025-02-25 RX ADMIN — POLYETHYLENE GLYCOL 3350 17 G: 17 POWDER, FOR SOLUTION ORAL at 09:05

## 2025-02-25 RX ADMIN — LEVOTHYROXINE SODIUM 125 MCG: 0.12 TABLET ORAL at 06:01

## 2025-02-25 RX ADMIN — CARVEDILOL 12.5 MG: 12.5 TABLET, FILM COATED ORAL at 17:40

## 2025-02-25 RX ADMIN — LINEZOLID 600 MG: 600 INJECTION, SOLUTION INTRAVENOUS at 14:56

## 2025-02-25 RX ADMIN — ATORVASTATIN CALCIUM 80 MG: 80 TABLET, FILM COATED ORAL at 20:24

## 2025-02-25 RX ADMIN — LISINOPRIL 40 MG: 20 TABLET ORAL at 09:04

## 2025-02-25 RX ADMIN — DONEPEZIL HYDROCHLORIDE 10 MG: 10 TABLET, FILM COATED ORAL at 20:24

## 2025-02-25 RX ADMIN — ENOXAPARIN SODIUM 40 MG: 100 INJECTION SUBCUTANEOUS at 09:04

## 2025-02-25 RX ADMIN — AMLODIPINE BESYLATE 10 MG: 10 TABLET ORAL at 09:05

## 2025-02-25 RX ADMIN — MICONAZOLE NITRATE: 20 POWDER TOPICAL at 09:06

## 2025-02-25 RX ADMIN — CEFEPIME 2000 MG: 2 INJECTION, POWDER, FOR SOLUTION INTRAVENOUS at 02:58

## 2025-02-25 RX ADMIN — FERROUS SULFATE TAB 325 MG (65 MG ELEMENTAL FE) 325 MG: 325 (65 FE) TAB at 09:04

## 2025-02-25 RX ADMIN — ASPIRIN 81 MG: 81 TABLET, COATED ORAL at 09:05

## 2025-02-25 RX ADMIN — MICONAZOLE NITRATE: 20 POWDER TOPICAL at 20:25

## 2025-02-25 RX ADMIN — LINEZOLID 600 MG: 600 INJECTION, SOLUTION INTRAVENOUS at 00:51

## 2025-02-25 RX ADMIN — FERROUS SULFATE TAB 325 MG (65 MG ELEMENTAL FE) 325 MG: 325 (65 FE) TAB at 17:40

## 2025-02-25 RX ADMIN — CARVEDILOL 12.5 MG: 12.5 TABLET, FILM COATED ORAL at 09:04

## 2025-02-25 ASSESSMENT — ENCOUNTER SYMPTOMS
SHORTNESS OF BREATH: 0
EYE PAIN: 0
CHEST TIGHTNESS: 0
EYE ITCHING: 0
APNEA: 0
COLOR CHANGE: 0
ABDOMINAL DISTENTION: 0
COUGH: 0

## 2025-02-25 NOTE — CARE COORDINATION
DISCHARGE PLANNING NOTE:    Writer spoke to Deya with [facility] to discuss acceptance if pt does not meet criteria for LTACH. Per Deya, pt son Sha is supposed to bring completed Medicaid forms to facility today. Facility accepts this pt on 2/28 due to isolation status, pending Medicaid forms received. Pt will require authorization for admission     Call placed to Sha to discuss LTACH vs. SNF and form required for admission by [facility]. Sha confirms that form will be taken to facility early this afternoon by his spouse. Writer provided contact information for any updates.  Electronically signed by LUIS ENRIQUE Perez on 2/25/2025 at 11:37 AM

## 2025-02-25 NOTE — CARE COORDINATION
DISCHARGE PLANNING NOTE:    Writer is following for potential discharge to [facility]. Message placed to [facility] with [facility] to check authorization status, pending at this time.  Electronically signed by LUIS ENRIQUE Perez on 2/25/2025 at 9:50 AM

## 2025-02-26 LAB
ANION GAP SERPL CALCULATED.3IONS-SCNC: 9 MMOL/L (ref 9–16)
BASOPHILS # BLD: 0 K/UL (ref 0–0.2)
BASOPHILS NFR BLD: 0 % (ref 0–2)
BUN SERPL-MCNC: 13 MG/DL (ref 8–23)
CALCIUM SERPL-MCNC: 8.5 MG/DL (ref 8.6–10.4)
CHLORIDE SERPL-SCNC: 103 MMOL/L (ref 98–107)
CO2 SERPL-SCNC: 27 MMOL/L (ref 20–31)
CREAT SERPL-MCNC: 0.5 MG/DL (ref 0.7–1.2)
CRP SERPL HS-MCNC: 43.2 MG/L (ref 0–5)
EOSINOPHIL # BLD: 0.2 K/UL (ref 0–0.4)
EOSINOPHILS RELATIVE PERCENT: 1 % (ref 0–4)
ERYTHROCYTE [DISTWIDTH] IN BLOOD BY AUTOMATED COUNT: 16.1 % (ref 11.5–14.9)
GFR, ESTIMATED: >90 ML/MIN/1.73M2
GLUCOSE SERPL-MCNC: 99 MG/DL (ref 74–99)
HCT VFR BLD AUTO: 35.4 % (ref 36–46)
HGB BLD-MCNC: 11.4 G/DL (ref 12–16)
LYMPHOCYTES NFR BLD: 1.57 K/UL (ref 1–4.8)
LYMPHOCYTES RELATIVE PERCENT: 8 % (ref 24–44)
MCH RBC QN AUTO: 29.5 PG (ref 26–34)
MCHC RBC AUTO-ENTMCNC: 32.3 G/DL (ref 31–37)
MCV RBC AUTO: 91.4 FL (ref 80–100)
MONOCYTES NFR BLD: 1.18 K/UL (ref 0.1–1.3)
MONOCYTES NFR BLD: 6 % (ref 1–7)
MORPHOLOGY: ABNORMAL
MORPHOLOGY: ABNORMAL
MYELOCYTES ABSOLUTE COUNT: 0.2 K/UL
MYELOCYTES: 1 %
NEUTROPHILS NFR BLD: 84 % (ref 36–66)
NEUTS SEG NFR BLD: 16.45 K/UL (ref 1.3–9.1)
PLATELET # BLD AUTO: 370 K/UL (ref 150–450)
PMV BLD AUTO: 7 FL (ref 6–12)
POTASSIUM SERPL-SCNC: 3.9 MMOL/L (ref 3.7–5.3)
RBC # BLD AUTO: 3.88 M/UL (ref 4–5.2)
SODIUM SERPL-SCNC: 139 MMOL/L (ref 136–145)
WBC OTHER # BLD: 19.6 K/UL (ref 3.5–11)

## 2025-02-26 PROCEDURE — 2580000003 HC RX 258: Performed by: INTERNAL MEDICINE

## 2025-02-26 PROCEDURE — 6370000000 HC RX 637 (ALT 250 FOR IP): Performed by: NURSE PRACTITIONER

## 2025-02-26 PROCEDURE — 36415 COLL VENOUS BLD VENIPUNCTURE: CPT

## 2025-02-26 PROCEDURE — 6370000000 HC RX 637 (ALT 250 FOR IP)

## 2025-02-26 PROCEDURE — 86140 C-REACTIVE PROTEIN: CPT

## 2025-02-26 PROCEDURE — 85025 COMPLETE CBC W/AUTO DIFF WBC: CPT

## 2025-02-26 PROCEDURE — 97535 SELF CARE MNGMENT TRAINING: CPT

## 2025-02-26 PROCEDURE — 99233 SBSQ HOSP IP/OBS HIGH 50: CPT

## 2025-02-26 PROCEDURE — 92526 ORAL FUNCTION THERAPY: CPT

## 2025-02-26 PROCEDURE — 97530 THERAPEUTIC ACTIVITIES: CPT

## 2025-02-26 PROCEDURE — 6360000002 HC RX W HCPCS: Performed by: NURSE PRACTITIONER

## 2025-02-26 PROCEDURE — 6360000002 HC RX W HCPCS: Performed by: INTERNAL MEDICINE

## 2025-02-26 PROCEDURE — 80048 BASIC METABOLIC PNL TOTAL CA: CPT

## 2025-02-26 PROCEDURE — 1200000000 HC SEMI PRIVATE

## 2025-02-26 RX ADMIN — MICONAZOLE NITRATE: 20 POWDER TOPICAL at 10:05

## 2025-02-26 RX ADMIN — CEFEPIME 2000 MG: 2 INJECTION, POWDER, FOR SOLUTION INTRAVENOUS at 18:16

## 2025-02-26 RX ADMIN — ENOXAPARIN SODIUM 40 MG: 100 INJECTION SUBCUTANEOUS at 10:05

## 2025-02-26 RX ADMIN — LISINOPRIL 40 MG: 20 TABLET ORAL at 10:05

## 2025-02-26 RX ADMIN — AMLODIPINE BESYLATE 10 MG: 10 TABLET ORAL at 10:05

## 2025-02-26 RX ADMIN — FERROUS SULFATE TAB 325 MG (65 MG ELEMENTAL FE) 325 MG: 325 (65 FE) TAB at 10:05

## 2025-02-26 RX ADMIN — DONEPEZIL HYDROCHLORIDE 10 MG: 10 TABLET, FILM COATED ORAL at 20:26

## 2025-02-26 RX ADMIN — FERROUS SULFATE TAB 325 MG (65 MG ELEMENTAL FE) 325 MG: 325 (65 FE) TAB at 17:34

## 2025-02-26 RX ADMIN — CARVEDILOL 12.5 MG: 12.5 TABLET, FILM COATED ORAL at 17:34

## 2025-02-26 RX ADMIN — CEFEPIME 2000 MG: 2 INJECTION, POWDER, FOR SOLUTION INTRAVENOUS at 02:59

## 2025-02-26 RX ADMIN — CEFEPIME 2000 MG: 2 INJECTION, POWDER, FOR SOLUTION INTRAVENOUS at 10:47

## 2025-02-26 RX ADMIN — ASPIRIN 81 MG: 81 TABLET, COATED ORAL at 10:05

## 2025-02-26 RX ADMIN — LINEZOLID 600 MG: 600 INJECTION, SOLUTION INTRAVENOUS at 00:54

## 2025-02-26 RX ADMIN — CARVEDILOL 12.5 MG: 12.5 TABLET, FILM COATED ORAL at 10:05

## 2025-02-26 RX ADMIN — LEVOTHYROXINE SODIUM 125 MCG: 0.12 TABLET ORAL at 05:54

## 2025-02-26 RX ADMIN — ATORVASTATIN CALCIUM 80 MG: 80 TABLET, FILM COATED ORAL at 20:26

## 2025-02-26 RX ADMIN — LINEZOLID 600 MG: 600 INJECTION, SOLUTION INTRAVENOUS at 12:43

## 2025-02-26 NOTE — CARE COORDINATION
DISCHARGE PLANNING NOTE:    Writer received call from [facility], facility accepts this pt, all necessary documents have been received. Facility accepts pt tomorrow, no authorization required due to [employer] Medicare waiver.     Call placed to pt cindi Dalton for update on acceptance. No answer, voicemail left for return call.    [employer] message placed to bedside YOHAN To for update.   Electronically signed by LUIS ENRIQUE Perez on 2/26/2025 at 11:45 AM

## 2025-02-26 NOTE — CARE COORDINATION
DISCHARGE PLANNING NOTE:    Writer placed message to Deya with [facility] to verify facility has received Medicaid form from family 2/25. Facility is placing call to Sha, statements not received by facility.     Call placed to Sha to inquire about the need for these forms. No answer, voicemail left for return call.  Electronically signed by LUIS ENRIQUE Perez on 2/26/2025 at 10:47 AM

## 2025-02-27 VITALS
HEART RATE: 81 BPM | BODY MASS INDEX: 24.91 KG/M2 | WEIGHT: 158.73 LBS | RESPIRATION RATE: 18 BRPM | SYSTOLIC BLOOD PRESSURE: 145 MMHG | DIASTOLIC BLOOD PRESSURE: 70 MMHG | HEIGHT: 67 IN | OXYGEN SATURATION: 93 % | TEMPERATURE: 98.5 F

## 2025-02-27 PROCEDURE — 2580000003 HC RX 258: Performed by: INTERNAL MEDICINE

## 2025-02-27 PROCEDURE — 99239 HOSP IP/OBS DSCHRG MGMT >30: CPT | Performed by: INTERNAL MEDICINE

## 2025-02-27 PROCEDURE — 6360000002 HC RX W HCPCS: Performed by: NURSE PRACTITIONER

## 2025-02-27 PROCEDURE — 6360000002 HC RX W HCPCS: Performed by: INTERNAL MEDICINE

## 2025-02-27 PROCEDURE — 6370000000 HC RX 637 (ALT 250 FOR IP): Performed by: NURSE PRACTITIONER

## 2025-02-27 PROCEDURE — 6370000000 HC RX 637 (ALT 250 FOR IP)

## 2025-02-27 RX ORDER — FERROUS SULFATE 325(65) MG
325 TABLET ORAL 2 TIMES DAILY WITH MEALS
Qty: 30 TABLET | Refills: 3 | Status: SHIPPED | OUTPATIENT
Start: 2025-02-27

## 2025-02-27 RX ORDER — CARVEDILOL 12.5 MG/1
12.5 TABLET ORAL 2 TIMES DAILY WITH MEALS
Qty: 60 TABLET | Refills: 3 | Status: SHIPPED | OUTPATIENT
Start: 2025-02-27

## 2025-02-27 RX ADMIN — CEFEPIME 2000 MG: 2 INJECTION, POWDER, FOR SOLUTION INTRAVENOUS at 03:23

## 2025-02-27 RX ADMIN — FERROUS SULFATE TAB 325 MG (65 MG ELEMENTAL FE) 325 MG: 325 (65 FE) TAB at 07:15

## 2025-02-27 RX ADMIN — LINEZOLID 600 MG: 600 INJECTION, SOLUTION INTRAVENOUS at 02:03

## 2025-02-27 RX ADMIN — CARVEDILOL 12.5 MG: 12.5 TABLET, FILM COATED ORAL at 07:15

## 2025-02-27 RX ADMIN — ASPIRIN 81 MG: 81 TABLET, COATED ORAL at 07:15

## 2025-02-27 RX ADMIN — CEFEPIME 2000 MG: 2 INJECTION, POWDER, FOR SOLUTION INTRAVENOUS at 10:31

## 2025-02-27 RX ADMIN — LEVOTHYROXINE SODIUM 125 MCG: 0.12 TABLET ORAL at 05:43

## 2025-02-27 RX ADMIN — LISINOPRIL 40 MG: 20 TABLET ORAL at 07:15

## 2025-02-27 RX ADMIN — MICONAZOLE NITRATE: 20 POWDER TOPICAL at 07:18

## 2025-02-27 RX ADMIN — POLYETHYLENE GLYCOL 3350 17 G: 17 POWDER, FOR SOLUTION ORAL at 07:16

## 2025-02-27 RX ADMIN — ENOXAPARIN SODIUM 40 MG: 100 INJECTION SUBCUTANEOUS at 07:16

## 2025-02-27 RX ADMIN — AMLODIPINE BESYLATE 10 MG: 10 TABLET ORAL at 07:15

## 2025-02-27 NOTE — DISCHARGE SUMMARY
[facility] Internal Medicine    Uriel Elliott MD; Srinivasan Zaragoza MD, Chad Gordon MD, Juliet Murillo MD,Dr. KECIA Richard MD. ; Bonita Argueta MD      [facility] Internal Medicine  IN-PATIENT SERVICE   [facility]    Discharge Summary     Patient ID: Lanny Jaimes  :  1955   MRN: [id number]     ACCOUNT:  [id number]   Patient's PCP: Juan Antonio Barreto MD  Admit Date: 2025   Discharge Date: 2025    Length of Stay: 10  Code Status:  DNR-CCA  Admitting Physician: Bonita Argueta MD  Discharge Physician: Bonita Argueta MD     Active Discharge Diagnoses:     Hospital Problem Lists:  Principal Problem:    Multifocal pneumonia  Active Problems:    Cerebral amyloid angiopathy (HCC)    Acquired hypothyroidism    Primary hypertension    Frequent falls    COVID-19    Bandemia    CRP elevated    Oral phase dysphagia    Leukocytosis  Resolved Problems:    * No resolved hospital problems. *      Admission Condition:  Serious      Discharged Condition: Stable     Hospital Stay:     Hospital Course:  69-year-old female with a recent intracranial hemorrhage in the setting of frequent falls admitted with multifocal pneumonia possible aspiration pneumonia, broad-spectrum antibiotics aztreonam and vancomycin added, infectious disease consulted at this time, cultures are pending, will get speech on board, breathing treatments,  Acute respiratory failure due to above,  Acute metabolic encephalopathy secondary to intracranial hemorrhage and recent cerebral insult,  Recent incidental left cavernous ICA aneurysm status post IR DSA on 2025,  Possible cerebral amyloid angiopathy versus hypertensive angiopathy,  # Poorly controlled hypertension, continue home medications,  Hypothyroidism, continue levothyroxine,  COVID-positive, multifocal pneumonia at this time treated with dexamethasone and above antibiotics, infectious disease on board, patient was initially in ICU, then

## 2025-02-27 NOTE — CARE COORDINATION
DISCHARGE PLANNING NOTE:    Transportation arranged for patient to go to [facility] at 1230 via [facility]. Facility informed. Bedside nurse informed.     Number for Report: [phone]  Electronically signed by LUIS ENRIQUE Perez on 2/27/2025 at 12:07 PM

## 2025-02-27 NOTE — CARE COORDINATION
DISCHARGE PLANNING NOTE:    Writer is following for potential discharge to [facility], facility accepts this pt at anytime.    Call placed to pt son for update on potential discharge and to obtain signature for IMM letter. No answer, voicemail left for return call.    Call placed to bedside RN Yousuf for update on potential transportation and to request VIJAY to be signed and completed.    Forms faxed to [facility] for scheduling.    Electronically signed by LUIS ENRIQUE Perez on 2/27/2025 at 10:46 AM

## 2025-02-27 NOTE — CARE COORDINATION
Continuity of Care Form    Patient Name: Lanny Jaimes   :  1955  MRN:  [id number]    Admit date:  2025  Discharge date:  25    Code Status Order: Full Code   Advance Directives:   Advance Care Flowsheet Documentation             Admitting Physician:  Bonita Argueta MD  PCP: Juan Antonio Barreto MD    Discharging Nurse:   Discharging Hospital Unit/Room#: 2090/209-01  Discharging Unit Phone Number: [phone]    Emergency Contact:   Extended Emergency Contact Information  Primary Emergency Contact: kim Friedman  Mobile Phone: [phone]  Relation: Child  Preferred language: English   needed? No  Secondary Emergency Contact: Lavinia Blanc  Mobile Phone: [phone]  Relation: Friend  Preferred language: English   needed? No    Past Surgical History:  No past surgical history on file.    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

## 2025-02-27 NOTE — PROGRESS NOTES
[facility] -   Infectious diseases evaluation  admission date 2/17/2025    reason for consultation:   COVID-19 infection    Impression :   Current:  COVID-19 with possible superimposed bacterial infection   Multifocal pneumonia/witnessed aspiration on 2/17/2025  CPP elevation 54 - 106 - 69  Bandemia 12  - 15   Dysphagia - failed swall study 2/20  Acute hypoxic respiratory failure on high flow oxygen  1 of 2 positive blood culture for coagulase-negative staph likely contamination  Positive MRSA swab  Recent fall, intraparenchymal hemorrhage, no intervention by neurosurgery  Brain aneurysm status post IR carotid cerebral angiogram 1/9/2025  Penicillin allergy with anaphylaxis/the patient tolerated cephalosporins in the past    HENCE:     Discontinue aztreonam and clindamycin  CT suggesting bacterial multifocal alveolar pneumonia  - doubt covid  pneumonia   Failed swall study 2/20  In general better on AB   2/19   Hi flow  better  70 - 40 %  and she feels better, no phlem  Keep zyvox iv and IV cefepime  Stop Barcitinib 2/19 - start remdesevir 5 days  Stop Decadron  2/20  Failed swall study large aspiration on thins -dysphagia diet  Better clinically and 1 L NC  CRP better as below  Could not give a sp cx yet  Track response w C-reactive protein  54 - 106  - 69 better    antibiotic plan  Remdevir 2/19 -2/24  Zyvox and cefepime 2/19 -2/25    Infection Control Recommendations   [place] Precautions  Droplet plus isolation    Antimicrobial Stewardship Recommendations   Simplification of therapy  Targeted therapy      History of Present Illness:   Initial history:  Lanny Jaimes is a 69 y.o.-year-old female presented to hospital from [facility] for worsening cough associated with nasal congestion, generalized weakness and decreased appetite for 3 days.  The patient was hypoxic, was placed initially on 4 L of oxygen per nasal cannula.  The patient had witnessed aspiration while eating 
          [facility] -   Infectious diseases evaluation  admission date 2/17/2025    reason for consultation:   COVID-19 infection    Impression :   Current:  COVID-19 with possible superimposed bacterial infection   Multifocal pneumonia/witnessed aspiration on 2/17/2025  CPP elevation 54 - 106 - 69 - 56  Bandemia 12  - 15   Dysphagia - failed swall study 2/20  Acute hypoxic respiratory failure on high flow oxygen  1 of 2 positive blood culture for coagulase-negative staph likely contamination  Positive MRSA swab  Recent fall, intraparenchymal hemorrhage, no intervention by neurosurgery  Brain aneurysm status post IR carotid cerebral angiogram 1/9/2025  Penicillin allergy with anaphylaxis/the patient tolerated cephalosporins in the past    HENCE:     CT suggesting bacterial multifocal alveolar pneumonia  - doubt covid  pneumonia   Failed swall study 2/20  In general better on AB       2/19   Hi flow  better  70 - 40 %  and she feels better, no phlem  Keep zyvox iv and IV cefepime  Stop Barcitinib 2/19 - start remdesevir 5 days completed  Stop Decadron  2/20  Failed swall study large aspiration on thins -dysphagia diet  Better clinically and 1 L NC  CRP better as below  Could not give a sp cx yet  Track response w C-reactive protein   106  - 69 - 49 - 49  - 56 better -   2/24 repeat CXR shows most infilt resolved  2/24 Bandemia persistent 14    antibiotic plan  Remdevir 2/19 -2/24  Zyvox and cefepime 2/19 -2/27  Reconsiled to   - ok for DC    Infection Control Recommendations   [place] Precautions  Droplet plus isolation    Antimicrobial Stewardship Recommendations   Simplification of therapy  Targeted therapy      History of Present Illness:   Initial history:  Lanny Jaimes is a 69 y.o.-year-old female presented to hospital from [facility] for worsening cough associated with nasal congestion, generalized weakness and decreased appetite for 3 days.  The patient was hypoxic, was 
          [facility] -   Infectious diseases evaluation  admission date 2/17/2025    reason for consultation:   COVID-19 infection    Impression :   Current:  COVID-19 with possible superimposed bacterial infection   Multifocal pneumonia/witnessed aspiration on 2/17/2025  CPP elevation 54 - 106  Bandemia 12  - 15  Acute hypoxic respiratory failure on high flow oxygen  1 of 2 positive blood culture for coagulase-negative staph likely contamination  Positive MRSA swab  Recent fall, intraparenchymal hemorrhage, no intervention by neurosurgery  Brain aneurysm status post IR carotid cerebral angiogram 1/9/2025  Penicillin allergy with anaphylaxis/the patient tolerated cephalosporins in the past    HENCE:     Discontinue aztreonam and clindamycin  CT suggesting bacterial multifocal alveolar pneumonia  - doubt covid  pneumonia   2/19 Hi flow  better  70 - 40 %  and she feels better, no phlem  Keep zyvox iv and IV cefepime  Respiratory culture  Stop Barcitinib 2/19 - start remdesevir 5 days  Stop Decadron  Follow C-reactive protein,  54 - 106 for response  Get sp x      Infection Control Recommendations   [place] Precautions  Droplet plus isolation    Antimicrobial Stewardship Recommendations   Simplification of therapy  Targeted therapy      History of Present Illness:   Initial history:  Lanny Jaimes is a 69 y.o.-year-old female presented to hospital from [facility] for worsening cough associated with nasal congestion, generalized weakness and decreased appetite for 3 days.  The patient was hypoxic, was placed initially on 4 L of oxygen per nasal cannula.  The patient had witnessed aspiration while eating lunch on 2/17/2025, oxygen requirement increased and was placed on high flow oxygen.  She is currently on high flow oxygen per nasal cannula, complaining of cough and shortness of breath, denied nausea or vomiting, no fever or chills, no other complaints.  COVID-19 test was 
     [facility] Internal Medicine  Uriel Elliott MD; Srinivasan Zaragoza MD, Chad Gordon MD, Juliet Murillo MD,    Bonita Argueta MD, Janeen Richard MD.    [facility] Internal Medicine   IN-PATIENT SERVICE   [facility]    Progress Note            Date:   2/26/2025  Patient name:  Lanny Jaimes  Date of admission:  2/17/2025  3:13 AM  MRN:   [id number]  Account:  [id number]  YOB: 1955  PCP:    Juan Antonio Barreto MD  Room:   2121/2121-01  Code Status:    DNR-CCA    Chief Complaint:     Chief Complaint   Patient presents with    Cough    Cold Symptoms     Hypoxic at the [facility] home, has been febrile and coughing.        History Obtained From:     Patient/EMR/Bedside RN    History of Present Illness:     Lanny Jaimes is a 69 y.o. Non- / non  female who presents with Cough and Cold Symptoms (Hypoxic at the [facility] home, has been febrile and coughing. )   and is admitted to the hospital for the management of Multifocal pneumonia.     Patient's medical history significant for cerebral amyloid angiopathy, frequent falls, major neurocognitive disorder, HTN, and acquired hypothyroidism.     Patient has dementia at baseline and is unable to provide any input into HPI, other than stating she has not been feeling well.  According to ED provider, patient presented from ECF per EMS after she was noted to be not acting her usual self.  She was noted to be hypoxic with SpO2 in the 80s.  EMS applied nonrebreather and transported to ED. review of epic indicates the patient was recently discharged from ARU after receiving rehabilitative services for CVA.     CXR obtained in ED shows new patchy opacities in the right mid to lower lung more than the left lower lung.  Features could represent pneumonia in the correct clinical setting. Borderline size of the heart and central vasculature and could true mild pulmonary vascular congestion.  Rapid swab positive for COVID-19.  
     [facility] Internal Medicine  Uriel Elliott MD; Srinivasan Zaragoza MD, Chad Gordon MD, Juliet Murillo MD,    Bonita Argueta MD, Janeen Richard MD.    [facility] Internal Medicine   IN-PATIENT SERVICE   [facility]    Progress Note            Date:   2/23/2025  Patient name:  Lanny Jaimes  Date of admission:  2/17/2025  3:13 AM  MRN:   [id number]  Account:  [id number]  YOB: 1955  PCP:    Juan Antonio Barreto MD  Room:   2121/2121-01  Code Status:    DNR-CCA    Chief Complaint:     Chief Complaint   Patient presents with    Cough    Cold Symptoms     Hypoxic at the [facility] home, has been febrile and coughing.        History Obtained From:     Patient/EMR/Bedside RN    History of Present Illness:     Lanny Jaimes is a 69 y.o. Non- / non  female who presents with Cough and Cold Symptoms (Hypoxic at the [facility] home, has been febrile and coughing. )   and is admitted to the hospital for the management of Multifocal pneumonia.     Patient's medical history significant for cerebral amyloid angiopathy, frequent falls, major neurocognitive disorder, HTN, and acquired hypothyroidism.     Patient has dementia at baseline and is unable to provide any input into HPI, other than stating she has not been feeling well.  According to ED provider, patient presented from ECF per EMS after she was noted to be not acting her usual self.  She was noted to be hypoxic with SpO2 in the 80s.  EMS applied nonrebreather and transported to ED. review of epic indicates the patient was recently discharged from ARU after receiving rehabilitative services for CVA.     CXR obtained in ED shows new patchy opacities in the right mid to lower lung more than the left lower lung.  Features could represent pneumonia in the correct clinical setting. Borderline size of the heart and central vasculature and could true mild pulmonary vascular congestion.  Rapid swab positive for COVID-19.  
     [facility] Internal Medicine  Uriel Elliott MD; Srinivasan Zaragoza MD, Chad Gordon MD, Juliet Murillo MD,    Bonita Argueta MD, Jaenen Richard MD.    [facility] Internal Medicine   IN-PATIENT SERVICE   [facility]    Progress Note            Date:   2/24/2025  Patient name:  Lanny Jaimes  Date of admission:  2/17/2025  3:13 AM  MRN:   [id number]  Account:  [id number]  YOB: 1955  PCP:    Juan Antonio Barreto MD  Room:   2121/2121-01  Code Status:    DNR-CCA    Chief Complaint:     Chief Complaint   Patient presents with    Cough    Cold Symptoms     Hypoxic at the [facility] home, has been febrile and coughing.        History Obtained From:     Patient/EMR/Bedside RN    History of Present Illness:     Lanny Jaimes is a 69 y.o. Non- / non  female who presents with Cough and Cold Symptoms (Hypoxic at the [facility] home, has been febrile and coughing. )   and is admitted to the hospital for the management of Multifocal pneumonia.     Patient's medical history significant for cerebral amyloid angiopathy, frequent falls, major neurocognitive disorder, HTN, and acquired hypothyroidism.     Patient has dementia at baseline and is unable to provide any input into HPI, other than stating she has not been feeling well.  According to ED provider, patient presented from ECF per EMS after she was noted to be not acting her usual self.  She was noted to be hypoxic with SpO2 in the 80s.  EMS applied nonrebreather and transported to ED. review of epic indicates the patient was recently discharged from ARU after receiving rehabilitative services for CVA.     CXR obtained in ED shows new patchy opacities in the right mid to lower lung more than the left lower lung.  Features could represent pneumonia in the correct clinical setting. Borderline size of the heart and central vasculature and could true mild pulmonary vascular congestion.  Rapid swab positive for COVID-19.  
    [facility] PULMONARY,CRITICAL CARE & SLEEP   Petros Aaron MD/Lucio ROSARIO AGAP-BC, NP-C      Shahnaz ROSARIO NP-C    Jagdish ROSARIO NP-C                                         Pulmonary Progress Note    Patient - Lanny Jaimes   Age - 69 y.o.   - 1955  MRN - [id number]  [id number]  Date of Admission - 2025  3:13 AM    Consulting Service/Physician:       Primary Care Physician: Juan Antonio Barreto MD    SUBJECTIVE:     Chief Complaint:   Chief Complaint   Patient presents with    Cough    Cold Symptoms     Hypoxic at the group home home, has been febrile and coughing.      Subjective:    She is awake, sitting up in bed, not in distress, she denies any cough.  She is on 1 L of oxygen.  She has not had any fevers.    VITALS  /69   Pulse 71   Temp 97.6 °F (36.4 °C) (Oral)   Resp 18   Ht 1.702 m (5' 7\")   Wt 73.7 kg (162 lb 7.7 oz)   SpO2 97%   BMI 25.45 kg/m²   Wt Readings from Last 3 Encounters:   25 73.7 kg (162 lb 7.7 oz)   25 67.6 kg (149 lb)   25 67.6 kg (149 lb)     I/O (24 Hours)    Intake/Output Summary (Last 24 hours) at 2025 1301  Last data filed at 2025 1058  Gross per 24 hour   Intake 450 ml   Output 250 ml   Net 200 ml     Ventilator:   Settings  FiO2 : 40 %  Exam:   Physical Exam   Constitutional:  Oriented to person, sitting up in bed, no apparent distress  HENT: Unremarkable, nasal cannula in place  Head: Normocephalic and atraumatic.   Eyes: EOM are normal. Pupils are equal, round, and reactive to light.   Neck: Neck supple.   Cardiovascular:  Regular rate and rhythm.  Normal heart tones.  No JVD.    Pulmonary/Chest: Lung sounds few scattered rhonchi, respirations easy on 1 L  Abdominal: Deferred  Musculoskeletal: Generalized weakness, very debilitated and weak  Neurological:patient is alert and oriented to person, place, and time.   Skin: Skin is warm and dry.   Extremities: No edema 
    [facility] PULMONARY,CRITICAL CARE & SLEEP   Petros Aaron MD/Lucio ROSARIO AGARUBINAP-BC, NP-C      Shahnaz ROSARIO NP-C    Jagdish ROSARIO NP-C                                         Pulmonary Progress Note    Patient - Lanny Jaimes   Age - 69 y.o.   - 1955  MRN - [id number]  [id number]  Date of Admission - 2025  3:13 AM    Consulting Service/Physician:       Primary Care Physician: Juan Antonio Barreto MD    SUBJECTIVE:     Chief Complaint:   Chief Complaint   Patient presents with    Cough    Cold Symptoms     Hypoxic at the senior living home, has been febrile and coughing.      Subjective:    She did have an aspiration event yesterday during her swallow eval.  Currently seems to be doing relatively well.  She was transferred up to the floor from ICU.  She has not had any fevers.  She tells me she is fine.  She denies any cough.  She is weaned down to 1 L of oxygen.  She is working with PT currently, and is very weak    VITALS  /64   Pulse 61   Temp 97.5 °F (36.4 °C) (Oral)   Resp 18   Ht 1.702 m (5' 7\")   Wt 73.7 kg (162 lb 7.7 oz)   SpO2 95%   BMI 25.45 kg/m²   Wt Readings from Last 3 Encounters:   25 73.7 kg (162 lb 7.7 oz)   25 67.6 kg (149 lb)   25 67.6 kg (149 lb)     I/O (24 Hours)    Intake/Output Summary (Last 24 hours) at 2025 1226  Last data filed at 2025 0948  Gross per 24 hour   Intake 400 ml   Output 675 ml   Net -275 ml     Ventilator:   Settings  FiO2 : 40 %  Exam:   Physical Exam   Constitutional:  Oriented to person, place, seen with physical therapy, not in distress but very weak  HENT: Unremarkable, nasal cannula in place  Head: Normocephalic and atraumatic.   Eyes: EOM are normal. Pupils are equal, round, and reactive to light.   Neck: Neck supple.   Cardiovascular:  Regular rate and rhythm.  Normal heart tones.  No JVD.    Pulmonary/Chest: Lung sounds clear into them.  Diminished in bases, 
    [facility] PULMONARY,CRITICAL CARE & SLEEP   Petros Aaron MD/Lucio ROSARIO AGAP-BC, NP-C      Shahnaz ROSARIO NP-C    Jagdish ROSARIO NP-C                                         Pulmonary Progress Note    Patient - Lanny Jaimes   Age - 69 y.o.   - 1955  MRN - [id number]  [id number]  Date of Admission - 2025  3:13 AM    Consulting Service/Physician:       Primary Care Physician: Juan Antonio Barreto MD    SUBJECTIVE:     Chief Complaint:   Chief Complaint   Patient presents with    Cough    Cold Symptoms     Hypoxic at the group home home, has been febrile and coughing.      Subjective:    Patient resting in bed, tells me she continues to feel well without any complaints of dyspnea, cough, wheeze  For some reason remains on 1 L with SpO2 97%    VITALS  BP (!) 133/58   Pulse 66   Temp 98.2 °F (36.8 °C) (Axillary)   Resp 18   Ht 1.702 m (5' 7.01\")   Wt 73.7 kg (162 lb 7.7 oz)   SpO2 97%   BMI 25.44 kg/m²   Wt Readings from Last 3 Encounters:   25 73.7 kg (162 lb 7.7 oz)   25 67.6 kg (149 lb)   25 67.6 kg (149 lb)     I/O (24 Hours)    Intake/Output Summary (Last 24 hours) at 2025 1524  Last data filed at 2025 0830  Gross per 24 hour   Intake 120 ml   Output 1450 ml   Net -1330 ml     Ventilator:   Settings  FiO2 : 40 %  Exam:   Physical Exam   Constitutional: 1 L nasal cannula, no acute distress, resting in bed  Head: Normocephalic and atraumatic.   Eyes: EOM are normal. Pupils are equal, round, and reactive to light.   Neck: Neck supple.   Cardiovascular:  Regular rate and rhythm.  Normal heart tones.  No JVD.    Pulmonary/Chest: Even and unlabored respirations, anteriorly diminished without adventitious sounds today  Musculoskeletal: Generalized weakness, very debilitated and weak  Neurological:patient is alert and oriented to person, place, and time.   Skin: Skin is warm and dry.   Extremities: No edema 
    [facility] Internal Medicine  Miles Lundberg MD; Chad Gordon MD; Uriel Elliott MD; MD Nely Villa MD; Bonita Argueta MD  [facility] Internal Medicine   IN-PATIENT SERVICE  [facility]                 Date:   2/17/2025  Patientname:  Lanny Jaimes  Date of admission:  2/17/2025  3:13 AM  MRN:   [id number]  Account:  [id number]  YOB: 1955  PCP:    Juan Antonio Barreto MD  Room:   2090/2090-01  Code Status:    Full Code      Chief Complaint:     Chief Complaint   Patient presents with    Cough    Cold Symptoms     Hypoxic at the snf home, has been febrile and coughing.        History of Present Illness:     Lanny Jaimes is a 69 y.o. Non- / non  female who presents with Cough and Cold Symptoms (Hypoxic at the snf home, has been febrile and coughing. )   and is admitted to the hospital for the management of Multifocal pneumonia.    Patient's medical history significant for cerebral amyloid angiopathy, frequent falls, major neurocognitive disorder, HTN, and acquired hypothyroidism.    Patient has dementia at baseline and is unable to provide any input into HPI, other than stating she has not been feeling well.  According to ED provider, patient presented from ECF per EMS after she was noted to be not acting her usual self.  She was noted to be hypoxic with SpO2 in the 80s.  EMS applied nonrebreather and transported to ED. review of epic indicates the patient was recently discharged from ARU after receiving rehabilitative services for CVA.    CXR obtained in ED shows new patchy opacities in the right mid to lower lung more than the left lower lung.  Features could represent pneumonia in the correct clinical setting. Borderline size of the heart and central vasculature and could true mild pulmonary vascular congestion.  Rapid swab positive for COVID-19.  D-dimer elevated at 8.52.  CT chest showed no evidence of pulmonary arterial embolism. 
    [facility] PULMONARY,CRITICAL CARE & SLEEP   Petros Aaron MD/Lucio ROSARIO AGARUBINAP-BC, NP-C      Shahnaz ROSARIO NP-C    Jagdish ROSARIO NP-C                                         Pulmonary Progress Note    Patient - Lanny Jaimes   Age - 69 y.o.   - 1955  MRN - [id number]  [id number]  Date of Admission - 2025  3:13 AM    Consulting Service/Physician:       Primary Care Physician: Juan Antonio Barreto MD    SUBJECTIVE:     Chief Complaint:   Chief Complaint   Patient presents with    Cough    Cold Symptoms     Hypoxic at the shelter home, has been febrile and coughing.      Subjective:    Chelly is resting in bed  She tells me she feels her breathing is \"good\"  Cherri denies any worsening dyspnea, cough, wheeze  She remains on 1 L nasal cannula    Patient is aware she is likely going to LTAC and that we will follow her along if she is admitted to [facility]    VITALS  BP (!) 119/53   Pulse 65   Temp 98.6 °F (37 °C) (Oral)   Resp 16   Ht 1.702 m (5' 7.01\")   Wt 73.7 kg (162 lb 7.7 oz)   SpO2 96%   BMI 25.44 kg/m²   Wt Readings from Last 3 Encounters:   25 73.7 kg (162 lb 7.7 oz)   25 67.6 kg (149 lb)   25 67.6 kg (149 lb)     I/O (24 Hours)    Intake/Output Summary (Last 24 hours) at 2025 1424  Last data filed at 2025 0557  Gross per 24 hour   Intake --   Output 500 ml   Net -500 ml     Ventilator:   Settings  FiO2 : 40 %  Exam:   Physical Exam   Constitutional: 1 L nasal cannula, no acute distress, resting in bed  Head: Normocephalic and atraumatic.   Eyes: EOM are normal. Pupils are equal, round, and reactive to light.   Neck: Neck supple.   Cardiovascular:  Regular rate and rhythm.  Normal heart tones.  No JVD.    Pulmonary/Chest: Even and unlabored respirations, anteriorly diminished with very mild scattered rhonchi  Musculoskeletal: Generalized weakness, very debilitated and weak  Neurological:patient is 
  [facility] PULMONARY,CRITICAL CARE & SLEEP   Petrosrussell Aaron MD/Lucio CARRASQUILLOP-BC, NP-C       Shahnaz ROSARIO NP-C      Jagdish ROSARIO NP-C                                  Pulmonary Critical Care Progress Note    Patient - Lanny Jaimes   Age - 69 y.o.   - 1955  MRN - [id number]  Acct # - [id number]  Date of Admission - 2025  3:13 AM    Consulting Service/Physician:       Primary Care Physician: Juan Antonio Barreto MD    SUBJECTIVE:     Chief Complaint:   Chief Complaint   Patient presents with    Cough    Cold Symptoms     Hypoxic at the alf home, has been febrile and coughing.    Hypoxia/COVID  Subjective:    Patient seen from the hallway currently doing well on nasal cannula oxygen.  Spoke with nursing staff with no concerns    VITALS  BP (!) 141/76   Pulse 60   Temp 98.2 °F (36.8 °C) (Oral)   Resp 16   Ht 1.702 m (5' 7\")   Wt 73.7 kg (162 lb 7.7 oz)   SpO2 99%   BMI 25.45 kg/m²   Wt Readings from Last 3 Encounters:   25 73.7 kg (162 lb 7.7 oz)   25 67.6 kg (149 lb)   25 67.6 kg (149 lb)     I/O (24 Hours)    Intake/Output Summary (Last 24 hours) at 2025 0648  Last data filed at 2025 0553  Gross per 24 hour   Intake 851.05 ml   Output 2400 ml   Net -1548.95 ml     Ventilator:   Settings  FiO2 : 40 %  Exam:   Physical Exam   Patient seen at bedside currently breathing fine and actually no distress just waiting for a bed out of the ICU.  Transfer orders already in  Infusions:      sodium chloride       Meds:     Current Facility-Administered Medications:     linezolid (ZYVOX) IVPB 600 mg, 600 mg, IntraVENous, Q12H, Brianna Alvarado MD, Stopped at 25 014    [COMPLETED] remdesivir 200 mg in sodium chloride 0.9 % 250 mL IVPB, 200 mg, IntraVENous, Once, Stopped at 25 0149 **FOLLOWED BY** remdesivir 100 mg in sodium chloride 0.9 % 250 mL IVPB, 100 mg, IntraVENous, Q24H, Brianna Alvarado MD, Stopped at 
  [facility] PULMONARY,CRITICAL CARE & SLEEP   Petrosrussell Aaron MD/Lucio ROSARIO AGARUBINAP-BC, NP-C       Shahnaz ROSARIO NP-C      Jagdish ROSARIO NP-C                                  Pulmonary Critical Care Progress Note    Patient - Lanny Jaimes   Age - 69 y.o.   - 1955  MRN - [id number]  Acct # - [id number]  Date of Admission - 2025  3:13 AM    Consulting Service/Physician:       Primary Care Physician: Juan Antonio Barreto MD    SUBJECTIVE:     Chief Complaint:   Chief Complaint   Patient presents with    Cough    Cold Symptoms     Hypoxic at the residential home, has been febrile and coughing.    Hypoxia/COVID  Subjective:    Patient much more awake and aware today compared to yesterday.  She is still disoriented but denies pain and tells me she is feeling better.  She is currently on 30 L / 40% FiO2 saturating 96%.  She is eating lunch without difficulty.  The nurse agrees that she is more interactive today.  The patient denies coughing.    VITALS  BP (!) 166/90   Pulse 70   Temp 98.7 °F (37.1 °C) (Oral)   Resp 16   Ht 1.702 m (5' 7\")   Wt 72.2 kg (159 lb 2.8 oz)   SpO2 92%   BMI 24.93 kg/m²   Wt Readings from Last 3 Encounters:   25 72.2 kg (159 lb 2.8 oz)   25 67.6 kg (149 lb)   25 67.6 kg (149 lb)     I/O (24 Hours)    Intake/Output Summary (Last 24 hours) at 2025 0646  Last data filed at 2025 0400  Gross per 24 hour   Intake --   Output 850 ml   Net -850 ml     Ventilator:   Settings  FiO2 : 50 %  Exam:   Physical Exam   Constitutional: Alert, cooperative, pleasantly confused, seems more interactive today  HENT: Unremarkable, high flow nasal cannula 30 L / 40% FiO2 saturating 96% which is an improvement from yesterday  Neck: Neck supple.   Cardiovascular:  Regular rate and rhythm.  Normal heart tones.  No JVD.    Pulmonary/Chest: Fine rales with scattered rhonchi over the right chest  Extremities: No edema or 
  [facility] PULMONARY,CRITICAL CARE & SLEEP   Petrosrussell Aaron MD/Lucio ROSARIO AGARUBINAP-BC, NP-C       Shahnaz ROSARIO NP-C      Jagdish ROSARIO NP-C                                  Pulmonary Critical Care Progress Note    Patient - Lanny Jaimes   Age - 69 y.o.   - 1955  MRN - [id number]  Acct # - [id number]  Date of Admission - 2025  3:13 AM    Consulting Service/Physician:       Primary Care Physician: Juan Antonio Barreto MD    SUBJECTIVE:     Chief Complaint:   Chief Complaint   Patient presents with    Cough    Cold Symptoms     Hypoxic at the senior care home, has been febrile and coughing.    Hypoxia/COVID  Subjective:    Patient denies pain or shortness of breath.  She denies cough.  She is resting comfortably on 30 L / 70% FiO2 heated high flow circuit.    She is pleasantly confused.  I asked if she had any children and she told me they are age 68 and 67.    VITALS  BP (!) 134/119   Pulse 77   Temp 98.7 °F (37.1 °C) (Oral)   Resp 23   Ht 1.702 m (5' 7\")   Wt 72.5 kg (159 lb 13.3 oz)   SpO2 (!) 89%   BMI 25.03 kg/m²   Wt Readings from Last 3 Encounters:   25 72.5 kg (159 lb 13.3 oz)   25 67.6 kg (149 lb)   25 67.6 kg (149 lb)     I/O (24 Hours)    Intake/Output Summary (Last 24 hours) at 2025 0705  Last data filed at 2025 1408  Gross per 24 hour   Intake 480 ml   Output --   Net 480 ml     Ventilator:   Settings  FiO2 : 70 %  Exam:   Physical Exam   Constitutional: Alert, cooperative, pleasantly confused  HENT: Unremarkable, high flow nasal cannula 30 L / 70% FiO2 saturating 96%  Neck: Neck supple.   Cardiovascular:  Regular rate and rhythm.  Normal heart tones.  No JVD.    Pulmonary/Chest: Fine rales with scattered rhonchi over the right chest  Extremities: No edema or discoloration  Infusions:      sodium chloride      sodium chloride 75 mL/hr at 25 0020     Meds:     Current Facility-Administered Medications:     
 RN verified DNR-CCA no intubation with  via telephone call with patient's son Sha. DNR-CCA  document placed in chart.  
AJ Carrion on unit, writer asked if PRN IV potassium can be ordered for a potassium of 3.3 d/t patient being NPO and coughing when swallowing.     See new PRN potassium order.  
[facility]   Physical Therapy Treatment  Date: 25  Patient Name: Lanny Jaimes       Room: /2121-01  MRN: [id number]  Account: [id number]   : 1955  (69 y.o.) Gender: female     Discharge Recommendations:  Discharge Recommendations: Patient would benefit from continued therapy after discharge          General  Chart Reviewed: Yes  Patient assessed for rehabilitation services?: Yes  Additional Pertinent Hx: COVID (+)  Response To Previous Treatment: Patient unable to report, no changes reported from family or staff  Family/Caregiver Present: No  Diagnosis: multifocal PNA  Follows Commands: Impaired    Past Medical History:  has a past medical history of Thyroid disease.  Past Surgical History:   has no past surgical history on file.    Restrictions  Restrictions/Precautions  Restrictions/Precautions: Contact Precautions, General Precautions, Fall Risk, Modified Diet, Bed Alarm (Droplet Plus Precautions)  Activity Level: Up with Assist  Required Braces or Orthoses?: No  Implants Present? :  (unknown)  Position Activity Restriction  Other Position/Activity Restrictions: O2 at 1L, telemetry, IV     Subjective  Subjective  Subjective: Pt is sitting in high fowlers in bed, family present. Pt is agreeable to get OOB with family encouragement. Pt continues to be confused and require increased time to process questions and commands.   General  General Comments: RN ok's session.     Pain  Pre-Pain: 0 (denies)  Post-Pain: 0 (no signs)     Objective  Orientation  Overall Orientation Status: Impaired  Orientation Level: Oriented to person, Disoriented to situation, Disoriented to time, Disoriented to place  Cognition  Overall Cognitive Status: Exceptions  Arousal/Alertness: Delayed responses to stimuli  Following Commands: Follows one step commands with increased time, Follows one step commands with repetition  Attention Span: Attends with cues to redirect  Memory: Impaired  Safety Judgement: 
Call from micro lab; 1 of 2 positive blood cultures. Gram positive cocci clusters, staph epi.   
[facility]   INPATIENT OCCUPATIONAL THERAPY  PROGRESS NOTE  Date: 2025  Patient Name: Lanny Jaimes       Room:   MRN: [id number]    : 1955  (69 y.o.)  Gender: female   Referring Practitioner: Consuelo Mooney APRN - CNP  Diagnosis: multifocal pneumonia; COVID +    Discharge Recommendations:  Further Occupational Therapy is recommended upon facility discharge.    OT Equipment Recommendations  Other: TBD    Restrictions/Precautions  Restrictions/Precautions  Restrictions/Precautions: Contact Precautions;General Precautions;Fall Risk;Modified Diet;Bed Alarm (Droplet+ COVID+)  Activity Level: Up with Assist  Required Braces or Orthoses?: No  Implants Present? :  (unknown)  Position Activity Restriction  Other Position/Activity Restrictions: O2 at 1L, telemetry, IV    SpO2: 93 %  O2 Device: None (Room air)    Subjective  Subjective  Subjective: \"I can try sitting up\" pt pleasant and agreeable to OT/PT tx  Pain  Pre-Pain: 0  Comments: Okay for co-tx with PTA Noelle per YOHAN Sheridan. Pt agreeable with encouragement however limited tolerance noted    Objective  Orientation  Overall Orientation Status: Impaired  Orientation Level: Oriented to person;Disoriented to situation;Disoriented to time;Disoriented to place  Cognition  Overall Cognitive Status: Exceptions  Arousal/Alertness: Delayed responses to stimuli  Following Commands: Follows one step commands with increased time;Follows one step commands with repetition  Attention Span: Attends with cues to redirect  Memory: Impaired  Safety Judgement: Decreased awareness of need for safety;Decreased awareness of need for assistance  Problem Solving: Decreased awareness of errors;Assistance required to correct errors made;Assistance required to identify errors made;Assistance required to implement solutions;Assistance required to generate solutions  Insights: Decreased awareness of deficits  Initiation: Requires cues for all  Sequencing: 
Dr. Talavera (ID) notified of new consult via [employer].   
Dr. Talavera notified of 1 out of 2 positive blood cultures.  
[facility]   Pharmacy Pharmacokinetic Monitoring Service - Vancomycin    Consulting Provider: Consuleo Mooney NP   Indication: PNA  Target Concentration: Goal AUC/ANGEL 400-600 mg*hr/L  Day of Therapy: 2  Additional Antimicrobials: azactam    Pertinent Laboratory Values:   Wt Readings from Last 1 Encounters:   02/17/25 68 kg (150 lb)     Temp Readings from Last 1 Encounters:   02/18/25 98.7 °F (37.1 °C) (Oral)     Estimated Creatinine Clearance: 87 mL/min (A) (based on SCr of 0.6 mg/dL (L)).  Recent Labs     02/17/25  0340 02/18/25  0433   CREATININE 0.8 0.6*   BUN 24* 21   WBC 12.4* 15.6*     Procalcitonin: 0.17    Pertinent Cultures:  Culture Date Source Results   See micro reports     MRSA Nasal Swab: was ordered by provider, awaiting results.    Recent vancomycin administrations                     vancomycin (VANCOCIN) 750 mg in sodium chloride 0.9 % 250 mL IVPB (Muax1Sdg) (mg) 750 mg New Bag 02/17/25 1951    vancomycin (VANCOCIN) 1,750 mg in sodium chloride 0.9 % 500 mL IVPB (mg) 1,750 mg New Bag 02/17/25 0639                    Assessment:  Date/Time Current Dose Concentration Timing of Concentration (h) AUC   02/18 750 mg q12h 12.1 0433 376   Note: Serum concentrations collected for AUC dosing may appear elevated if collected in close proximity to the dose administered, this is not necessarily an indication of toxicity    Plan:  Current dosing regimen is sub-therapeutic  Increase dose to 1000 mg q12h  Repeat vancomycin concentration ordered for 02/19/25 @ 0600   Pharmacy will continue to monitor patient and adjust therapy as indicated    Loading dose: N/A  Regimen: 1000 mg IV every 12 hours.  Start time: 07:51 on 02/18/2025  Exposure target: AUC24 (range)400-600 mg/L.hr   [id number]: 481 mg/L.hr  AUC24,ss: 511 mg/L.hr  Probability of AUC24 > 400: 93 %  Ctrough,ss: 13.8 mg/L  Probability of Ctrough,ss > 20: 7 %    Thank you for the consult,  Ibrahima Bucio FAB  2/18/2025 6:10 AM    
[facility]   Pharmacy Pharmacokinetic Monitoring Service - Vancomycin     Lanny Jaimes is a 69 y.o. female starting on vancomycin therapy for Multifocal Pneumonia. Pharmacy consulted by Consuelo Mooney NP for monitoring and adjustment.    Target Concentration: Goal AUC/ANGEL 400-600 mg*hr/L    Additional Antimicrobials: Aztreonam    Pertinent Laboratory Values:   Wt Readings from Last 1 Encounters:   02/17/25 68 kg (150 lb)     Temp Readings from Last 1 Encounters:   02/17/25 98.6 °F (37 °C) (Oral)     Estimated Creatinine Clearance: 65 mL/min (based on SCr of 0.8 mg/dL).  Recent Labs     02/17/25  0340   CREATININE 0.8   BUN 24*   WBC 12.4*     Procalcitonin: 0.17    Pertinent Cultures:  See Micro  MRSA Nasal Swab: was ordered by provider, awaiting results.    Loading dose: 1750 mg at 06:39 02/17/2025.  Regimen: 750 mg IV every 12 hours.  Start time: 18:39 on 02/17/2025  Exposure target: AUC24 (range)400-600 mg/L.hr   DQO00-51: 447 mg/L.hr  AUC24,ss: 491 mg/L.hr  Probability of AUC24 > 400: 80 %  Ctrough,ss: 14.5 mg/L  Probability of Ctrough,ss > 20: 15 %    Plan:  Dosing recommendations based on Bayesian software  Start vancomycin 1750 mg loading dose followed by 750 mg Q12H  Anticipated AUC of 491 and trough concentration of 14.5 at steady state  Renal labs as indicated   Vancomycin concentration ordered for 02/18 @ 0600   Pharmacy will continue to monitor patient and adjust therapy as indicated    Thank you for the consult,  Girish Roblero RPH  2/17/2025 8:10 AM   
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    Recent Chest Xray/CT of Chest: (  Date CXR )Consolidative opacities in the right upper and right lower lobes consistent  with multifocal pneumonia or aspiration pneumonitis.    Date of Eval: 2025  Evaluating Therapist: VERENICE Hilario    Current Diet level:  Current Diet : NPO  Current Liquid Diet : NPO    Primary Complaint   Per IM H&P: Lanny Jaimes is a 69 y.o. Non- / non  female who presents with Cough and Cold Symptoms (Hypoxic at the snf home, has been febrile and coughing. )   and is admitted to the hospital for the management of Multifocal pneumonia.     Patient's medical history significant for cerebral amyloid angiopathy, frequent falls, major neurocognitive disorder, HTN, and acquired hypothyroidism.     Patient has dementia at baseline and is unable to provide any input into HPI, other than stating she has not been feeling well.  According to ED provider, patient presented from [facility] per EMS after she was noted to be not acting her usual self.  She was noted to be hypoxic with SpO2 in the 80s.  EMS applied nonrebreather and transported to ED. review of epic indicates the patient was recently discharged from ARU after receiving rehabilitative services for CVA.     CXR obtained in ED shows new patchy opacities in the right mid to lower lung more than the left lower lung.  Features could represent pneumonia in the correct clinical setting. 
I agree with Kaleb student nurse charting.   
[facility]   Physical Therapy Treatment  Date: 25  Patient Name: Lanny Jaimes       Room: /2121-01  MRN: [id number]  Account: [id number]   : 1955  (69 y.o.) Gender: female     Discharge Recommendations:  Discharge Recommendations: Patient would benefit from continued therapy after discharge          General  Chart Reviewed: Yes  Patient assessed for rehabilitation services?: Yes  Additional Pertinent Hx: COVID (+)  Response To Previous Treatment: Patient unable to report, no changes reported from family or staff  Family/Caregiver Present: No  Diagnosis: multifocal PNA  Follows Commands: Within Functional Limits    Past Medical History:  has a past medical history of Thyroid disease.  Past Surgical History:   has no past surgical history on file.    Restrictions  Restrictions/Precautions  Restrictions/Precautions: Contact Precautions, General Precautions, Fall Risk, Modified Diet, Bed Alarm  Activity Level: Up with Assist  Required Braces or Orthoses?: No  Implants Present? :  (unknown)  Position Activity Restriction  Other Position/Activity Restrictions: O2 at 1L, telemetry     Subjective  Subjective  Subjective: Pt reports no pain at this time. Upon asking if the pt \"feels better\", pt states \"feels better\". Pt is pleasantly confused. After moving the covers, writer discovered that pt had liquid diarrhea with a pure wick in place. IV also beeping with air-in-line. Pressed Nurse call button for assist. PCA Genia arrived and assisted with pt clean up as well as notifying RN.   General  General Comments: Pt is sidelying on left side upon arrival. RN Temitope reports no changes in medical condition.           Objective  Orientation  Overall Orientation Status: Impaired  Orientation Level: Oriented to person, Disoriented to place, Disoriented to time, Disoriented to situation  Cognition  Following Commands: Follows one step commands with increased time, Follows one step commands with 
[facility]   INPATIENT OCCUPATIONAL THERAPY  PROGRESS NOTE  Date: 2025  Patient Name: Lanny Jaimes       Room: -  MRN: [id number]    : 1955  (69 y.o.)  Gender: female   Referring Practitioner: Consuelo Mooney APRN - CNP  Diagnosis: multifocal pneumonia; COVID +    Discharge Recommendations:  Further Occupational Therapy is recommended upon facility discharge.    OT Equipment Recommendations  Other: TBD    Restrictions/Precautions  Restrictions/Precautions  Restrictions/Precautions: Contact Precautions;General Precautions;Fall Risk;Modified Diet;Bed Alarm (Droplet+ COVID+)  Activity Level: Up with Assist  Required Braces or Orthoses?: No  Implants Present? :  (unknown)  Position Activity Restriction  Other Position/Activity Restrictions: O2 at 1L, telemetry, IV    SpO2: 93 %  O2 Device: Nasal cannula    Subjective  Subjective  Subjective: \"I feel like crap.  Everything hurts. Life sucks.\"  Pain  Pre-Pain:  (Pt reports \"everything hurts\" but does not quantify or give any other description; No pain behavior noted during mobility)  Comments: Pt minimally cooperative with therapy with encouragement.    Objective  Orientation  Overall Orientation Status: Impaired  Orientation Level: Oriented to person;Disoriented to situation;Disoriented to time;Disoriented to place  Cognition  Overall Cognitive Status: Exceptions  Arousal/Alertness: Delayed responses to stimuli  Following Commands: Follows one step commands with increased time;Follows one step commands with repetition  Attention Span: Attends with cues to redirect  Memory: Impaired  Safety Judgement: Decreased awareness of need for safety;Decreased awareness of need for assistance  Problem Solving: Decreased awareness of errors;Assistance required to correct errors made;Assistance required to identify errors made;Assistance required to implement solutions;Assistance required to generate solutions  Insights: Decreased awareness 
[facility]   INPATIENT OCCUPATIONAL THERAPY  PROGRESS NOTE  Date: 2025  Patient Name: Lanny Jaimes       Room: -01  MRN: [id number]    : 1955  (69 y.o.)  Gender: female   Referring Practitioner: Consuelo Mooney APRN - CNP  Diagnosis: multifocal pneumonia      Discharge Recommendations:  Further Occupational Therapy is recommended upon facility discharge.      OT Equipment Recommendations  Other: TBD    Restrictions/Precautions  Restrictions/Precautions  Restrictions/Precautions: Contact Precautions;General Precautions;Fall Risk;Modified Diet;Bed Alarm (droplet plus precautions)  Activity Level: Up with Assist  Required Braces or Orthoses?: No  Implants Present? :  (unknown)    SpO2: 93 %  O2 Device: Nasal cannula    Subjective  Subjective  Subjective: Pt agreeable to engage in OT/PT session. Completed with PT Denice  Pain  Pre-Pain:  (pt did not comment on pain throughout session)  Comments: Ok per YOHAN Hanson for OT/PT    Objective  Orientation  Overall Orientation Status: Impaired  Orientation Level: Oriented to person;Disoriented to situation;Disoriented to time;Disoriented to place  Cognition  Overall Cognitive Status: Exceptions  Arousal/Alertness: Delayed responses to stimuli  Following Commands: Follows one step commands with increased time;Follows one step commands with repetition  Attention Span: Attends with cues to redirect  Memory: Impaired  Safety Judgement: Decreased awareness of need for safety;Decreased awareness of need for assistance  Problem Solving: Decreased awareness of errors;Assistance required to correct errors made;Assistance required to identify errors made;Assistance required to implement solutions;Assistance required to generate solutions  Insights: Decreased awareness of deficits  Initiation: Requires cues for all  Sequencing: Requires cues for all  Cognition Comment: delayed responses at times. [place] assisst to initiate and sequence 
Patient has not felt good all day so she just tried to eat dinner. She coughed and was choking immediately. Food was taken away and O2 sensor was reading 75% on 4 L . Writer attempted to suction her and called RT. We had to increase her O2 to a non rebreather mask at 15 L. Currently 96% on 15L non rebreather. Writer updated Dr. Carter who ordered stat ABGs and to transfer her to ICU.   
Patient transferred to ICU 2008 on monitor. Report given.   
Physical Therapy  DATE: 2025    NAME: Lanny Jaimes  MRN: [id number]   : 1955    Patient not seen this date for Physical Therapy due to:      [] Cancel by RN or physician due to:    [] Hemodialysis    [] Critical Lab Value Level     [] Blood transfusion in progress    [] Acute or unstable cardiovascular status   _MAP < 55 or more than >115  _HR < 40 or > 130    [] Acute or unstable pulmonary status   -FiO2 > 60%   _RR < 5 or >40    _O2 sats < 85%    [] Strict Bedrest    [] Off Unit for surgery or procedure    [] Off Unit for testing       [] Pending imaging to R/O fracture    [x] Refusal by Patient; checked on pt @ 8012-7840. pt repeatedly stating \"My needs are met\" when this writer and OTR introduced self, encouraged participation in therapy. Pt states \"it's not my thing.\" This writer attempted to edu pt on importance of working with therapy and getting out of bed however pt refuses. PT will continue to follow and check pt status tomorrow.       [] Other      [] PT being discontinued at this time. Patient independent. No further needs.     [] PT being discontinued at this time as the patient has been transferred to hospice care. No further needs.      Electronically signed by Noelle Flowers PTA on 25 at 3:16 PM EST     
Physical Therapy  [facility]   Physical Therapy Treatment  Date: 25  Patient Name: Lanny Jaimes       Room:   MRN: [id number]  Account: [id number]   : 1955  (69 y.o.) Gender: female     Discharge Recommendations:  Discharge Recommendations: Patient would benefit from continued therapy after discharge          General  Chart Reviewed: Yes  Patient assessed for rehabilitation services?: Yes  Additional Pertinent Hx: COVID (+)  Response To Previous Treatment: Patient unable to report, no changes reported from family or staff  Family/Caregiver Present: No  Diagnosis: multifocal PNA  Follows Commands: Impaired    Past Medical History:  has a past medical history of Thyroid disease.  Past Surgical History:   has no past surgical history on file.    Restrictions  Restrictions/Precautions  Restrictions/Precautions: Contact Precautions, General Precautions, Fall Risk, Modified Diet, Bed Alarm (Droplet+ COVID+)  Activity Level: Up with Assist  Required Braces or Orthoses?: No  Implants Present? :  (unknown)  Position Activity Restriction  Other Position/Activity Restrictions: O2 at 1L, telemetry, IV     Subjective  Subjective  Subjective: Pt lying in bed upon arrival, agreeable to therapy   General  General Comments: YOHAN Sheridan approved therapy; co-treat with RUBEN Gomez     Pain  Pre-Pain: 0     Objective  Orientation  Overall Orientation Status: Impaired    Bed Mobility  Bed mobility  Rolling to Left: Minimal assistance  Rolling to Right: Minimal assistance  Supine to Sit: Substantial/Maximal assistance, 2 Person assistance  Sit to Supine: Partial/Moderate assistance  Scooting: Dependent/Total, 2 Person assistance  Bed Mobility Comments: Increased doreen and encouragement required to comeplete     Exercises completed:    Exercise 1: bed mobility x2  Exercise 2: rolling L/R multiple times for brief and linen change and pericare   Exercise 3: seated EOB ~ 5 minutes SBA while eating 
Plan:  Start baricitinib 4 mg daily x 14 days or discharge, whatever comes first.  The Pharmacist should review the patient information to make sure criteria for use is met prior to dispensing.    If the documentation does not support the criteria, the pharmacist should call the MD to verify the criteria is met.  It is preferred that the provider is ID or pulmonary specialist, however not restricted to these providers if criteria met.     Baricitinib   Baricitinib is a Janus Kinase (GERMAN) inhibitor. GERMAN enzymes are intracellular enzymes involved in stimulating immune cell function and hematopoiesis through signaling pathways. Drugs like baricitinib block these pathways. It has been granted emergency use authorization by the FDA to treat COVID-19 in hospitalized patients without concomitant remdesivir therapy.            [facility] Baricitinib Criteria for Use     Criteria Patient requiring respiratory support with elevated biomarkers for inflammation   Age Patients >= 2 and older (see dosing)   Clinical Status Confirmed COVID-19 (+) and hospitalized  Requiring supplemental oxygen  On high flow nasal cannula, invasive or non-invasive ventilation, ECMO  Elevated inflammatory markers (ANY ELEVATION) in CRP, D- dimer, LDH, or ferritin. (CRP can be above or below 7.5 mg/dL)   Concomitant Therapy Dexamethasone 6-20 mg IV/PO daily (or equivalent steroid)  Tocilizumab: Do not give if patient has already received tocilizumab for COVID-19 treatment due to long half-life of tocilizumab (16 days)   Oxygen Saturation Specific O2 saturation not determinant for baricitinib use   Pertinent Lab Results Positive COVID-19  Any of the following elevated:  CRP  D-Dimer  LDH  Ferritin   Special Considerations (clarify risk/benefit with prescriber) Pregnancy  Severe hepatic impairment  Chronic/recurrent infections  Hemoglobin <8.0 g/dL  Chronically immunosuppressed patients (drug or disease etiology)   Serious Side Effects Venous Thrombosis 
Pt admitted to room vitals obtained mick mcmahoned up call light within reach pt resting   
Pt discharged at this time pt transported to [facility] via [facility] report called to [facility] nurse Elysia at 1305 all questions all pt belongings sent with pt   
Pt received in ICU room 2008. Telemetry monitor applied and vitals assessed. Standard safety measures in place. Assessment by primary RN to follow.  
Pt tolerated pills with apple sauce well.   
Remdesivir (RDV) Initiation  The remdesivir \"benefit\" suggested by data is to shorten length of illness.  Mortality benefit has not been shown. It also seems to be most beneficial early in the disease course (presumed \"viral phase\")     Does the patient meet all of the inclusion criteria and none of the exclusion criteria for receiving remdesivir (see below)?Yes.  Has ID been consulted and recommended initiation of remdesivir? Yes.  If ID has not been consulted, verify that this patient is appropriate and meets all below.  If there are criteria not met, please contact ID to at least sign off on the order (an official consult is not necessary).  Has a daily CMP (or LFTs) been ordered? Yes.  If either are not ordered, the pharmacist should enter CMP daily (per pharmacy practice) under the physician authorizing remdesivir.     Please keep this Ivent open for the duration of remdesivir treatment and monitor daily for remdesivir-related adverse events.  All serious adverse events must be reported at www.fda.gov/medwatch/report.htm within 7 days of the event.    Criteria **All criteria need to be met to receive Remdesivir for COVID-19 patients**   Laboratory Results Confirmed positive COVID-19 PCR or Antigen test   Age Adults  Children (> 3.5 kg)   Clinical Status Within 7 days of symptom onset (< 7 days)   Oxygen Status Requiring supplemental oxygen       Contraindications/ Exclusion Criteria - Present at initiation of therapy Requiring invasive or non-invasive mechanical ventilation ** (including use in patients requiring high-flow oxygen early in the disease state)  Use of more than 1 vasopressor agent prior to the start of remdesivir  Already improving on current treatment/supportive regimen as evidenced by improving oxygenation, and/or impending discharge  Patients in whom the clinical team think death is in the immediate short-term whereby administration of RDV unlikely to change clinical outcome       Special 
Report called to YOHAN To on Med Surg. Patient has all belongings and will be transferred over via bed.   
SPEECH LANGUAGE PATHOLOGY  Speech Language Pathology  ST PROGRESSIVE CARE    Dysphagia Treatment Note    Date: 2/21/2025  Patient’s Name: Lanny Jaimes  MRN: [id number]  Diagnosis: Dysphagia  Patient Active Problem List   Diagnosis Code    Focal hemorrhagic contusion of cerebrum (HCC) S06.33AA    Acute intracranial hemorrhage (HCC) I62.9    Cerebral amyloid angiopathy (HCC) E85.4, I68.0    Acquired hypothyroidism E03.9    Primary hypertension I10    Aneurysm of cavernous portion of left internal carotid artery I67.1    Hypertensive urgency I16.0    Encephalopathy G93.40    Major neurocognitive disorder (HCC) F03.90    Frequent falls R29.6    Multifocal pneumonia J18.9    COVID-19 U07.1    Bandemia D72.825    CRP elevated R79.82    Oral phase dysphagia R13.11       Pain Rating: no c/o pain  Pain Location: N/A  Non-Pharmaceutical Pain Intervention: N/A    Dysphagia Treatment  Treatment time:    SLP Individual Minutes  Time In: 1304  Time Out: 1322  Minutes: 18     Subjective: [x] Alert [] Cooperative     [] Confused     [] Agitated    [] Lethargic    Current Diet:  Solids: Easy to Chew (IDDSI Level 7)   Liquids: Mildly Thick (Nectar) (IDDSI Level 2)     Objective/Assessment:    Pt seen for dysphagia management.  ST instructed Pt in oral/pharyngeal/laryngeal exercises (OMEX) to strengthen swallowing mechanism. Pt required max cues to attend to task and attempt OMEX. Pt completed lingual protrusion/retraction x 20 reps and attempted Awilda swallow x 3. Pt with difficulty completing further exercises due to poor attention. Pt consumed mildly thick liquids via cup with no overt s/s aspiration. ST educated Pt re: use of safe swallow strategies (upright position, rate/bolus moderation) however Pt will require reinforcement due to impaired cognition.       Plan:  [x] Continue ST services    [] Discharge from ST:        Discharge recommendations:  [x] Further therapy recommended at discharge.   [] No therapy recommended at 
SPEECH LANGUAGE PATHOLOGY  Speech Language Pathology  ST PROGRESSIVE CARE    Dysphagia Treatment Note    Date: 2/25/2025  Patient’s Name: Lanny Jaimes  MRN: [id number]  Diagnosis: Dysphagia  Patient Active Problem List   Diagnosis Code    Focal hemorrhagic contusion of cerebrum (HCC) S06.33AA    Acute intracranial hemorrhage (HCC) I62.9    Cerebral amyloid angiopathy (HCC) E85.4, I68.0    Acquired hypothyroidism E03.9    Primary hypertension I10    Aneurysm of cavernous portion of left internal carotid artery I67.1    Hypertensive urgency I16.0    Encephalopathy G93.40    Major neurocognitive disorder (HCC) F03.90    Frequent falls R29.6    Multifocal pneumonia J18.9    COVID-19 U07.1    Bandemia D72.825    CRP elevated R79.82    Oral phase dysphagia R13.11    Leukocytosis D72.829       Pain Rating: no c/o pain  Pain Location: N/A  Non-Pharmaceutical Pain Intervention: N/A    Dysphagia Treatment  Treatment time:    SLP Individual Minutes  Time In: 0915  Time Out: 0929  Minutes: 14     Subjective: [x] Alert [] Cooperative     [] Confused     [] Agitated    [] Lethargic    Current Diet:  Solids: Easy to Chew (IDDSI Level 7)   Liquids: Mildly Thick (Nectar) (IDDSI Level 2)     Objective/Assessment:    Pt seen for dysphagia management.  ST instructed Pt in oral/pharyngeal/laryngeal exercises (OMEX) to strengthen swallowing mechanism. Pt required max cues to attend to task and complete OMEX x 20 reps x 2 sets. Pt stated she was unable to do Awilda swallow; \"it's too hard.\" Replaced with effortful swallow x 2 x 5 sets. Pt consumed mildly thick liquids via straw with no overt s/s aspiration. ST educated Pt re: benefits of OMEX to improve swallowing function.     Plan:  [x] Continue ST services    [] Discharge from ST:        Discharge recommendations:  [x] Further therapy recommended at discharge.   [] No therapy recommended at discharge.         Treatment completed by: Bassam Nina M.S., CCC-SLP   
Speech Language Pathology  [facility] ICU    Date: 2/19/2025  Patient Name: Lanny Jaimes  YOB: 1955   AGE: 69 y.o.  MRN: [id number]        Patient Not Available for Speech Therapy     Due to:  [] Testing  [] Hemodialysis  [] Cancelled by RN  [] Surgery   [] Intubation/Sedation/Pain Medication  [] Medical instability  [] Refusal  [x] Other: Pt. Remains on heated high flow 02 per RN. RN states respiratory is going to trial weaning soon. Will continue to follow.    Completed by: Mague Arvizu M.S. CF-SLP      
Speech Language Pathology  [facility] MED SURG    Dysphagia Treatment Note    Date: 2/26/2025  Patient’s Name: Lanny Jaimes  MRN: [id number]  Diagnosis:   Patient Active Problem List   Diagnosis Code    Focal hemorrhagic contusion of cerebrum (HCC) S06.33AA    Acute intracranial hemorrhage (HCC) I62.9    Cerebral amyloid angiopathy (HCC) E85.4, I68.0    Acquired hypothyroidism E03.9    Primary hypertension I10    Aneurysm of cavernous portion of left internal carotid artery I67.1    Hypertensive urgency I16.0    Encephalopathy G93.40    Major neurocognitive disorder (HCC) F03.90    Frequent falls R29.6    Multifocal pneumonia J18.9    COVID-19 U07.1    Bandemia D72.825    CRP elevated R79.82    Oral phase dysphagia R13.11    Leukocytosis D72.829       Pain Rating: None reported    Dysphagia Treatment  Treatment time:    SLP Individual Minutes  Time In: 1317  Time Out: 1335  Minutes: 18     Subjective: [x] Alert [x] Cooperative     [] Confused     [] Agitated    [] Lethargic    Current Diet:  Solids: Easy to Chew (IDDSI Level 7)  Liquids: Mildly Thick (Nectar) (IDDSI Level 2)    Objective/Assessment:    Pt. Seen for O/M treatment program. Pt. Asleep upon ST arrival. Able to be easily aroused and engaged in conversation with ST. Pt. Extremely internally distracted. Perseverating on \"drama\" and repeatedly stating \"it's just too much for me\". Pt. Required very frequent redirections and very frequent repetition of stimuli for exercises.     Pt. Completed pharyngeal/BOT exercises X 15 reps X 3 sets with max verbal/visual cues, repetition of stimuli, or when given clinician model. Pt. Completed effortful swallow with weighted bolus x2. No overt s/s of aspiration/penetration with mildly thick liquids via straw.    Other: Call light left within reach at end of session with bed alarm activated.     Plan:  [x] Continue ST services    [] Discharge from ST:      Discharge recommendations:  [x] Further therapy recommended at discharge.   
Speech Language Pathology  [facility] PROGRESSIVE CARE    Dysphagia Treatment Note    Date: 2/24/2025  Patient’s Name: Lanny Jaimes  MRN: [id number]  Diagnosis:   Patient Active Problem List   Diagnosis Code    Focal hemorrhagic contusion of cerebrum (HCC) S06.33AA    Acute intracranial hemorrhage (HCC) I62.9    Cerebral amyloid angiopathy (HCC) E85.4, I68.0    Acquired hypothyroidism E03.9    Primary hypertension I10    Aneurysm of cavernous portion of left internal carotid artery I67.1    Hypertensive urgency I16.0    Encephalopathy G93.40    Major neurocognitive disorder (HCC) F03.90    Frequent falls R29.6    Multifocal pneumonia J18.9    COVID-19 U07.1    Bandemia D72.825    CRP elevated R79.82    Oral phase dysphagia R13.11    Leukocytosis D72.829       Pain Rating: None reported    Dysphagia Treatment  Treatment time:    SLP Individual Minutes  Time In: 1552  Time Out: 1603  Minutes: 11     Subjective: [x] Alert [x] Cooperative     [] Confused     [] Agitated    [] Lethargic    Current Diet:  Solids: Easy to Chew (IDDSI Level 7)  Liquids: Mildly Thick (Nectar) (IDDSI Level 2)    Objective/Assessment:    Pt. Seen for dysphagia treatment program.  Pt. Completed simple tongue base strength exercises x5, 10 reps each c MAX cues. Pt. Unable to complete  effortful swallow despite max cues/several repetitions of task instructions. Pt. Demo limited sustained attention d/t internal and external distractions. ST modified environment to decrease external distractions.    Pt. Observed with mildly thick liquids via straw. No overt s/s of aspiration/penetration noted. ST encouraged pt. To complete exercises  t/o the day. Pt. Verbalized understanding, however suspect poor carryover d/t impaired cognition.     Plan:  [x] Continue ST services    [] Discharge from ST:      Discharge recommendations:  [x] Further therapy recommended at discharge.   [] No therapy recommended at discharge.       Treatment completed by: Andree Torres 
Speech Language Pathology  [facility] PROGRESSIVE CARE    Dysphagia Treatment Note    Date: 2/23/2025  Patient’s Name: Lanny Jaimes  MRN: [id number]  Diagnosis:   Patient Active Problem List   Diagnosis Code    Focal hemorrhagic contusion of cerebrum (HCC) S06.33AA    Acute intracranial hemorrhage (HCC) I62.9    Cerebral amyloid angiopathy (HCC) E85.4, I68.0    Acquired hypothyroidism E03.9    Primary hypertension I10    Aneurysm of cavernous portion of left internal carotid artery I67.1    Hypertensive urgency I16.0    Encephalopathy G93.40    Major neurocognitive disorder (HCC) F03.90    Frequent falls R29.6    Multifocal pneumonia J18.9    COVID-19 U07.1    Bandemia D72.825    CRP elevated R79.82    Oral phase dysphagia R13.11       Pain Rating: None reported    Dysphagia Treatment  Treatment time:    SLP Individual Minutes  Time In: 1408  Time Out: 1424  Minutes: 16     Subjective: [x] Alert [x] Cooperative     [] Confused     [] Agitated    [] Lethargic    Current Diet:  Solids: Easy to Chew (IDDSI Level 7)  Liquids: Mildly Thick (Nectar) (IDDSI Level 2)    Objective/Assessment:    Pt. Seen for O/M treatment program.  Pt. Completed O/M exercises targeting pharyngeal/BOT strength X 10 reps X 4 sets with mod-max verbal/visual cues/clinician model. Pt. Unable to complete arabella maneuver or effortful swallow despite max cues/several repetitions of task instructions. Pt. Demo limited sustained attention d/t internal and external distractions. ST modified environment to decrease external distractions.    Pt. Observed with mildly thick liquids via cup. No overt s/s of aspiration/penetration noted. Written exercises provided and left at bedside with ST encouraging pt. To complete t/o the day. Pt. Verbalized understanding, however suspect poor carryover d/t impaired cognition.     Plan:  [x] Continue ST services    [] Discharge from ST:      Discharge recommendations:  [x] Further therapy recommended at discharge.   [] No therapy 
Speech Language Pathology  [facility] ICU    Date: 2/18/2025  Patient Name: Lanny Jaimes  YOB: 1955   AGE: 69 y.o.  MRN: [id number]        Patient Not Available for Speech Therapy     Due to:  [] Testing  [] Hemodialysis  [] Cancelled by RN  [] Surgery   [] Intubation/Sedation/Pain Medication  [] Medical instability  [] Refusal  [x] Other:    Order received for MBS and scheduled for 1430, however, pt. RN reports that pt. Unable to be transported c heated high flow O2 and requested that test not be completed today.    ST updated radiology.     ST to reattempt as pt. Appropriate tomorrow, 02/19.      Andree Torres M.A.CCC/SLP      
Spiritual Health History and Assessment/Progress Note  [facility]    (P) Spiritual/Emotional Needs, Attempted Encounter,  ,  ,      Name: Lanny Jaimes MRN: [id number]    Age: 69 y.o.     Sex: female   Language: English   Caodaism: Sikhism   Multifocal pneumonia     Date: 2/20/2025            Total Time Calculated: (P) 2 min              Spiritual Assessment continued in [facility] ICU        Referral/Consult From: (P) Rounding   Encounter Overview/Reason: (P) Spiritual/Emotional Needs, Attempted Encounter  Service Provided For: (P) Patient    Per directive, Pt. Unavailable for visit due to COVID and Droplet policy    Peg, Belief, Meaning:   Patient unable to assess at this time  Family/Friends No family/friends present      Importance and Influence:  Patient unable to assess at this time  Family/Friends No family/friends present    Community:  Patient Other: Unable to assess  Family/Friends No family/friends present    Assessment and Plan of Care:     Patient Interventions include: Other: Dropped off Prayer Card to show that someone cared enough to visit  Family/Friends Interventions include: No family/friends present    Patient Plan of Care: Spiritual Care available upon further referral  Family/Friends Plan of Care: No family/friends present    Electronically signed by Chaplain Anali on 2/20/2025 at 1:11 PM       02/20/25 1308   Encounter Summary   Encounter Overview/Reason Spiritual/Emotional Needs;Attempted Encounter   Service Provided For Patient   Referral/Consult From [facility]   Support System Unknown   Last Encounter  02/20/25   Complexity of Encounter Low   Begin Time 1305   End Time  1307   Total Time Calculated 2 min   Spiritual/Emotional needs   Type Spiritual Support   Assessment/Intervention/Outcome   Assessment Unable to assess;Other (Comment)  (Pt. Unavailable)   Intervention Other (Comment)  (Dropped off Prayer Card)   Outcome Other (comment)  (Pt. unavailable)       
[facility]   OCCUPATIONAL THERAPY MISSED TREATMENT NOTE   INPATIENT   Date: 25  Patient Name: Lanny Jaimes       Room:   MRN: [id number]   Account #: [id number]    : 1955  (69 y.o.)  Gender: female   Referring Practitioner: Consuelo Mooney APRN - CNP  Diagnosis: multifocal pneumonia             REASON FOR MISSED TREATMENT:  25    -    Refusal by Patient - pt repeatedly stating \"My needs are met\" when this writer and PTA introduced self, encouraged participation in therapy. Pt states \"it's not my thing.\" This writer attempted to edu pt on importance of working with therapy and getting out of bed however pt refuses. OT will continue to follow and check pt status tomorrow.    [id number]         Electronically signed by DUSTIN Pierce on 25 at 10:47 AM EST    
Writer attempted to give patient a sip of water prior to administering PO medications per diet order. Patient began coughing after sip of water. SpO2 88% to 91%.  
Body Weight: 73.7 kg (162 lb 7.7 oz), 120.4 % IBW. Weight Source: Bed scale  Current BMI (kg/m2): 25.4  Usual Body Weight: 68 kg (150 lb) (from RD notes ~1 month ago)     % Weight Change (Calculated): 8.3  Weight Adjustment For: No Adjustment                 BMI Categories: Overweight (BMI 25.0-29.9)    Estimated Daily Nutrient Needs:  Energy Requirements Based On: Formula  Weight Used for Energy Requirements: Admission  Energy (kcal/day): 0007-9962 kcal/day (MSJ x AF 1.1-1.2, IF 1.1)  Weight Used for Protein Requirements: Admission  Protein (g/day):  g/day  Method Used for Fluid Requirements: ml/Kg  Fluid (ml/day): 3423-7360 ml/day (25-30 ml/kg)    Nutrition Diagnosis:   Inadequate oral intake related to   as evidenced by intake 26-50%    Nutrition Interventions:   Food and/or Nutrient Delivery: Start Oral Nutrition Supplement  Nutrition Education/Counseling: No recommendation at this time  Coordination of Nutrition Care: Continue to monitor while inpatient  Plan of Care discussed with: Nurse    Goals:  Goals: PO intake 75% or greater, prior to discharge  Type of Goal: New goal  Previous Goal Met: New Goal    Nutrition Monitoring and Evaluation:   Behavioral-Environmental Outcomes: None Identified  Food/Nutrient Intake Outcomes: Food and Nutrient Intake, Supplement Intake  Physical Signs/Symptoms Outcomes: Biochemical Data, Chewing or Swallowing, GI Status, Nutrition Focused Physical Findings, Weight    Discharge Planning:    Continue current diet     Radha Montero RD  Contact: [phone]    
D-dimer elevated at 8.52.  CT chest showed no evidence of pulmonary arterial embolism.  Moderate atherosclerosis of the   thoracic aorta without aneurysm or dissection.  Multifocal opacities in the right lower lobe more than the middle lobe and upper lobe.  Features suggest multifocal pneumonia. There is a compression fracture deformity of the upper endplate of T11 with sclerosis in the upper portion of the T11 body.  Correlate with history/symptoms for subacute compression fracture.  WBC 12.4, CRP 54.1, procalcitonin 0.17.  EKG shows normal sinus rhythm.  Troponin high-sensitivity 28 x 2 readings.  Currently she is on a salter at 5 L a minute with SpO2 in the mid 90s.  ED provider consulted pulmonologist.     Patient being admitted to PCU for multifocal pneumonia and COVID-19 infection.  Patient with significant penicillin allergy was started on Azactam and vancomycin.  Continue upon admission.  Patient also received one-time dose Decadron IV 6 mg.  Will continue p.o. dosing daily.  Blood cultures pending x 2.  T11 deformity noted on CT thought to be chronic in nature as patient does not demonstrate signs of pain on palpation to the area.  Unable to know for sure due to patient's significant dementia.  Consider orthopedic surgery consult.  PT and OT eval and treat orders initiated      Past Medical History:     Past Medical History:   Diagnosis Date    Thyroid disease         Past Surgical History:     No past surgical history on file.     Medications Prior to Admission:     Prior to Admission medications    Medication Sig Start Date End Date Taking? Authorizing Provider   aspirin 81 MG EC tablet Take 1 tablet by mouth daily 1/28/25  Yes Brien Root MD   ondansetron (ZOFRAN-ODT) 4 MG disintegrating tablet Take 1 tablet by mouth every 8 hours as needed for Nausea or Vomiting 1/28/25  Yes Brien Root MD   atorvastatin (LIPITOR) 80 MG tablet Take 1 tablet by mouth nightly 1/28/25  Yes Brien Root MD 
To: Patient  Education Provided: Plan of Care, Role of Therapy, Transfer Training  Education Method: Verbal, Demonstration  Barriers to Learning: Cognition  Education Outcome: Continued education needed    Goals  Short Term Goals  Time Frame for Short Term Goals: upon dc  Short Term Goal 1: Pt will complete UB ADLs IND and LB ADLs with supervision, Good safety, and use of AE/modified techs as needed  Short Term Goal 2: Pt will complete bed mobility with TICO and good safety for improved skin integrity and hygiene  Short Term Goal 3: OT to assess functional mobility/transfers as appropriate (inform OTR to update goal)  Short Term Goal 4: Pt will follow 100% of one step commands during therapeutic exercise/functional activity for improved participation with self-care  Short Term Goal 5: Pt will tolerate dynamic/static sitting for 8+ mins with spO2 >90% during therapeutic exercise/functional activity for improved activity tolerance  Short Term Goal 6: Pt will actively participate in 15+ mins of therapeutic exercise/functional activity for improved participation and safety with self-care  Occupational Therapy Plan  Times Per Week: 4-6  Current Treatment Recommendations: Strengthening, Balance training, Functional mobility training, Endurance training, Safety education & training, Patient/Caregiver education & training, Equipment evaluation, education, & procurement, Positioning, Self-Care / ADL    Assessment  Activity Tolerance  Activity Tolerance: Treatment limited secondary to decreased cognition, Treatment limited secondary to medical complications (free text), Patient limited by fatigue (O2 needs)  Assessment  Performance deficits / Impairments: Decreased ADL status, Decreased strength, Decreased safe awareness, Decreased cognition, Decreased endurance, Decreased balance, Decreased posture  Treatment Diagnosis: Impaired self-care status  Prognosis: Good  Decision Making: Medium Complexity  Discharge Recommendations: 
  Net 1808.74 ml       General Appearance: alert, well appearing, and in no acute distress  Mental status: oriented to person, place, and time  Neck: supple, no carotid bruits, thyroid not palpable  Lungs: Bilateral equal air entry, coarse breath sounds, crackles at the bases  Cardiovascular: normal rate, regular rhythm, no murmur, gallop, rub  Abdomen: Soft, nontender, nondistended, normal bowel sounds, no hepatomegaly or splenomegaly  Neurologic: There are no new focal motor or sensory deficits, normal muscle tone and bulk, no abnormal sensation, normal speech, cranial nerves II through XII grossly intact  Skin: No gross lesions, rashes, bruising or bleeding on exposed skin area  Extremities: peripheral pulses palpable, no pedal edema or calf pain with palpation    Investigations:      Laboratory Testing:  Recent Results (from the past 24 hour(s))   Arterial Blood Gases    Collection Time: 02/17/25  4:28 PM   Result Value Ref Range    pH, Arterial 7.394 7.350 - 7.450    pCO2, Arterial 47.5 (H) 35.0 - 45.0 mmHg    pO2, Arterial 79.5 (L) 80.0 - 100.0 mmHg    HCO3, Arterial 28.4 (H) 22.0 - 26.0 mmol/L    Positive Base Excess, Art 2.8 (H) 0.0 - 2.0 mmol/L    O2 Sat, Arterial 94.8 (L) 95 - 98 %    Carboxyhemoglobin 1.1 0 - 5 %    Methemoglobin 0.3 0.0 - 1.9 %    Pt Temp 37.0     O2 Device/Flow/% O2 Mask     Respiratory Rate 20     Abdelrahman Test PASS     Sample Site Right Radial Artery     Pt. Position SEMI-FOWLERS    Basic Metabolic Panel w/ Reflex to MG    Collection Time: 02/18/25  4:33 AM   Result Value Ref Range    Sodium 148 (H) 136 - 145 mmol/L    Potassium 3.3 (L) 3.7 - 5.3 mmol/L    Chloride 112 (H) 98 - 107 mmol/L    CO2 26 20 - 31 mmol/L    Anion Gap 10 9 - 16 mmol/L    Glucose 132 (H) 74 - 99 mg/dL    BUN 21 8 - 23 mg/dL    Creatinine 0.6 (L) 0.7 - 1.2 mg/dL    Est, Glom Filt Rate >90 >60 mL/min/1.73m2    Calcium 8.6 8.6 - 10.4 mg/dL   CBC with auto differential    Collection Time: 02/18/25  4:33 AM   Result 
Activity Raw Score: 16  AM-PAC Inpatient ADL T-Scale Score : 35.96  ADL Inpatient CMS 0-100% Score: 53.32  ADL Inpatient CMS G-Code Modifier : CK    OT Minutes  OT Individual Minutes  Time In: 1118  Time Out: 1142  Minutes: 24      Electronically signed by JEREMIAH Baxter on 2/21/25 at 2:58 PM EST   
Daily, Consuelo Mooney APRN - CNP, 125 mcg at 02/27/25 0543    lisinopril (PRINIVIL;ZESTRIL) tablet 40 mg, 40 mg, Oral, Daily, Consuelo Mooney APRN - CNP, 40 mg at 02/27/25 0715    miconazole (MICOTIN) 2 % powder, , Topical, BID, Consuelo Mooney APRN - CNP, Given at 02/27/25 0718    polyethylene glycol (GLYCOLAX) packet 17 g, 17 g, Oral, Daily, Consuelo Mooney APRN - CNP, 17 g at 02/27/25 0716    Lab Results:     Lab Results   Component Value Date    WBC 19.6 (H) 02/26/2025    HGB 11.4 (L) 02/26/2025    HCT 35.4 (L) 02/26/2025    MCV 91.4 02/26/2025     02/26/2025     Lab Results   Component Value Date    CALCIUM 8.5 (L) 02/26/2025     02/26/2025    K 3.9 02/26/2025    CO2 27 02/26/2025     02/26/2025    BUN 13 02/26/2025    CREATININE 0.5 (L) 02/26/2025       Lab Results   Component Value Date    INR 1.0 01/07/2025    PROTIME 10.3 01/07/2025       Radiology:     Cxr 2/25 vs 2/20 vs 2/17: majority of opacity is resolved on right with mild infiltrate on RLL      ASSESSMENT:       COVID 19 infection-likely contracted from skilled nursing facility  Acute hypoxic respiratory failure secondary to multifocal pneumonia; resolved on room air  Witnessed aspiration evening of 2/17 that she was eating dinner very likely aspirated in the right lower lobe based on CT imaging and again during a swallow eval 2/20/25  1 of 2 blood cultures positive for staph epidermidis likely contaminant  MRSA nasal swab positive  Elevated D-dimer; CT of chest negative for pulmonary embolism  Hypertensive angiopathy with microhemorrhages (patient's presentation at [facility] showed a systolic blood pressure over 220)  History Intraparenchymal hemorrhage (left parietal lobe) no surgical intervention  Dementia appears to be severe>>knows sons name baseline, at baseline would normally know her own birthdate and age, does not consistently know her location, was living at home independently up until December, had a fall in January 
cough, congestion, and hypoxemia who was transferred from the [facility] for poor appetite and generalized malaise.  She was also noted to be hypoxic when EMS transferred her with oxygen saturation in the 80s.  A nonrebreather was applied by EMS.  She is normally not on oxygen and she was 5 L when she was initially admitted.  This was decreased to 4 L with oxygen saturation around 93%.  She was able to take her pills and liquid without any issues.     She did not eat breakfast or lunch because she apparently was not hungry.  However at dinnertime she had some chicken tenders and was noted by the student nurse to aspirate.  The patient states that this has happened to her before.  Subsequently, her saturation dropped to 75% on 4 L and she was quickly increased in terms of her oxygen up to 15 L nonrebreather.  An ABG showed a pH of 7.39 with a pCO2 of 47, and a pO2 of 79.  She has since been titrated down to approximately 12 L.  She reports that during the day she has become more short of breath.     The patient has cerebral amyloid angiopathy with significant dementia and frequent falls.  She has also hypertensive angiopathy with microhemorrhages.  She had an intraparenchymal hemorrhage (parietal lobe).  She was initially at [facility] in the emergency room on January 7 and then transferred to [place] after her fall.  She was seen by trauma and neurosurgery who recommended no surgical intervention.  Neuroendovascular also recommended monitoring her blood pressure keeping her systolic blood pressure greater than 140.  She was then eventually transferred to acute rehab.  She was admitted on January 17 and discharged January 30.  She was sent to the [facility].  Exam: social hx as able, ROM, MMT as able, balance, bed mobility, pt education  Clinical Presentation: alert, pleasant  Barriers to Learning: cognition  Discharge Recommendations: Patient would benefit from continued therapy after discharge  Activity 
jaundiced or pale.      Findings: No erythema.   Neurological:      Mental Status: She is alert and oriented to person, place, and time.      Cranial Nerves: No cranial nerve deficit.   Psychiatric:         Behavior: Behavior normal.         Past Medical History:     Past Medical History:   Diagnosis Date    Thyroid disease        Past Surgical  History:   No past surgical history on file.    Medications:      ferrous sulfate  325 mg Oral BID WC    carvedilol  12.5 mg Oral BID WC    linezolid  600 mg IntraVENous Q12H    cefepime  2,000 mg IntraVENous Q8H    sodium chloride flush  5-40 mL IntraVENous 2 times per day    enoxaparin  40 mg SubCUTAneous Daily    amLODIPine  10 mg Oral Daily    aspirin  81 mg Oral Daily    atorvastatin  80 mg Oral Nightly    donepezil  10 mg Oral Nightly    levothyroxine  125 mcg Oral Daily    lisinopril  40 mg Oral Daily    miconazole   Topical BID    polyethylene glycol  17 g Oral Daily       Social History:     Social History     Socioeconomic History    Marital status: Single     Spouse name: Not on file    Number of children: Not on file    Years of education: Not on file    Highest education level: Not on file   Occupational History    Not on file   Tobacco Use    Smoking status: Never    Smokeless tobacco: Not on file   Substance and Sexual Activity    Alcohol use: Not Currently    Drug use: Never    Sexual activity: Not on file   Other Topics Concern    Not on file   Social History Narrative    Not on file     Social Determinants of Health     Financial Resource Strain: Not on file   Food Insecurity: No Food Insecurity (1/17/2025)    Hunger Vital Sign     Worried About Running Out of Food in the Last Year: Never true     Ran Out of Food in the Last Year: Never true   Transportation Needs: No Transportation Needs (1/17/2025)    PRAPARE - Transportation     Lack of Transportation (Medical): No     Lack of Transportation (Non-Medical): No   Physical Activity: Not on file   Stress: 
place, Bed alarm in place, Call light within reach, Patient at risk for falls, Left in bed, Nurse notified, Heels elevated for pressure relief  Restraints  Restraints Initially in Place: No          02/25/25 1100   PT Individual Minutes   Time In 1005   Time Out 1028   Minutes 23     Co-treatment with OT warranted secondary to decreased safety and independence requiring 2 skilled therapy professionals to address individual discipline's goals. PT addressing postural control in sitting.       Electronically signed by Noelle Flowers PTA on 2/25/25 at 11:39 AM EST         
(inform OTR to update goal)  Short Term Goal 4: Pt will follow 100% of one step commands during therapeutic exercise/functional activity for improved participation with self-care  Short Term Goal 5: Pt will tolerate dynamic/static sitting for 8+ mins with spO2 >90% during therapeutic exercise/functional activity for improved activity tolerance  Short Term Goal 6: Pt will actively participate in 15+ mins of therapeutic exercise/functional activity for improved participation and safety with self-care    Plan  Occupational Therapy Plan  Times Per Week: 4-6  Current Treatment Recommendations: Strengthening, Balance training, Functional mobility training, Endurance training, Safety education & training, Patient/Caregiver education & training, Equipment evaluation, education, & procurement, Positioning, Self-Care / ADL    OT Individual Minutes  OT Individual Minutes  Time In: 1342  Time Out: 1400  Minutes: 18  Time Code Minutes   Timed Code Treatment Minutes: 10 Minutes    Electronically signed by DUSTIN Gray on 2/18/25 at 3:14 PM EST       
Year: No     Number of Times Moved in the Last Year: 0     Homeless in the Last Year: No       Family History:   No family history on file.   Medical Decision Making:   I have independently reviewed/ordered the following labs:    CBC with Differential:   Recent Labs     02/22/25  0806   WBC 14.3*   HGB 11.5*   HCT 35.0*      LYMPHOPCT 16*   MONOPCT 12*   EOSPCT 1     BMP:  Recent Labs     02/21/25  0542 02/22/25  0806    140   K 3.4* 3.9    105   CO2 27 25   BUN 14 16   CREATININE 0.5* 0.5*     Hepatic Function Panel:   Recent Labs     02/22/25  0500 02/23/25  0545   BILIDIR 0.2 <0.1   IBILI 0.1 Can not be calculated   BILITOT 0.3 <0.2   ALKPHOS 73 69   ALT 11 10   AST 18 16     No results for input(s): \"RPR\" in the last 72 hours.  No results for input(s): \"HIV\" in the last 72 hours.  No results for input(s): \"BC\" in the last 72 hours.  Lab Results   Component Value Date/Time    CREATININE 0.5 02/22/2025 08:06 AM    GLUCOSE 103 02/22/2025 08:06 AM       Detailed results:        Thank you for allowing us to participate in the care of this patient.Please call with questions.    This note is created with the assistance of a speech recognition program.  While intending to generate adocument that actually reflects the content of the visit, the document can still have some errors including those of syntax and sound a like substitutions which may escape proof reading.  It such instances, actual meaningcan be extrapolated by contextual diversion.    SEBAS NORIEGA, APRN - CNP  Office: [phone]  Perfect serve / office [phone]        
clinical setting. Borderline size of the heart and central vasculature and could true mild pulmonary vascular congestion.       Assessment :      Hospital Problems             Last Modified POA    * (Principal) Multifocal pneumonia 2/17/2025 Yes    Cerebral amyloid angiopathy (HCC) 2/17/2025 Yes    Acquired hypothyroidism 2/17/2025 Yes    Primary hypertension 2/17/2025 Yes    Frequent falls 2/17/2025 Yes    COVID-19 2/18/2025 Yes    Bandemia 2/19/2025 Yes    CRP elevated 2/19/2025 Yes    Oral phase dysphagia 2/20/2025 Yes       Plan:     Patient status inpatient in the Progressive Unit/Step down    1.  69-year-old female with a recent intracranial hemorrhage in the setting of frequent falls admitted with multifocal pneumonia possible aspiration pneumonia, broad-spectrum antibiotics aztreonam and vancomycin added, infectious disease consulted at this time, cultures are pending, will get speech on board, breathing treatments,  Acute respiratory failure due to above,  Acute metabolic encephalopathy secondary to intracranial hemorrhage and recent cerebral insult,  Recent incidental left cavernous ICA aneurysm status post IR DSA on January 9, 2025,  Possible cerebral amyloid angiopathy versus hypertensive angiopathy,  # Poorly controlled hypertension, continue home medications,  Hypothyroidism, continue levothyroxine,  COVID-positive, multifocal pneumonia at this time treated with dexamethasone and above antibiotics, infectious disease on board,  DVT prophylaxis with Lovenox,  Full CODE STATUS,  Overall prognosis poor  2. Disposition 3d    2/18  Patient is seen and examined at bedside.  Transferred from PCU yesterday as she was requiring increasing amounts of oxygen, currently on 30 L 70%.  COVID-19 positive.  Speech therapy evaluated patient, recommend MBSS.  Hypokalemia, replace as per protocol.  Daily BMP.  Magnesium normal.  Leukocytosis secondary to underlying infection and currently receiving steroids continue to 
file   Social Connections: Not on file   Intimate Partner Violence: Not on file   Housing Stability: Low Risk  (1/17/2025)    Housing Stability Vital Sign     Unable to Pay for Housing in the Last Year: No     Number of Times Moved in the Last Year: 0     Homeless in the Last Year: No       Family History:   No family history on file.   Medical Decision Making:   I have independently reviewed/ordered the following labs:    CBC with Differential:   Recent Labs     02/20/25  0320 02/22/25  0806   WBC 14.8* 14.3*   HGB 10.5* 11.5*   HCT 33.6* 35.0*    370   LYMPHOPCT 11* 16*   MONOPCT 8* 12*   EOSPCT 0 1     BMP:  Recent Labs     02/20/25  0320 02/21/25  0542 02/22/25  0806    139 140   K 3.3* 3.4* 3.9    103 105   CO2 29 27 25   BUN 17 14 16   CREATININE 0.5* 0.5* 0.5*   MG 2.1  --   --      Hepatic Function Panel:   Recent Labs     02/21/25  0542 02/22/25  0500   BILIDIR 0.1 0.2   IBILI 0.1 0.1   BILITOT 0.2 0.3   ALKPHOS 71 73   ALT 10 11   AST 15 18     No results for input(s): \"RPR\" in the last 72 hours.  No results for input(s): \"HIV\" in the last 72 hours.  No results for input(s): \"BC\" in the last 72 hours.  Lab Results   Component Value Date/Time    CREATININE 0.5 02/22/2025 08:06 AM    GLUCOSE 103 02/22/2025 08:06 AM       Detailed results:        Thank you for allowing us to participate in the care of this patient.Please call with questions.    This note is created with the assistance of a speech recognition program.  While intending to generate adocument that actually reflects the content of the visit, the document can still have some errors including those of syntax and sound a like substitutions which may escape proof reading.  It such instances, actual meaningcan be extrapolated by contextual diversion.    iSl Lloyd, MARLENE - CNP  Office: [phone]  Perfect serve / office [phone]        
BMP.  Magnesium normal.  Leukocytosis secondary to underlying infection and currently receiving steroids continue to monitor with daily CBC.  Glucose levels acceptable limits.  CRP elevated.  D-dimer was elevated likely due to underlying inflammation going on., CTPA negative for any PE.  Blood culture 1 out of 2 growing gram-positive cocci in clusters.  Repeat blood cultures.  ID on board.  MRSA pending.  DNR CCA, no intubation.  Prognosis guarded.    2/19.  Patient seen and examined at bedside.  Continues to be on heated high flow nasal cannula. Wean off oxygen as tolerated  Repeat blood cultures are negative.  Leucocytosis due to underlying infection, daily CBC  Patient currently on IV cefepime as per ID.  Started on remdesivir.  Dysphagia diet.  MBSS when patient more stable.  MRSA positive.  Continue to monitor in ICU.  DNR CCA, no intubation.  Prognosis guarded.    2/20  Patient seen and examined at bedside.   Afebrile, blood pressure on the higher side.  Will increase dose of Coreg.  Has come down to 4 L high flow nasal cannula, saturating at 100%.  Okay for progressive when okay with pulmonology.  Intermediate ICU status  Continues to be on cefepime and Zyvox.  Changed to remdesivir as per ID.  CRP downtrending.    2/21  Patient seen and examined.  Transferred from ICU yesterday.  Remdesivir till 2/24.  Zyvox cefepime till 2/25  ID on board.  CRP downtrending  Down to 1 L.  Likely SNF.  Wean off oxygen as tolerated.  Hypokalemia, repeat BMP pending  Repeat CBC pending.  Dysphagia diet.  Midline on discharge?  Referrals given  Case management on board  Continue speech therapy    2/22  Patient seen and examined.  On remdesivir as per ID.  Cefepime  Zyvox.  Hypokalemia  Repeat a BMP.  1 L nasal cannula.  Pending pre-CERT.  Easy to chew diet.  Repeat labs are pending.  Liver function WNL.  Transfer to [facility].    2/23  Patient seen and comment.  On 1 L nasal cannula.  Afebrile, vital signs stable.  Persistent 
TO PULMONOLOGY  IP CONSULT TO INFECTIOUS DISEASES  IP CONSULT TO PHARMACY     Patient is admitted as inpatient status because of co-morbidities listed above, severity of signs and symptoms as outlined, requirement for current medical therapies and most importantly because of direct risk to patient if care not provided in a hospital setting.  Expected length of stay > 48 hours.    Janeen Richard MD  2/19/2025  8:42 AM    Copy sent to Juan Antonio Monte MD    Please note that this chart was generated using voice recognition Dragon dictation software.  Although every effort was made to ensure the accuracy of this automated transcription, some errors in transcription may have occurred.    
recognition Dragon dictation software.  Although every effort was made to ensure the accuracy of this automated transcription, some errors in transcription may have occurred.

## 2025-02-27 NOTE — CARE COORDINATION
DISCHARGE PLANNING NOTE:    Writer received call from pt son Sha regarding guardianship forms that were in pt chart on PCU unit, bedside RN Filipe stated he would get this form filled out for pt.     Writer located this form in chart, not completed.    Perfectserve message placed to Dr. Argueta requesting if he would complete this form.     Writer explained to Sha that pt is discharging at 1230, physician may not complete. Sha understanding if this form is unable to be filled out but expresses frustration that it was not completed when he informed the nursing staff.    Electronically signed by LUIS ENRIQUE Perez on 2/27/2025 at 12:20 PM

## 2025-02-27 NOTE — PLAN OF CARE
Problem: Discharge Planning  Goal: Discharge to home or other facility with appropriate resources  2/18/2025 0438 by Patito Guevara RN  Outcome: Progressing  Flowsheets (Taken 2/17/2025 2000)  Discharge to home or other facility with appropriate resources:   Identify barriers to discharge with patient and caregiver   Arrange for needed discharge resources and transportation as appropriate   Identify discharge learning needs (meds, wound care, etc)   Arrange for interpreters to assist at discharge as needed     Problem: Safety - Adult  Goal: Free from fall injury  2/18/2025 0438 by Patito Guevara RN  Outcome: Progressing     Problem: Confusion  Goal: Confusion, delirium, dementia, or psychosis is improved or at baseline  Description: INTERVENTIONS:  1. Assess for possible contributors to thought disturbance, including medications, impaired vision or hearing, underlying metabolic abnormalities, dehydration, psychiatric diagnoses, and notify attending LIP  2. [place] high risk fall precautions, as indicated  3. Provide frequent short contacts to provide reality reorientation, refocusing and direction  4. Decrease environmental stimuli, including noise as appropriate  5. Monitor and intervene to maintain adequate nutrition, hydration, elimination, sleep and activity  6. If unable to ensure safety without constant attention obtain sitter and review sitter guidelines with assigned personnel  7. Initiate Psychosocial CNS and Spiritual Care consult, as indicated  2/18/2025 0438 by Patito Guevara RN  Outcome: Progressing  Flowsheets (Taken 2/17/2025 2000)  Effect of thought disturbance (confusion, delirium, dementia, or psychosis) are managed with adequate functional status:   Assess for contributors to thought disturbance, including medications, impaired vision or hearing, underlying metabolic abnormalities, dehydration, psychiatric diagnoses, notify LIP   [place] high risk fall precautions, as indicated   Provide 
  Problem: Discharge Planning  Goal: Discharge to home or other facility with appropriate resources  2/18/2025 1706 by Jennifer Vazquez, RN  Outcome: Progressing     Problem: Safety - Adult  Goal: Free from fall injury  2/18/2025 1706 by Jennifer Vazquez, RN  Outcome: Progressing     Problem: Confusion  Goal: Confusion, delirium, dementia, or psychosis is improved or at baseline  Description: INTERVENTIONS:  1. Assess for possible contributors to thought disturbance, including medications, impaired vision or hearing, underlying metabolic abnormalities, dehydration, psychiatric diagnoses, and notify attending LIP  2. [place] high risk fall precautions, as indicated  3. Provide frequent short contacts to provide reality reorientation, refocusing and direction  4. Decrease environmental stimuli, including noise as appropriate  5. Monitor and intervene to maintain adequate nutrition, hydration, elimination, sleep and activity  6. If unable to ensure safety without constant attention obtain sitter and review sitter guidelines with assigned personnel  7. Initiate Psychosocial CNS and Spiritual Care consult, as indicated  2/18/2025 1706 by Jennifer Vazquez, RN  Outcome: Progressing       Problem: Respiratory - Adult  Goal: Achieves optimal ventilation and oxygenation  Outcome: Progressing  Flowsheets (Taken 2/18/2025 1706)  Achieves optimal ventilation and oxygenation:   Assess for changes in respiratory status   Assess for changes in mentation and behavior   Position to facilitate oxygenation and minimize respiratory effort   Oxygen supplementation based on oxygen saturation or arterial blood gases   Assess and instruct to report shortness of breath or any respiratory difficulty   Respiratory therapy support as indicated     Problem: Skin/Tissue Integrity  Goal: Skin integrity remains intact  Description: 1.  Monitor for areas of redness and/or skin breakdown  2.  Assess vascular access sites 
  Problem: Discharge Planning  Goal: Discharge to home or other facility with appropriate resources  2/19/2025 1549 by Fatou Andrews, RN  Outcome: Progressing  Flowsheets (Taken 2/19/2025 0800)  Discharge to home or other facility with appropriate resources:   Identify barriers to discharge with patient and caregiver   Arrange for needed discharge resources and transportation as appropriate   Identify discharge learning needs (meds, wound care, etc)   Arrange for interpreters to assist at discharge as needed   Refer to discharge planning if patient needs post-hospital services based on physician order or complex needs related to functional status, cognitive ability or social support system     Problem: Safety - Adult  Goal: Free from fall injury  2/19/2025 1549 by Fatou Andrews, RN  Outcome: Progressing  Flowsheets (Taken 2/19/2025 0800)  Free From Fall Injury: Instruct family/caregiver on patient safety     Problem: Confusion  Goal: Confusion, delirium, dementia, or psychosis is improved or at baseline  Description: INTERVENTIONS:  1. Assess for possible contributors to thought disturbance, including medications, impaired vision or hearing, underlying metabolic abnormalities, dehydration, psychiatric diagnoses, and notify attending LIP  2. [place] high risk fall precautions, as indicated  3. Provide frequent short contacts to provide reality reorientation, refocusing and direction  4. Decrease environmental stimuli, including noise as appropriate  5. Monitor and intervene to maintain adequate nutrition, hydration, elimination, sleep and activity  6. If unable to ensure safety without constant attention obtain sitter and review sitter guidelines with assigned personnel  7. Initiate Psychosocial CNS and Spiritual Care consult, as indicated  2/19/2025 1549 by Fatou Andrews, RN  Outcome: Progressing  Flowsheets (Taken 2/19/2025 0800)  Effect of thought disturbance (confusion, delirium, dementia, or 
  Problem: Discharge Planning  Goal: Discharge to home or other facility with appropriate resources  2/21/2025 1649 by Temitope Gramajo RN  Outcome: Progressing   Pt to discharge to LTAC once medically cleared.   Problem: Safety - Adult  Goal: Free from fall injury  2/21/2025 1649 by Temitope Gramajo RN  Outcome: Progressing  Flowsheets (Taken 2/21/2025 1041)  Free From Fall Injury: Instruct family/caregiver on patient safety   No injury noted this shift.   Problem: Confusion  Goal: Confusion, delirium, dementia, or psychosis is improved or at baseline  Description: INTERVENTIONS:  1. Assess for possible contributors to thought disturbance, including medications, impaired vision or hearing, underlying metabolic abnormalities, dehydration, psychiatric diagnoses, and notify attending LIP  2. [place] high risk fall precautions, as indicated  3. Provide frequent short contacts to provide reality reorientation, refocusing and direction  4. Decrease environmental stimuli, including noise as appropriate  5. Monitor and intervene to maintain adequate nutrition, hydration, elimination, sleep and activity  6. If unable to ensure safety without constant attention obtain sitter and review sitter guidelines with assigned personnel  7. Initiate Psychosocial CNS and Spiritual Care consult, as indicated  Outcome: Progressing   Monitoring.   Problem: Respiratory - Adult  Goal: Achieves optimal ventilation and oxygenation  2/21/2025 1649 by Temitope Gramajo RN  Outcome: Progressing   Monitoring.   Problem: Neurosensory - Adult  Goal: Achieves stable or improved neurological status  Outcome: Progressing   Monitoring.   Problem: Genitourinary - Adult  Goal: Absence of urinary retention  2/21/2025 1649 by Temitope Gramajo, RN  Outcome: Progressing   Pt noting adequate output this shift.   Problem: Skin/Tissue Integrity - Adult  Goal: Oral mucous membranes remain intact  Outcome: Progressing  Flowsheets (Taken 2/21/2025 1041)  Oral 
  Problem: Discharge Planning  Goal: Discharge to home or other facility with appropriate resources  2/22/2025 0012 by Baljeet Benavidez RN  Outcome: Progressing  2/21/2025 1649 by Temitope Gramajo RN  Outcome: Progressing     Problem: Safety - Adult  Goal: Free from fall injury  2/22/2025 0012 by Baljeet Benavidez RN  Outcome: Progressing  2/21/2025 1649 by Temitope Gramajo RN  Outcome: Progressing  Flowsheets (Taken 2/21/2025 1041)  Free From Fall Injury: Instruct family/caregiver on patient safety     Problem: Confusion  Goal: Confusion, delirium, dementia, or psychosis is improved or at baseline  Description: INTERVENTIONS:  1. Assess for possible contributors to thought disturbance, including medications, impaired vision or hearing, underlying metabolic abnormalities, dehydration, psychiatric diagnoses, and notify attending LIP  2. [place] high risk fall precautions, as indicated  3. Provide frequent short contacts to provide reality reorientation, refocusing and direction  4. Decrease environmental stimuli, including noise as appropriate  5. Monitor and intervene to maintain adequate nutrition, hydration, elimination, sleep and activity  6. If unable to ensure safety without constant attention obtain sitter and review sitter guidelines with assigned personnel  7. Initiate Psychosocial CNS and Spiritual Care consult, as indicated  2/22/2025 0012 by Baljeet Benavidez RN  Outcome: Progressing  2/21/2025 1649 by Temitope Gramajo RN  Outcome: Progressing     Problem: Respiratory - Adult  Goal: Achieves optimal ventilation and oxygenation  2/22/2025 0012 by Baljeet Benavidez RN  Outcome: Progressing  2/21/2025 1649 by Temitope Gramajo RN  Outcome: Progressing     
  Problem: Discharge Planning  Goal: Discharge to home or other facility with appropriate resources  2/23/2025 0355 by Karen Cline RN  Outcome: Progressing     Problem: Safety - Adult  Goal: Free from fall injury  2/23/2025 0355 by Karen Cline RN  Outcome: Progressing     Problem: Confusion  Goal: Confusion, delirium, dementia, or psychosis is improved or at baseline  Description: INTERVENTIONS:  1. Assess for possible contributors to thought disturbance, including medications, impaired vision or hearing, underlying metabolic abnormalities, dehydration, psychiatric diagnoses, and notify attending LIP  2. [place] high risk fall precautions, as indicated  3. Provide frequent short contacts to provide reality reorientation, refocusing and direction  4. Decrease environmental stimuli, including noise as appropriate  5. Monitor and intervene to maintain adequate nutrition, hydration, elimination, sleep and activity  6. If unable to ensure safety without constant attention obtain sitter and review sitter guidelines with assigned personnel  7. Initiate Psychosocial CNS and Spiritual Care consult, as indicated  2/23/2025 0355 by Karen Cline RN  Outcome: Progressing     Problem: Respiratory - Adult  Goal: Achieves optimal ventilation and oxygenation  2/23/2025 0355 by Karen Cline RN  Outcome: Progressing     Problem: Neurosensory - Adult  Goal: Achieves stable or improved neurological status  2/23/2025 0355 by Karen Cline RN  Outcome: Progressing     Problem: Genitourinary - Adult  Goal: Absence of urinary retention  Outcome: Progressing     Problem: Metabolic/Fluid and Electrolytes - Adult  Goal: Electrolytes maintained within normal limits  Outcome: Progressing     Problem: Skin/Tissue Integrity - Adult  Goal: Skin integrity remains intact  Description: 1.  Monitor for areas of redness and/or skin breakdown  2.  Assess vascular access sites hourly  3.  Every 4-6 hours minimum:  Change oxygen saturation probe 
  Problem: Discharge Planning  Goal: Discharge to home or other facility with appropriate resources  2/23/2025 1456 by Margie Lovelace RN  Outcome: Progressing     Problem: Safety - Adult  Goal: Free from fall injury  2/23/2025 1456 by Margie Lovelace RN  Outcome: Progressing     Problem: Confusion  Goal: Confusion, delirium, dementia, or psychosis is improved or at baseline  Description: INTERVENTIONS:  1. Assess for possible contributors to thought disturbance, including medications, impaired vision or hearing, underlying metabolic abnormalities, dehydration, psychiatric diagnoses, and notify attending LIP  2. [place] high risk fall precautions, as indicated  3. Provide frequent short contacts to provide reality reorientation, refocusing and direction  4. Decrease environmental stimuli, including noise as appropriate  5. Monitor and intervene to maintain adequate nutrition, hydration, elimination, sleep and activity  6. If unable to ensure safety without constant attention obtain sitter and review sitter guidelines with assigned personnel  7. Initiate Psychosocial CNS and Spiritual Care consult, as indicated  2/23/2025 1456 by Margie Lovelace RN  Outcome: Progressing     Problem: Respiratory - Adult  Goal: Achieves optimal ventilation and oxygenation  2/23/2025 1456 by Margie Lovelace, RN  Outcome: Progressing     
  Problem: Discharge Planning  Goal: Discharge to home or other facility with appropriate resources  2/25/2025 0447 by Ayaka Moncada RN  Outcome: Progressing     Problem: Safety - Adult  Goal: Free from fall injury  2/25/2025 0447 by Ayaka Moncada RN  Outcome: Progressing     Problem: Respiratory - Adult  Goal: Achieves optimal ventilation and oxygenation  2/25/2025 0447 by Ayaka Moncada RN  Outcome: Progressing     Problem: Genitourinary - Adult  Goal: Absence of urinary retention  2/25/2025 0447 by Ayaka Moncada RN  Outcome: Progressing     Problem: Skin/Tissue Integrity - Adult  Goal: Skin integrity remains intact  Description: 1.  Monitor for areas of redness and/or skin breakdown  2.  Assess vascular access sites hourly  3.  Every 4-6 hours minimum:  Change oxygen saturation probe site  4.  Every 4-6 hours:  If on nasal continuous positive airway pressure, respiratory therapy assess nares and determine need for appliance change or resting period  2/25/2025 0447 by Ayaka Moncada RN  Outcome: Progressing     Problem: Skin/Tissue Integrity  Goal: Skin integrity remains intact  Description: 1.  Monitor for areas of redness and/or skin breakdown  2.  Assess vascular access sites hourly  3.  Every 4-6 hours minimum:  Change oxygen saturation probe site  4.  Every 4-6 hours:  If on nasal continuous positive airway pressure, respiratory therapy assess nares and determine need for appliance change or resting period  2/25/2025 0447 by Ayaka Moncada RN  Outcome: Progressing     Problem: ABCDS Injury Assessment  Goal: Absence of physical injury  2/25/2025 0447 by Ayaka Moncada RN  Outcome: Progressing     Problem: Pain  Goal: Verbalizes/displays adequate comfort level or baseline comfort level  2/25/2025 0447 by Ayaka Moncada RN  Outcome: Progressing     
  Problem: Discharge Planning  Goal: Discharge to home or other facility with appropriate resources  2/25/2025 1420 by Margie Lovelace RN  Outcome: Progressing     Problem: Safety - Adult  Goal: Free from fall injury  2/25/2025 1420 by Margie Lovelace RN  Outcome: Progressing     Problem: Confusion  Goal: Confusion, delirium, dementia, or psychosis is improved or at baseline  Description: INTERVENTIONS:  1. Assess for possible contributors to thought disturbance, including medications, impaired vision or hearing, underlying metabolic abnormalities, dehydration, psychiatric diagnoses, and notify attending LIP  2. [place] high risk fall precautions, as indicated  3. Provide frequent short contacts to provide reality reorientation, refocusing and direction  4. Decrease environmental stimuli, including noise as appropriate  5. Monitor and intervene to maintain adequate nutrition, hydration, elimination, sleep and activity  6. If unable to ensure safety without constant attention obtain sitter and review sitter guidelines with assigned personnel  7. Initiate Psychosocial CNS and Spiritual Care consult, as indicated  Outcome: Progressing     Problem: Respiratory - Adult  Goal: Achieves optimal ventilation and oxygenation  2/25/2025 1420 by Margie Lovelace RN  Outcome: Progressing     
  Problem: Discharge Planning  Goal: Discharge to home or other facility with appropriate resources  2/26/2025 0128 by Ayaka Moncada RN  Outcome: Progressing     Problem: Safety - Adult  Goal: Free from fall injury  2/26/2025 0128 by Ayaka Moncada RN  Outcome: Progressing     Problem: Respiratory - Adult  Goal: Achieves optimal ventilation and oxygenation  2/26/2025 0128 by Ayaka Moncada RN  Outcome: Progressing     Problem: Genitourinary - Adult  Goal: Absence of urinary retention  Outcome: Progressing     Problem: Skin/Tissue Integrity - Adult  Goal: Skin integrity remains intact  Description: 1.  Monitor for areas of redness and/or skin breakdown  2.  Assess vascular access sites hourly  3.  Every 4-6 hours minimum:  Change oxygen saturation probe site  4.  Every 4-6 hours:  If on nasal continuous positive airway pressure, respiratory therapy assess nares and determine need for appliance change or resting period  Outcome: Progressing     Problem: Skin/Tissue Integrity  Goal: Skin integrity remains intact  Description: 1.  Monitor for areas of redness and/or skin breakdown  2.  Assess vascular access sites hourly  3.  Every 4-6 hours minimum:  Change oxygen saturation probe site  4.  Every 4-6 hours:  If on nasal continuous positive airway pressure, respiratory therapy assess nares and determine need for appliance change or resting period  Outcome: Progressing     Problem: Chronic Conditions and Co-morbidities  Goal: Patient's chronic conditions and co-morbidity symptoms are monitored and maintained or improved  Outcome: Progressing     Problem: ABCDS Injury Assessment  Goal: Absence of physical injury  Outcome: Progressing     Problem: Pain  Goal: Verbalizes/displays adequate comfort level or baseline comfort level  Outcome: Progressing     
  Problem: Discharge Planning  Goal: Discharge to home or other facility with appropriate resources  Outcome: Progressing     Problem: Safety - Adult  Goal: Free from fall injury  Outcome: Progressing     Problem: Confusion  Goal: Confusion, delirium, dementia, or psychosis is improved or at baseline  Description: INTERVENTIONS:  1. Assess for possible contributors to thought disturbance, including medications, impaired vision or hearing, underlying metabolic abnormalities, dehydration, psychiatric diagnoses, and notify attending LIP  2. [place] high risk fall precautions, as indicated  3. Provide frequent short contacts to provide reality reorientation, refocusing and direction  4. Decrease environmental stimuli, including noise as appropriate  5. Monitor and intervene to maintain adequate nutrition, hydration, elimination, sleep and activity  6. If unable to ensure safety without constant attention obtain sitter and review sitter guidelines with assigned personnel  7. Initiate Psychosocial CNS and Spiritual Care consult, as indicated  Outcome: Progressing     Problem: Respiratory - Adult  Goal: Achieves optimal ventilation and oxygenation  Outcome: Progressing     Problem: Neurosensory - Adult  Goal: Achieves stable or improved neurological status  Outcome: Progressing     
  Problem: Discharge Planning  Goal: Discharge to home or other facility with appropriate resources  Outcome: Progressing     Problem: Safety - Adult  Goal: Free from fall injury  Outcome: Progressing     Problem: Confusion  Goal: Confusion, delirium, dementia, or psychosis is improved or at baseline  Description: INTERVENTIONS:  1. Assess for possible contributors to thought disturbance, including medications, impaired vision or hearing, underlying metabolic abnormalities, dehydration, psychiatric diagnoses, and notify attending LIP  2. [place] high risk fall precautions, as indicated  3. Provide frequent short contacts to provide reality reorientation, refocusing and direction  4. Decrease environmental stimuli, including noise as appropriate  5. Monitor and intervene to maintain adequate nutrition, hydration, elimination, sleep and activity  6. If unable to ensure safety without constant attention obtain sitter and review sitter guidelines with assigned personnel  7. Initiate Psychosocial CNS and Spiritual Care consult, as indicated  Outcome: Progressing     Problem: Respiratory - Adult  Goal: Achieves optimal ventilation and oxygenation  Outcome: Progressing     Problem: Skin/Tissue Integrity - Adult  Goal: Skin integrity remains intact  Description: 1.  Monitor for areas of redness and/or skin breakdown  2.  Assess vascular access sites hourly  3.  Every 4-6 hours minimum:  Change oxygen saturation probe site  4.  Every 4-6 hours:  If on nasal continuous positive airway pressure, respiratory therapy assess nares and determine need for appliance change or resting period  Outcome: Progressing     Problem: Skin/Tissue Integrity - Adult  Goal: Incisions, wounds, or drain sites healing without S/S of infection  Outcome: Progressing     Problem: Skin/Tissue Integrity  Goal: Skin integrity remains intact  Description: 1.  Monitor for areas of redness and/or skin breakdown  2.  Assess vascular access sites hourly  3. 
  Problem: Discharge Planning  Goal: Discharge to home or other facility with appropriate resources  Outcome: Progressing     Problem: Safety - Adult  Goal: Free from fall injury  Outcome: Progressing     Problem: Confusion  Goal: Confusion, delirium, dementia, or psychosis is improved or at baseline  Outcome: Progressing     
  Problem: Discharge Planning  Goal: Discharge to home or other facility with appropriate resources  Outcome: Progressing     Problem: Safety - Adult  Goal: Free from fall injury  Outcome: Progressing     Problem: Respiratory - Adult  Goal: Achieves optimal ventilation and oxygenation  Outcome: Progressing     Problem: Genitourinary - Adult  Goal: Absence of urinary retention  Outcome: Progressing     Problem: Skin/Tissue Integrity - Adult  Goal: Skin integrity remains intact  Description: 1.  Monitor for areas of redness and/or skin breakdown  2.  Assess vascular access sites hourly  3.  Every 4-6 hours minimum:  Change oxygen saturation probe site  4.  Every 4-6 hours:  If on nasal continuous positive airway pressure, respiratory therapy assess nares and determine need for appliance change or resting period  Outcome: Progressing     Problem: Skin/Tissue Integrity  Goal: Skin integrity remains intact  Description: 1.  Monitor for areas of redness and/or skin breakdown  2.  Assess vascular access sites hourly  3.  Every 4-6 hours minimum:  Change oxygen saturation probe site  4.  Every 4-6 hours:  If on nasal continuous positive airway pressure, respiratory therapy assess nares and determine need for appliance change or resting period  Outcome: Progressing     Problem: ABCDS Injury Assessment  Goal: Absence of physical injury  Outcome: Progressing     
  Problem: Discharge Planning  Goal: Discharge to home or other facility with appropriate resources  Outcome: Progressing  Flowsheets (Taken 2/27/2025 0326)  Discharge to home or other facility with appropriate resources: Identify barriers to discharge with patient and caregiver     Problem: Safety - Adult  Goal: Free from fall injury  Outcome: Progressing  Flowsheets (Taken 2/27/2025 0326)  Free From Fall Injury: Instruct family/caregiver on patient safety     Problem: Confusion  Goal: Confusion, delirium, dementia, or psychosis is improved or at baseline  Description: INTERVENTIONS:  1. Assess for possible contributors to thought disturbance, including medications, impaired vision or hearing, underlying metabolic abnormalities, dehydration, psychiatric diagnoses, and notify attending LIP  2. [place] high risk fall precautions, as indicated  3. Provide frequent short contacts to provide reality reorientation, refocusing and direction  4. Decrease environmental stimuli, including noise as appropriate  5. Monitor and intervene to maintain adequate nutrition, hydration, elimination, sleep and activity  6. If unable to ensure safety without constant attention obtain sitter and review sitter guidelines with assigned personnel  7. Initiate Psychosocial CNS and Spiritual Care consult, as indicated  Outcome: Progressing  Flowsheets (Taken 2/27/2025 0326)  Effect of thought disturbance (confusion, delirium, dementia, or psychosis) are managed with adequate functional status: Assess for contributors to thought disturbance, including medications, impaired vision or hearing, underlying metabolic abnormalities, dehydration, psychiatric diagnoses, notify LIP     Problem: Respiratory - Adult  Goal: Achieves optimal ventilation and oxygenation  Outcome: Progressing  Flowsheets (Taken 2/27/2025 0326)  Achieves optimal ventilation and oxygenation:   Assess for changes in mentation and behavior   Assess for changes in respiratory 
elimination, sleep and activity  6. If unable to ensure safety without constant attention obtain sitter and review sitter guidelines with assigned personnel  7. Initiate Psychosocial CNS and Spiritual Care consult, as indicated  2/20/2025 0441 by Karina Nielsen RN  Outcome: Progressing  Flowsheets  Taken 2/20/2025 0400  Effect of thought disturbance (confusion, delirium, dementia, or psychosis) are managed with adequate functional status:   Assess for contributors to thought disturbance, including medications, impaired vision or hearing, underlying metabolic abnormalities, dehydration, psychiatric diagnoses, [facility] high [facility] precautions, as indicated   Provide frequent short contacts to provide reality reorientation, refocusing and direction   Decrease environmental stimuli, including noise as appropriate  Taken 2/20/2025 0000  Effect of thought disturbance (confusion, delirium, dementia, or psychosis) are managed with adequate functional status:   Assess for contributors to thought disturbance, including medications, impaired vision or hearing, underlying metabolic abnormalities, dehydration, psychiatric diagnoses, [facility] high risk fall precautions, as indicated   Provide frequent short contacts to provide reality reorientation, refocusing and direction   Decrease environmental stimuli, including noise as appropriate  Taken 2/19/2025 2000  Effect of thought disturbance (confusion, delirium, dementia, or psychosis) are managed with adequate functional status:   Assess for contributors to thought disturbance, including medications, impaired vision or hearing, underlying metabolic abnormalities, dehydration, psychiatric diagnoses, [facility] high risk fall precautions, as indicated   Provide frequent short contacts to provide reality reorientation, refocusing and direction   Decrease environmental stimuli, including noise as appropriate     Problem: Respiratory - 
or improved  2/19/2025 0332 by Patito Guevara RN  Outcome: Progressing     Problem: Neurosensory - Adult  Goal: Achieves stable or improved neurological status  2/19/2025 0332 by Patito Guevara RN  Outcome: Progressing  Flowsheets (Taken 2/18/2025 2000)  Achieves stable or improved neurological status:   Assess for and report changes in neurological status   Monitor temperature, glucose, and sodium. Initiate appropriate interventions as ordered     Problem: Genitourinary - Adult  Goal: Absence of urinary retention  2/19/2025 0332 by Patito Guevara RN  Outcome: Progressing     Problem: Metabolic/Fluid and Electrolytes - Adult  Goal: Electrolytes maintained within normal limits  2/19/2025 0332 by Patito Guevara RN  Outcome: Progressing

## 2025-03-04 ENCOUNTER — OUTSIDE SERVICES (OUTPATIENT)
Dept: INFECTIOUS DISEASES | Age: 70
End: 2025-03-04
Payer: MEDICARE

## 2025-03-04 DIAGNOSIS — R79.82 ELEVATED C-REACTIVE PROTEIN (CRP): ICD-10-CM

## 2025-03-04 DIAGNOSIS — D72.829 LEUKOCYTOSIS, UNSPECIFIED TYPE: Primary | ICD-10-CM

## 2025-03-04 DIAGNOSIS — Z88.0 ALLERGY TO PENICILLIN: ICD-10-CM

## 2025-03-04 PROCEDURE — 99305 1ST NF CARE MODERATE MDM 35: CPT | Performed by: NURSE PRACTITIONER

## 2025-03-06 VITALS
DIASTOLIC BLOOD PRESSURE: 77 MMHG | TEMPERATURE: 97.1 F | RESPIRATION RATE: 18 BRPM | OXYGEN SATURATION: 91 % | HEART RATE: 68 BPM | SYSTOLIC BLOOD PRESSURE: 132 MMHG

## 2025-03-07 NOTE — PROGRESS NOTES
[facility] -   Infectious diseases evaluation  admission date 2/27/2025    reason for consultation:   Antibiotic Management    Impression :   Current:  COVID-19 with possible superimposed bacterial infection.  Completed course of Remdesivir.  Multifocal pneumonia/witnessed aspiration on 2/17/2025  Leukocytosis  Elevated CRP  Penicillin allergy with anaphylaxis/the patient tolerated cephalosporins in the past     Other:    Discussion / summary of stay / plan of care/ Recommendations:   Completed 7 day course of Zyvox and Cefepime on 2/27/25.  HENCE:   Monitor off antibiotics.  Supportive care.  Discussed with patient, RN.    Infection Control Recommendations   [place] Precautions    Antimicrobial Stewardship Recommendations   Simplification of therapy  Targeted therapy    History of Present Illness:   Initial history:  Lanny Jaimes is a 69 y.o.-year-old female who presented to [place]  on 2/17/25 from [facility] for worsening cough associated with nasal congestion, generalized weakness and decreased appetite for 3 days.  The patient was hypoxic, was placed initially on 4 L of oxygen per nasal cannula.  The patient had witnessed aspiration while eating lunch on 2/17/2025, oxygen requirement increased and was placed on high flow oxygen.  She is currently on high flow oxygen per nasal cannula, complaining of cough and shortness of breath, denied nausea or vomiting, no fever or chills, no other complaints.  COVID-19 test was positive.  Initial WBC 12.4, C-reactive protein 54.1, procalcitonin 0.17  1 of 2 blood culture grew staph epi  MRSA swab was positive  CT chest showed no PE, did show multifocal infiltrate more on the right side  The patient was recently hospitalized at [facility] after a fall January 7, 2025, had intraparenchymal hemorrhage was transferred to [facility].    Discharged to [facility] on 2/27/25.       Interval changes 3/4/25  Awake

## 2025-03-25 ENCOUNTER — OFFICE VISIT (OUTPATIENT)
Age: 70
End: 2025-03-25
Payer: MEDICARE

## 2025-03-25 VITALS
WEIGHT: 158 LBS | HEART RATE: 62 BPM | BODY MASS INDEX: 24.8 KG/M2 | DIASTOLIC BLOOD PRESSURE: 64 MMHG | HEIGHT: 67 IN | SYSTOLIC BLOOD PRESSURE: 104 MMHG

## 2025-03-25 DIAGNOSIS — S06.33AA FOCAL HEMORRHAGIC CONTUSION OF CEREBRUM (HCC): Primary | ICD-10-CM

## 2025-03-25 DIAGNOSIS — R90.89 ABNORMAL BRAIN MRI: ICD-10-CM

## 2025-03-25 PROCEDURE — 3078F DIAST BP <80 MM HG: CPT | Performed by: NEUROLOGICAL SURGERY

## 2025-03-25 PROCEDURE — 3074F SYST BP LT 130 MM HG: CPT | Performed by: NEUROLOGICAL SURGERY

## 2025-03-25 PROCEDURE — 99214 OFFICE O/P EST MOD 30 MIN: CPT | Performed by: NEUROLOGICAL SURGERY

## 2025-03-25 PROCEDURE — 1123F ACP DISCUSS/DSCN MKR DOCD: CPT | Performed by: NEUROLOGICAL SURGERY

## 2025-03-25 PROCEDURE — 1159F MED LIST DOCD IN RCRD: CPT | Performed by: NEUROLOGICAL SURGERY

## 2025-04-02 NOTE — PROGRESS NOTES
[facility], [facility], [facility]  [address], [address]  [facility] [place] [address]  Dept: [phone]  Dept Fax: [phone]     Patient:  Lanny Jaimes  YOB: 1955  Date: 3/25/25      Chief Complaint   Patient presents with    Follow-Up from Hospital     6 week hospital follow up (parietal lobe contusion), d/c 1/30/25            HPI:     History of Present Illness  The patient is a 69-year-old woman who presents for follow-up due to a minor area of bleeding observed on head imaging in early January 2025.    No current issues with falls are reported, and she maintains her autonomy in decision-making. However, intermittent headaches are experienced, predominantly localized across her forehead. She has been residing in a facility for an unspecified duration. It was reported that she had a fall on the previous Friday, resulting in a sore on her right knee. Knee pain is also reported, which she attributes to factors other than her recent fall. Additionally, occasional itching in her legs is mentioned. Physical therapy and occupational therapy have been received, with today marking the conclusion of her occupational therapy sessions.  A staff member from the facility where she is staying was with her and was suggesting that the patient's memory is limited.             History:     Past Medical History:   Diagnosis Date    Thyroid disease      No past surgical history on file.  No family history on file.  Current Outpatient Medications on File Prior to Visit   Medication Sig Dispense Refill    carvedilol (COREG) 12.5 MG tablet Take 1 tablet by mouth 2 times daily (with meals) 60 tablet 3    ferrous sulfate (IRON 325) 325 (65 Fe) MG tablet Take 1 tablet by mouth 2 times daily (with meals) 30 tablet 3    aspirin 81 MG EC tablet Take 1 tablet by mouth daily      ondansetron (ZOFRAN-ODT) 4 MG disintegrating tablet Take 1 tablet by

## 2025-04-24 ENCOUNTER — HOSPITAL ENCOUNTER (OUTPATIENT)
Dept: MRI IMAGING | Age: 70
Discharge: HOME | End: 2025-04-26
Attending: NEUROLOGICAL SURGERY

## 2025-04-24 DIAGNOSIS — R90.89 ABNORMAL BRAIN MRI: ICD-10-CM

## 2025-04-24 DIAGNOSIS — S06.33AA FOCAL HEMORRHAGIC CONTUSION OF CEREBRUM (HCC): ICD-10-CM

## 2025-06-12 ENCOUNTER — OFFICE VISIT (OUTPATIENT)
Dept: NEUROLOGY | Age: 70
End: 2025-06-12
Payer: COMMERCIAL

## 2025-06-12 VITALS
SYSTOLIC BLOOD PRESSURE: 93 MMHG | HEIGHT: 67 IN | HEART RATE: 65 BPM | BODY MASS INDEX: 24.74 KG/M2 | DIASTOLIC BLOOD PRESSURE: 73 MMHG

## 2025-06-12 DIAGNOSIS — I67.1 ANEURYSM OF CAVERNOUS PORTION OF LEFT INTERNAL CAROTID ARTERY: Primary | ICD-10-CM

## 2025-06-12 PROCEDURE — 1159F MED LIST DOCD IN RCRD: CPT | Performed by: PSYCHIATRY & NEUROLOGY

## 2025-06-12 PROCEDURE — 3017F COLORECTAL CA SCREEN DOC REV: CPT | Performed by: PSYCHIATRY & NEUROLOGY

## 2025-06-12 PROCEDURE — 99215 OFFICE O/P EST HI 40 MIN: CPT | Performed by: PSYCHIATRY & NEUROLOGY

## 2025-06-12 PROCEDURE — 1090F PRES/ABSN URINE INCON ASSESS: CPT | Performed by: PSYCHIATRY & NEUROLOGY

## 2025-06-12 PROCEDURE — G8427 DOCREV CUR MEDS BY ELIG CLIN: HCPCS | Performed by: PSYCHIATRY & NEUROLOGY

## 2025-06-12 PROCEDURE — 3078F DIAST BP <80 MM HG: CPT | Performed by: PSYCHIATRY & NEUROLOGY

## 2025-06-12 PROCEDURE — G8400 PT W/DXA NO RESULTS DOC: HCPCS | Performed by: PSYCHIATRY & NEUROLOGY

## 2025-06-12 PROCEDURE — G8420 CALC BMI NORM PARAMETERS: HCPCS | Performed by: PSYCHIATRY & NEUROLOGY

## 2025-06-12 PROCEDURE — 4004F PT TOBACCO SCREEN RCVD TLK: CPT | Performed by: PSYCHIATRY & NEUROLOGY

## 2025-06-12 PROCEDURE — 1123F ACP DISCUSS/DSCN MKR DOCD: CPT | Performed by: PSYCHIATRY & NEUROLOGY

## 2025-06-12 PROCEDURE — 3074F SYST BP LT 130 MM HG: CPT | Performed by: PSYCHIATRY & NEUROLOGY

## 2025-06-12 RX ORDER — ACETAMINOPHEN 325 MG/1
650 TABLET ORAL EVERY 4 HOURS PRN
COMMUNITY

## 2025-06-12 RX ORDER — BISACODYL 10 MG
10 SUPPOSITORY, RECTAL RECTAL DAILY PRN
COMMUNITY

## 2025-06-12 RX ORDER — POLYETHYLENE GLYCOL 3350 17 G/17G
17 POWDER, FOR SOLUTION ORAL DAILY
COMMUNITY

## 2025-06-12 RX ORDER — SENNOSIDES 8.6 MG/1
2 TABLET ORAL DAILY PRN
COMMUNITY

## 2025-06-12 RX ORDER — DOCUSATE SODIUM 100 MG/1
100 CAPSULE, LIQUID FILLED ORAL DAILY
COMMUNITY

## 2025-06-12 ASSESSMENT — ENCOUNTER SYMPTOMS
SHORTNESS OF BREATH: 0
COLOR CHANGE: 0
NAUSEA: 0
VOICE CHANGE: 0
COUGH: 0
PHOTOPHOBIA: 0
VOMITING: 0

## 2025-06-12 NOTE — PROGRESS NOTES
(PRINIVIL;ZESTRIL) 40 MG tablet Take 1 tablet by mouth daily      amLODIPine (NORVASC) 10 MG tablet Take 1 tablet by mouth daily      melatonin 3 MG TABS tablet Take 2 tablets by mouth at bedtime      donepezil (ARICEPT) 10 MG tablet Take 1 tablet by mouth nightly      levothyroxine (SYNTHROID) 125 MCG tablet Take 1 tablet by mouth Daily (Patient taking differently: Take 1 tablet by mouth Daily 137 mcg daily)      miconazole (MICOTIN) 2 % powder Apply topically 2 times daily. (Patient not taking: Reported on 6/12/2025)       No current facility-administered medications for this visit.     Past Medical History:   Diagnosis Date    Thyroid disease       No past surgical history on file.  No family history on file.  Social History     Tobacco Use    Smoking status: Never    Smokeless tobacco: Never   Substance Use Topics    Alcohol use: Not Currently        Subjective:     Review of Systems   Constitutional:  Negative for fever and unexpected weight change.   HENT:  Negative for voice change.    Eyes:  Negative for photophobia and visual disturbance.   Respiratory:  Negative for cough and shortness of breath.    Cardiovascular:  Negative for chest pain and palpitations.   Gastrointestinal:  Negative for nausea and vomiting.   Musculoskeletal:  Negative for neck stiffness.   Skin:  Negative for color change and pallor.   Neurological:  Negative for weakness and headaches.   Psychiatric/Behavioral:  Negative for confusion and decreased concentration.          Objective:     BP 93/73 (BP Site: Left Upper Arm, Patient Position: Sitting, BP Cuff Size: Small Adult)   Pulse 65   Ht 1.702 m (5' 7.01\")   BMI 24.74 kg/m²   Physical Exam  Gen: Sitting in wheelchair, nad  CV:RRR  NEURO: alert and oriented x 3 intact language attention and knowledge  CN: eomi, perrl, vff, v1-v3 intact no facial asymmetry, midline tongue  Motor 5/5 bue/ble  COORD: no dysmetria  Sensory: intact lt      Results  1- dSA      Assessment:

## 2025-06-23 ENCOUNTER — HOSPITAL ENCOUNTER (OUTPATIENT)
Dept: MRI IMAGING | Age: 70
Discharge: HOME | End: 2025-06-25
Attending: NEUROLOGICAL SURGERY
Payer: COMMERCIAL

## 2025-06-23 PROCEDURE — 2500000003 HC RX 250 WO HCPCS: Performed by: NEUROLOGICAL SURGERY

## 2025-06-23 PROCEDURE — 6360000004 HC RX CONTRAST MEDICATION: Performed by: NEUROLOGICAL SURGERY

## 2025-06-23 PROCEDURE — A9579 GAD-BASE MR CONTRAST NOS,1ML: HCPCS | Performed by: NEUROLOGICAL SURGERY

## 2025-06-23 PROCEDURE — 70553 MRI BRAIN STEM W/O & W/DYE: CPT

## 2025-06-23 RX ORDER — SODIUM CHLORIDE 0.9 % (FLUSH) 0.9 %
10 SYRINGE (ML) INJECTION PRN
Status: DISCONTINUED | OUTPATIENT
Start: 2025-06-23 | End: 2025-06-26 | Stop reason: HOSPADM

## 2025-06-23 RX ORDER — GADOTERIDOL 279.3 MG/ML
15 INJECTION INTRAVENOUS
Status: COMPLETED | OUTPATIENT
Start: 2025-06-23 | End: 2025-06-23

## 2025-06-23 RX ORDER — GADOTERIDOL 279.3 MG/ML
20 INJECTION INTRAVENOUS
Status: DISCONTINUED | OUTPATIENT
Start: 2025-06-23 | End: 2025-06-23

## 2025-06-23 RX ADMIN — SODIUM CHLORIDE, PRESERVATIVE FREE 10 ML: 5 INJECTION INTRAVENOUS at 13:39

## 2025-06-23 RX ADMIN — GADOTERIDOL 15 ML: 279.3 INJECTION, SOLUTION INTRAVENOUS at 13:38

## 2025-07-17 ENCOUNTER — TELEPHONE (OUTPATIENT)
Dept: NEUROLOGY | Age: 70
End: 2025-07-17

## 2025-07-17 NOTE — TELEPHONE ENCOUNTER
Patients son called and wanted to reschedule patiens procedure with Dr. Giles on 7/23  Gave message to Roma as she is working with son and patients facility on paperwork.

## 2025-07-18 ENCOUNTER — TELEPHONE (OUTPATIENT)
Dept: NEUROLOGY | Age: 70
End: 2025-07-18

## 2025-07-18 NOTE — TELEPHONE ENCOUNTER
Lyly from [facility] called, family would like to cancel procedure at this time as patient does not have a POA or guardian and they will call to reschedule once in place.

## 2025-07-18 NOTE — TELEPHONE ENCOUNTER
Lyly called from [facility]. Patient does not have a POA, obtaining guardianship and will reschedule when done. Cancelled with Lucien.

## 2025-07-23 ENCOUNTER — HOSPITAL ENCOUNTER (OUTPATIENT)
Dept: INTERVENTIONAL RADIOLOGY/VASCULAR | Age: 70
Discharge: HOME | End: 2025-07-23

## 2025-08-19 ENCOUNTER — TRANSCRIBE ORDERS (OUTPATIENT)
Dept: ADMINISTRATIVE | Age: 70
End: 2025-08-19

## 2025-08-19 DIAGNOSIS — R13.12 OROPHARYNGEAL DYSPHAGIA: Primary | ICD-10-CM
